# Patient Record
Sex: MALE | Race: WHITE | NOT HISPANIC OR LATINO | URBAN - METROPOLITAN AREA
[De-identification: names, ages, dates, MRNs, and addresses within clinical notes are randomized per-mention and may not be internally consistent; named-entity substitution may affect disease eponyms.]

---

## 2017-09-13 ENCOUNTER — APPOINTMENT (RX ONLY)
Dept: URBAN - METROPOLITAN AREA CLINIC 24 | Facility: CLINIC | Age: 77
Setting detail: DERMATOLOGY
End: 2017-09-13

## 2017-09-13 DIAGNOSIS — L81.4 OTHER MELANIN HYPERPIGMENTATION: ICD-10-CM

## 2017-09-13 DIAGNOSIS — D485 NEOPLASM OF UNCERTAIN BEHAVIOR OF SKIN: ICD-10-CM

## 2017-09-13 DIAGNOSIS — L20.89 OTHER ATOPIC DERMATITIS: ICD-10-CM

## 2017-09-13 DIAGNOSIS — D22 MELANOCYTIC NEVI: ICD-10-CM

## 2017-09-13 DIAGNOSIS — L57.0 ACTINIC KERATOSIS: ICD-10-CM

## 2017-09-13 DIAGNOSIS — D18.0 HEMANGIOMA: ICD-10-CM

## 2017-09-13 PROBLEM — F32.9 MAJOR DEPRESSIVE DISORDER, SINGLE EPISODE, UNSPECIFIED: Status: ACTIVE | Noted: 2017-09-13

## 2017-09-13 PROBLEM — D48.5 NEOPLASM OF UNCERTAIN BEHAVIOR OF SKIN: Status: ACTIVE | Noted: 2017-09-13

## 2017-09-13 PROBLEM — L20.84 INTRINSIC (ALLERGIC) ECZEMA: Status: ACTIVE | Noted: 2017-09-13

## 2017-09-13 PROBLEM — D18.01 HEMANGIOMA OF SKIN AND SUBCUTANEOUS TISSUE: Status: ACTIVE | Noted: 2017-09-13

## 2017-09-13 PROBLEM — D22.5 MELANOCYTIC NEVI OF TRUNK: Status: ACTIVE | Noted: 2017-09-13

## 2017-09-13 PROCEDURE — 17000 DESTRUCT PREMALG LESION: CPT

## 2017-09-13 PROCEDURE — ? OTHER

## 2017-09-13 PROCEDURE — 17003 DESTRUCT PREMALG LES 2-14: CPT

## 2017-09-13 PROCEDURE — 99214 OFFICE O/P EST MOD 30 MIN: CPT | Mod: 25

## 2017-09-13 PROCEDURE — ? LIQUID NITROGEN

## 2017-09-13 PROCEDURE — ? COUNSELING

## 2017-09-13 ASSESSMENT — LOCATION SIMPLE DESCRIPTION DERM
LOCATION SIMPLE: SCALP
LOCATION SIMPLE: LEFT CHEEK
LOCATION SIMPLE: CHEST
LOCATION SIMPLE: ABDOMEN
LOCATION SIMPLE: RIGHT FOREARM
LOCATION SIMPLE: LEFT ANTERIOR NECK
LOCATION SIMPLE: RIGHT ANKLE
LOCATION SIMPLE: LEFT FOREARM
LOCATION SIMPLE: RIGHT UPPER BACK
LOCATION SIMPLE: UPPER BACK

## 2017-09-13 ASSESSMENT — LOCATION DETAILED DESCRIPTION DERM
LOCATION DETAILED: LEFT SUPERIOR MEDIAL MALAR CHEEK
LOCATION DETAILED: LEFT SUPERIOR PARIETAL SCALP
LOCATION DETAILED: RIGHT SUPERIOR MEDIAL UPPER BACK
LOCATION DETAILED: LEFT DISTAL DORSAL FOREARM
LOCATION DETAILED: LEFT CENTRAL MALAR CHEEK
LOCATION DETAILED: RIGHT DISTAL DORSAL FOREARM
LOCATION DETAILED: RIGHT ANTERIOR LATERAL MALLEOLUS
LOCATION DETAILED: LEFT CLAVICULAR NECK
LOCATION DETAILED: LEFT MEDIAL INFERIOR CHEST
LOCATION DETAILED: INFERIOR THORACIC SPINE
LOCATION DETAILED: PERIUMBILICAL SKIN

## 2017-09-13 ASSESSMENT — LOCATION ZONE DERM
LOCATION ZONE: SCALP
LOCATION ZONE: ARM
LOCATION ZONE: TRUNK
LOCATION ZONE: LEG
LOCATION ZONE: NECK
LOCATION ZONE: FACE

## 2017-09-13 NOTE — PROCEDURE: OTHER
Other (Free Text): If it doesn't heal within a month will remove
Note Text (......Xxx Chief Complaint.): This diagnosis correlates with the
Detail Level: Simple
Other (Free Text): Does not bother pt comes and goes

## 2017-09-13 NOTE — HPI: SKIN LESIONS
How Severe Is Your Skin Lesion?: mild
Have Your Skin Lesions Been Treated?: not been treated
Is This A New Presentation, Or A Follow-Up?: Skin Lesions
Additional History: Age spots

## 2017-11-20 ENCOUNTER — HOSPITAL ENCOUNTER (OUTPATIENT)
Dept: SURGERY | Age: 77
Discharge: HOME | End: 2017-11-20
Attending: ORTHOPAEDIC SURGERY

## 2017-11-20 VITALS
TEMPERATURE: 96 F | OXYGEN SATURATION: 97 % | WEIGHT: 175.4 LBS | SYSTOLIC BLOOD PRESSURE: 154 MMHG | HEART RATE: 61 BPM | HEIGHT: 68 IN | BODY MASS INDEX: 26.58 KG/M2 | DIASTOLIC BLOOD PRESSURE: 73 MMHG

## 2017-11-20 RX ORDER — HYDROCODONE BITARTRATE AND ACETAMINOPHEN 5; 325 MG/1; MG/1
.5-1 TABLET ORAL
COMMUNITY
End: 2018-01-12

## 2017-11-20 RX ORDER — DICLOFENAC SODIUM 10 MG/G
GEL TOPICAL
COMMUNITY
End: 2018-01-12

## 2017-11-20 NOTE — PERIOP NOTES
Patient verified name, , and surgery as listed in [facility]. Type 1B surgery, walk in assessment complete. Labs per surgeon: no orders received. Office called. Labs per anesthesia protocol: none needed. EKG: not needed per [facility] protocols. Called and left a voicemail with Asya Morales at Dr. Jen Wei office for pre-op orders and for instructions on how long patient needs to hold Plavix. Stress test dated 17 and [facility] cardiology office note dated 11/3/17 printed and on chart for reference if needed. Requesting other records from [facility] cardiology via fax. Patient has a cardiology appointment on 17. Will need obtain note. Hibiclens and instructions given per hospital policy. Patient provided with and instructed on educational handouts including Guide to Surgery, Pain Management, Hand Hygiene, Blood Transfusion Education, and [facility] Brochure. Patient answered medical/surgical history questions at their best of ability. All prior to admission medications documented in [facility]. Original medication prescription bottles visualized during patient appointment. Patient instructed to hold all vitamins 7 days prior to surgery and NSAIDS 5 days prior to surgery, patient verbalized understanding. Medications to be held: vitamins, voltaren, plavix. Patient instructed to continue previous medications as prescribed prior to surgery and to take the following medications the day of surgery according to anesthesia guidelines with a small sip of water: tylenol if needed, 81 mg aspirin, verapamil, zantac if needed, metoprolol, isosorbide, norco if needed. Instructed to bring c-pap dos. Patient teach back successful and patient demonstrates knowledge of instructions.

## 2017-11-20 NOTE — PERIOP NOTES
Telephone encounter noted in EMR from [facility] cardiology that states patient may hold Plavix for 5 days. Note on chart for reference.

## 2017-11-20 NOTE — PERIOP NOTES
Spoke with Colin Bertrand at Dr. Pugh [facility] office. States patient needs to hold Plavix for 5 days prior. Called and informed patient to hold Plavix for 5 days with last dose being on 11/25/17. Patient verbalized understanding. [place] [facility]ology called and message left for permission to hold Plavix for 5 days.

## 2017-11-30 ENCOUNTER — ANESTHESIA EVENT (OUTPATIENT)
Dept: SURGERY | Age: 77
End: 2017-11-30
Payer: MEDICARE

## 2017-11-30 NOTE — PERIOP NOTES
Pt with cardiology appointment 11/29/17 with [facility]- Dr. Lillian Alvarado. Per office note: \"already had a nuke which was low risk and he is on asa,bblocker,statin without angina. .he should do well\". Office note placed on chart for reference.

## 2017-12-01 ENCOUNTER — HOSPITAL ENCOUNTER (OUTPATIENT)
Age: 77
Setting detail: OUTPATIENT SURGERY
Discharge: HOME | End: 2017-12-01
Attending: ORTHOPAEDIC SURGERY | Admitting: ORTHOPAEDIC SURGERY
Payer: MEDICARE

## 2017-12-01 ENCOUNTER — ANESTHESIA (OUTPATIENT)
Dept: SURGERY | Age: 77
End: 2017-12-01
Payer: MEDICARE

## 2017-12-01 VITALS
HEART RATE: 64 BPM | BODY MASS INDEX: 26.52 KG/M2 | TEMPERATURE: 97.7 F | OXYGEN SATURATION: 92 % | DIASTOLIC BLOOD PRESSURE: 57 MMHG | RESPIRATION RATE: 15 BRPM | SYSTOLIC BLOOD PRESSURE: 113 MMHG | WEIGHT: 175 LBS | HEIGHT: 68 IN

## 2017-12-01 PROCEDURE — 74011250636 HC RX REV CODE- 250/636

## 2017-12-01 PROCEDURE — 74011000250 HC RX REV CODE- 250

## 2017-12-01 PROCEDURE — 77030018836 HC SOL IRR NACL ICUM -A: Performed by: ORTHOPAEDIC SURGERY

## 2017-12-01 PROCEDURE — 77030008703 HC TU ET UNCUF COVD -A: Performed by: ANESTHESIOLOGY

## 2017-12-01 PROCEDURE — 77030016258 HC SLNG ARM PATT -B: Performed by: ORTHOPAEDIC SURGERY

## 2017-12-01 PROCEDURE — 76210000021 HC REC RM PH II 0.5 TO 1 HR: Performed by: ORTHOPAEDIC SURGERY

## 2017-12-01 PROCEDURE — 77030011640 HC PAD GRND REM COVD -A: Performed by: ORTHOPAEDIC SURGERY

## 2017-12-01 PROCEDURE — 77030003602 HC NDL NRV BLK BBMI -B: Performed by: ANESTHESIOLOGY

## 2017-12-01 PROCEDURE — 77030002933 HC SUT MCRYL J&J -A: Performed by: ORTHOPAEDIC SURGERY

## 2017-12-01 PROCEDURE — 77030008477 HC STYL SATN SLP COVD -A: Performed by: ANESTHESIOLOGY

## 2017-12-01 PROCEDURE — 77030020782 HC GWN BAIR PAWS FLX 3M -B: Performed by: ANESTHESIOLOGY

## 2017-12-01 PROCEDURE — 74011250636 HC RX REV CODE- 250/636: Performed by: ANESTHESIOLOGY

## 2017-12-01 PROCEDURE — 77030011263 HC ELECTRD ACRPLSTY CNMD -B: Performed by: ORTHOPAEDIC SURGERY

## 2017-12-01 PROCEDURE — 76942 ECHO GUIDE FOR BIOPSY: CPT | Performed by: ORTHOPAEDIC SURGERY

## 2017-12-01 PROCEDURE — 76010010054 HC POST OP PAIN BLOCK: Performed by: ORTHOPAEDIC SURGERY

## 2017-12-01 PROCEDURE — 76210000006 HC OR PH I REC 0.5 TO 1 HR: Performed by: ORTHOPAEDIC SURGERY

## 2017-12-01 PROCEDURE — 77030003666 HC NDL SPINAL BD -A: Performed by: ORTHOPAEDIC SURGERY

## 2017-12-01 PROCEDURE — 76010000149 HC OR TIME 1 TO 1.5 HR: Performed by: ORTHOPAEDIC SURGERY

## 2017-12-01 PROCEDURE — 77030006891 HC BLD SHV RESECT STRY -B: Performed by: ORTHOPAEDIC SURGERY

## 2017-12-01 PROCEDURE — 76060000033 HC ANESTHESIA 1 TO 1.5 HR: Performed by: ORTHOPAEDIC SURGERY

## 2017-12-01 RX ORDER — ROCURONIUM BROMIDE 10 MG/ML
INJECTION, SOLUTION INTRAVENOUS AS NEEDED
Status: DISCONTINUED | OUTPATIENT
Start: 2017-12-01 | End: 2017-12-01 | Stop reason: HOSPADM

## 2017-12-01 RX ORDER — ROPIVACAINE HYDROCHLORIDE 10 MG/ML
INJECTION EPIDURAL; INFILTRATION; PERINEURAL AS NEEDED
Status: DISCONTINUED | OUTPATIENT
Start: 2017-12-01 | End: 2017-12-01 | Stop reason: HOSPADM

## 2017-12-01 RX ORDER — SODIUM CHLORIDE, SODIUM LACTATE, POTASSIUM CHLORIDE, CALCIUM CHLORIDE 600; 310; 30; 20 MG/100ML; MG/100ML; MG/100ML; MG/100ML
75 INJECTION, SOLUTION INTRAVENOUS CONTINUOUS
Status: DISCONTINUED | OUTPATIENT
Start: 2017-12-01 | End: 2017-12-01 | Stop reason: HOSPADM

## 2017-12-01 RX ORDER — HYDROCODONE BITARTRATE AND ACETAMINOPHEN 5; 325 MG/1; MG/1
2 TABLET ORAL AS NEEDED
Status: DISCONTINUED | OUTPATIENT
Start: 2017-12-01 | End: 2017-12-01 | Stop reason: HOSPADM

## 2017-12-01 RX ORDER — NEOSTIGMINE METHYLSULFATE 1 MG/ML
INJECTION INTRAVENOUS AS NEEDED
Status: DISCONTINUED | OUTPATIENT
Start: 2017-12-01 | End: 2017-12-01 | Stop reason: HOSPADM

## 2017-12-01 RX ORDER — LIDOCAINE HYDROCHLORIDE 10 MG/ML
0.1 INJECTION INFILTRATION; PERINEURAL AS NEEDED
Status: DISCONTINUED | OUTPATIENT
Start: 2017-12-01 | End: 2017-12-01 | Stop reason: HOSPADM

## 2017-12-01 RX ORDER — HYDROMORPHONE HYDROCHLORIDE 2 MG/ML
0.5 INJECTION, SOLUTION INTRAMUSCULAR; INTRAVENOUS; SUBCUTANEOUS
Status: DISCONTINUED | OUTPATIENT
Start: 2017-12-01 | End: 2017-12-01 | Stop reason: HOSPADM

## 2017-12-01 RX ORDER — HYDROCODONE BITARTRATE AND ACETAMINOPHEN 5; 325 MG/1; MG/1
.5-1 TABLET ORAL
Qty: 30 TAB | Refills: 0 | Status: SHIPPED | OUTPATIENT
Start: 2017-12-01 | End: 2018-08-29

## 2017-12-01 RX ORDER — MIDAZOLAM HYDROCHLORIDE 1 MG/ML
5 INJECTION, SOLUTION INTRAMUSCULAR; INTRAVENOUS ONCE
Status: DISCONTINUED | OUTPATIENT
Start: 2017-12-01 | End: 2017-12-01 | Stop reason: HOSPADM

## 2017-12-01 RX ORDER — FENTANYL CITRATE 50 UG/ML
100 INJECTION, SOLUTION INTRAMUSCULAR; INTRAVENOUS ONCE
Status: COMPLETED | OUTPATIENT
Start: 2017-12-01 | End: 2017-12-01

## 2017-12-01 RX ORDER — PROPOFOL 10 MG/ML
INJECTION, EMULSION INTRAVENOUS AS NEEDED
Status: DISCONTINUED | OUTPATIENT
Start: 2017-12-01 | End: 2017-12-01 | Stop reason: HOSPADM

## 2017-12-01 RX ORDER — MIDAZOLAM HYDROCHLORIDE 1 MG/ML
2 INJECTION, SOLUTION INTRAMUSCULAR; INTRAVENOUS
Status: DISCONTINUED | OUTPATIENT
Start: 2017-12-01 | End: 2017-12-01 | Stop reason: HOSPADM

## 2017-12-01 RX ORDER — OXYCODONE HYDROCHLORIDE 5 MG/1
5 TABLET ORAL
Status: DISCONTINUED | OUTPATIENT
Start: 2017-12-01 | End: 2017-12-01 | Stop reason: HOSPADM

## 2017-12-01 RX ORDER — LIDOCAINE HYDROCHLORIDE 20 MG/ML
INJECTION, SOLUTION EPIDURAL; INFILTRATION; INTRACAUDAL; PERINEURAL AS NEEDED
Status: DISCONTINUED | OUTPATIENT
Start: 2017-12-01 | End: 2017-12-01 | Stop reason: HOSPADM

## 2017-12-01 RX ORDER — ONDANSETRON 2 MG/ML
INJECTION INTRAMUSCULAR; INTRAVENOUS AS NEEDED
Status: DISCONTINUED | OUTPATIENT
Start: 2017-12-01 | End: 2017-12-01 | Stop reason: HOSPADM

## 2017-12-01 RX ORDER — GLYCOPYRROLATE 0.2 MG/ML
INJECTION INTRAMUSCULAR; INTRAVENOUS AS NEEDED
Status: DISCONTINUED | OUTPATIENT
Start: 2017-12-01 | End: 2017-12-01 | Stop reason: HOSPADM

## 2017-12-01 RX ORDER — FAMOTIDINE 20 MG/1
20 TABLET, FILM COATED ORAL ONCE
Status: DISCONTINUED | OUTPATIENT
Start: 2017-12-01 | End: 2017-12-01 | Stop reason: HOSPADM

## 2017-12-01 RX ORDER — DEXAMETHASONE SODIUM PHOSPHATE 4 MG/ML
INJECTION, SOLUTION INTRA-ARTICULAR; INTRALESIONAL; INTRAMUSCULAR; INTRAVENOUS; SOFT TISSUE AS NEEDED
Status: DISCONTINUED | OUTPATIENT
Start: 2017-12-01 | End: 2017-12-01 | Stop reason: HOSPADM

## 2017-12-01 RX ADMIN — SODIUM CHLORIDE, SODIUM LACTATE, POTASSIUM CHLORIDE, AND CALCIUM CHLORIDE 75 ML/HR: 600; 310; 30; 20 INJECTION, SOLUTION INTRAVENOUS at 05:50

## 2017-12-01 RX ADMIN — GLYCOPYRROLATE 0.4 MG: 0.2 INJECTION INTRAMUSCULAR; INTRAVENOUS at 08:38

## 2017-12-01 RX ADMIN — ONDANSETRON 4 MG: 2 INJECTION INTRAMUSCULAR; INTRAVENOUS at 08:31

## 2017-12-01 RX ADMIN — NEOSTIGMINE METHYLSULFATE 3 MG: 1 INJECTION INTRAVENOUS at 08:38

## 2017-12-01 RX ADMIN — FENTANYL CITRATE 50 MCG: 50 INJECTION INTRAMUSCULAR; INTRAVENOUS at 07:00

## 2017-12-01 RX ADMIN — ROPIVACAINE HYDROCHLORIDE 15 ML: 10 INJECTION EPIDURAL; INFILTRATION; PERINEURAL at 07:04

## 2017-12-01 RX ADMIN — PROPOFOL 150 MG: 10 INJECTION, EMULSION INTRAVENOUS at 07:42

## 2017-12-01 RX ADMIN — MIDAZOLAM HYDROCHLORIDE 3 MG: 1 INJECTION, SOLUTION INTRAMUSCULAR; INTRAVENOUS at 07:01

## 2017-12-01 RX ADMIN — DEXAMETHASONE SODIUM PHOSPHATE 10 MG: 4 INJECTION, SOLUTION INTRA-ARTICULAR; INTRALESIONAL; INTRAMUSCULAR; INTRAVENOUS; SOFT TISSUE at 08:01

## 2017-12-01 RX ADMIN — ROCURONIUM BROMIDE 40 MG: 10 INJECTION, SOLUTION INTRAVENOUS at 07:42

## 2017-12-01 RX ADMIN — LIDOCAINE HYDROCHLORIDE 100 MG: 20 INJECTION, SOLUTION EPIDURAL; INFILTRATION; INTRACAUDAL; PERINEURAL at 07:42

## 2017-12-01 RX ADMIN — SODIUM CHLORIDE, SODIUM LACTATE, POTASSIUM CHLORIDE, AND CALCIUM CHLORIDE: 600; 310; 30; 20 INJECTION, SOLUTION INTRAVENOUS at 07:53

## 2017-12-01 NOTE — ANESTHESIA PREPROCEDURE EVALUATION
Anesthetic History   No history of anesthetic complications            Review of Systems / Medical History  Patient summary reviewed and pertinent labs reviewed    Pulmonary        Sleep apnea: CPAP           Neuro/Psych   Within defined limits           Cardiovascular    Hypertension: well controlled          CAD and cardiac stents (stent X1 in 2010)    Exercise tolerance: >4 METS     GI/Hepatic/Renal     GERD: well controlled           Endo/Other             Other Findings            Physical Exam    Airway  Mallampati: II  TM Distance: 4 - 6 cm  Neck ROM: normal range of motion   Mouth opening: Normal     Cardiovascular  Regular rate and rhythm,  S1 and S2 normal,  no murmur, click, rub, or gallop             Dental  No notable dental hx       Pulmonary  Breath sounds clear to auscultation               Abdominal  GI exam deferred       Other Findings            Anesthetic Plan    ASA: 3  Anesthesia type: general      Post-op pain plan if not by surgeon: peripheral nerve block single    Induction: Intravenous  Anesthetic plan and risks discussed with: Patient

## 2017-12-01 NOTE — BRIEF OP NOTE
BRIEF OPERATIVE NOTE    Date of Procedure: 12/1/2017   Preoperative Diagnosis: Partial tear of left rotator cuff [M75.112]  Postoperative Diagnosis: Partial tear of left rotator cuff [M75.112]    Procedure(s):  LEFT SHOULDER ARTHROSCOPY ROTATOR CUFF DEBRIDEMENT,BICEPS TENOTOMY, acromioplasty   Surgeon(s) and Role:     * Lani Sen MD - Primary         Assistant Staff:       Surgical Staff:  Circ-1: Ana Stephens RN  Circ-Relief: Natty Trores RN; Guanaco Chou RN  Scrub Tech-1: Mila Cervantes  Scrub Tech-2: Maite Bliss Time In   Incision Start 0755   Incision Close 8272     Anesthesia: General   Estimated Blood Loss: <50cc  Specimens: * No specimens in log *   Findings: biceps tendon and rotator cuff tendon tear   Complications: none  Implants: * No implants in log *

## 2017-12-01 NOTE — ANESTHESIA POSTPROCEDURE EVALUATION
Post-Anesthesia Evaluation and Assessment    Patient: Sung Salazar MRN: [id number]  SSN: [id number]    YOB: 1940  Age: 78 y.o. Sex: male       Cardiovascular Function/Vital Signs  Visit Vitals    /57    Pulse 64    Temp 36.5 °C (97.7 °F)    Resp 15    Ht 5' 8\" (1.727 m)    Wt 79.4 kg (175 lb)    SpO2 92%    BMI 26.61 kg/m2       Patient is status post general anesthesia for Procedure(s):  LEFT SHOULDER ARTHROSCOPY ROTATOR CUFF DEBRIDEMENT,BICEPS TENOTOMY, acromioplasty . Nausea/Vomiting: None    Postoperative hydration reviewed and adequate. Pain:  Pain Scale 1: Numeric (0 - 10) (12/01/17 0940)  Pain Intensity 1: 0 (12/01/17 0940)   Managed    Neurological Status:   Neuro (WDL): Within Defined Limits (12/01/17 0940)  Neuro  Neurologic State: Alert (12/01/17 0940)  Orientation Level: Oriented X4 (12/01/17 0940)  Speech: Clear (12/01/17 0940)  LUE Motor Response: Numbness (12/01/17 0940)  LLE Motor Response: Purposeful (12/01/17 0905)  RUE Motor Response: Purposeful (12/01/17 0905)  RLE Motor Response: Purposeful (12/01/17 0905)   At baseline    Mental Status and Level of Consciousness: Arousable    Pulmonary Status:   O2 Device: Room air (12/01/17 0949)   Adequate oxygenation and airway patent    Complications related to anesthesia: None    Post-anesthesia assessment completed.  No concerns    Signed By: Sheeba Interiano MD     December 1, 2017

## 2017-12-01 NOTE — IP AVS SNAPSHOT
[facility] [id number] 
[phone] Patient: Arminda Wong MRN: [id number] KZA:[id number][id number] About your hospitalization You were admitted on:  December 1, 2017 You last received care in the:  [facility] PACU You were discharged on:  December 1, 2017 Why you were hospitalized Your primary diagnosis was:  Not on File Things You Need To Do (next 8 weeks) Follow up with Pk Garzon MD  
  
Phone:  [phone] Where:  [address], [address] [place] [address], [address] [place] [address] Follow up with Burke Stearns MD  
Dec 14th 8:40 am @ [address] office Phone:  [phone] Where:  [address] [address] Friday Jan 12, 2018 COMPLETE PHYSICAL with Pk Garzon MD at  8:30 AM  
[facility]  [facility] Discharge Orders None A check jose david indicates which time of day the medication should be taken. My Medications TAKE these medications as instructed Instructions Each Dose to Equal  
 Morning Noon Evening Bedtime ALLEGRA-D 12 HOUR  mg Tb12 Generic drug:  fexofenadine-pseudoephedrine Your last dose was: Your next dose is: Take 1 Tab by mouth daily as needed. Indications: Allergic Rhinitis 1 Tab  
    
   
   
   
  
 amitriptyline 10 mg tablet Commonly known as:  ELAVIL Your last dose was: Your next dose is: TAKE 1 TABLET ONCE DAILY   FOR GASTROINTESTINAL UPSET PREVENTION/IRRITABLE BOWEL SYNDROME aspirin delayed-release 81 mg tablet Your last dose was: Your next dose is: Take 81 mg by mouth daily. Take / use AM day of surgery  per anesthesia protocols. 81 mg  
    
   
   
   
  
 clopidogrel 75 mg Tab Commonly known as:  PLAVIX Your last dose was: Your next dose is: Take 1 Tab by mouth daily. 75 mg FISH OIL 1,000 mg Cap Generic drug:  omega-3 fatty acids-vitamin e Your last dose was: Your next dose is: Take 2 Caps by mouth every morning. 2 Cap  
    
   
   
   
  
 isosorbide mononitrate ER 30 mg tablet Commonly known as:  IMDUR Your last dose was: Your next dose is: Take 1 tablet by mouth once daily  Indications: CHRONIC STABLE ANGINA PECTORIS  
     
   
   
   
  
 lisinopril 5 mg tablet Commonly known as:  Marilee Hopewell Your last dose was: Your next dose is: Take 1 Tab by mouth daily. Indications: hypertension 5 mg  
    
   
   
   
  
 metoprolol succinate 50 mg XL tablet Commonly known as:  TOPROL-XL Your last dose was: Your next dose is: Take 1 tablet by mouth once daily  Indications: hypertension  
     
   
   
   
  
 nitroglycerin 0.4 mg SL tablet Commonly known as:  NITROSTAT Your last dose was: Your next dose is:    
   
   
 1 Tab by SubLINGual route every five (5) minutes as needed for Chest Pain (Last reported use was 4/12/15 approx). 0.4 mg  
    
   
   
   
  
 NORCO 5-325 mg per tablet Generic drug:  HYDROcodone-acetaminophen Your last dose was: Your next dose is: Take 0.5-1 Tabs by mouth every twelve (12) hours as needed for Pain. Take / use AM day of surgery  per anesthesia protocols if needed. Indications: Pain  
 0.5-1 Tab  
    
   
   
   
  
 pramipexole 0.5 mg tablet Commonly known as:  MIRAPEX Your last dose was: Your next dose is: Take 1 Tab by mouth nightly. Indications: Restless Legs Syndrome 0.5 mg  
    
   
   
   
  
 PRESERVISION AREDS 2 PO Your last dose was: Your next dose is: Take 2 Caps by mouth daily. 2 Cap simvastatin 20 mg tablet Commonly known as:  ZOCOR Your last dose was: Your next dose is: Take 1 Tab by mouth nightly. Indications: mixed hyperlipidemia 20 mg  
    
   
   
   
  
 TYLENOL ARTHRITIS PAIN 650 mg Tber Generic drug:  acetaminophen Your last dose was: Your next dose is: Take 650 mg by mouth every six (6) hours as needed for Pain. Take / use AM day of surgery  per anesthesia protocols if needed 650 mg  
    
   
   
   
  
 verapamil  mg CR tablet Commonly known as:  CALAN-SR Your last dose was: Your next dose is: Take 1 Tab by mouth daily. Indications: hypertension 240 mg  
    
   
   
   
  
 VITAMIN D3 1,000 unit Cap Generic drug:  cholecalciferol Your last dose was: Your next dose is: Take 1,000 Units by mouth daily. 1000 Units VOLTAREN 1 % Gel Generic drug:  diclofenac Your last dose was: Your next dose is:    
   
   
 Apply  to affected area two (2) times daily as needed. Indications: OSTEOARTHRITIS  
     
   
   
   
  
 ZANTAC 150 mg tablet Generic drug:  raNITIdine Your last dose was: Your next dose is: Take 150 mg by mouth daily as needed for Indigestion. Indications: GASTROESOPHAGEAL REFLUX  
 150 mg Discharge Instructions Arthroscopic Acromioplasty Discharge Instructions ACTIVITY:  
- You may use your operated arm as much as you pain allows you to use it - Use arm sling until you are comfortable without it 
- It's OK to bathe or shower as you normally would. Your dressing is waterproof. DIET: 
- Clear liquids until no nausea or vomiting; then light diet for the first day - Advance to regular diet as you feel like it 
- If nausea and vomiting occurs,use the phenergan prescription you were given by Dr Loretta Granados. If the phenergan doesn't work please call our doctor on call. (Call [phone] if it  is after regular office hours) PAIN: 
- Take pain medication(s) as directed by the prescription you were given - Remember that it often helps to take acetaminophen with your pain medicine IF IT DOES NOT CONTAIN ACETAMINOPHEN. You may take up to 8 extra-strength tylenol or up to 12 regular tylenol per 24 hours. - You may also use ibuprofen 800 mg every six hours or aleve 440 mg every 8 hours IN ADDITION TO your prescribed pain medicine - Call Dr Loretta Granados if pain is NOT adequately relieved by medication DRESSING CARE: 
- There is no need to change your dressing before your follow up visit however you may remove it as early as Tuesday if you'd like it off CALL YOUR DOCTOR IF: 
- You notice excessive bleeding that does not stop after holding mild pressure over the area - Temperature of 100.5°F or greater - Redness, excessive swelling or bruising, and/or green or yellow, smelly discharge from incision AFTER ANESTHESIA: 
- For the next 12 hours: DO NOT drive, drink alcoholic beverages, or make important decisions - Be aware of dizziness following anesthesia and while taking pain medication(s) MEDICATIONS: 
Continue your usual home medications as previously prescribed unless you were told otherwise Signed: Geno Bernard MD 
12/1/2017 
8:51 AM 
 
 
 
 
 
ACO Transitions of Care Introducing [employer] [address] offers a voluntary care coordination program to provide high quality service and care to Psychiatric fee-for-service beneficiaries. Silver Chanel was designed to help you enhance your health and well-being through the following services: ? Transitions of Care  support for individuals who are transitioning from one care setting to another (example: Hospital to home). ? Chronic and Complex Care Coordination  support for individuals and caregivers of those with serious or chronic illnesses or with more than one chronic (ongoing) condition and those who take a number of different medications. If you meet specific medical criteria, a On license of [facility] [address] may call you directly to coordinate your care with your primary care physician and your other care providers. For questions about the [facility] programs, please, contact your physicians office. For general questions or additional information about Accountable Care Organizations: 
Please visit www.medicare.gov/acos. html or call 1-800-MEDICARE (1-724.983.6679) TTY users should call 0-523.457.5321. Introducing [facility]! Shantel Galvin introduces [employer]et patient portal. Now you can access parts of your medical record, email your doctor's office, and request medication refills online. 1. In your internet browser, go to [email or web address] 2. Click on the First Time User? Click Here link in the Sign In box. You will see the New Member Sign Up page. 3. Enter your [employer] Access Code exactly as it appears below. You will not need to use this code after youve completed the sign-up process. If you do not sign up before the expiration date, you must request a new code. · AddFleet Access Code: [facility] Expires: 2/18/2018  6:41 AM 
 
4. Enter the last four digits of your Social Security Number (xxxx) and Date of Birth (mm/dd/yyyy) as indicated and click Submit. You will be taken to the next sign-up page. 5. Create a [employer] ID. This will be your AddF[employer]et login ID and cannot be changed, so think of one that is secure and easy to remember. 6. Create a AddF[employer]et password. You can change your password at any time. 7. Enter your Password Reset Question and Answer. This can be used at a later time if you forget your password. 8. Enter your e-mail address. You will receive e-mail notification when new information is available in [address] 9. Click Sign Up. You can now view and download portions of your medical record. 10. Click the Download Summary menu link to download a portable copy of your medical information. If you have questions, please visit the Frequently Asked Questions section of the [employer] website. Remember, [employer] is NOT to be used for urgent needs. For medical emergencies, dial 911. Now available from your iPhone and Android! Providers Seen During Your Hospitalization Provider Specialty Primary office phone Geno Bernard MD Orthopedic Surgery [phone] Your Primary Care Physician (PCP) Primary Care Physician Office Phone Office Fax [address] [phone] You are allergic to the following Allergen Reactions Augmentin (Amoxicillin-Pot Clavulanate) Nausea and Vomiting Severe abd cramping Severe abd cramping Oxycodone Unknown (comments) Sever headaches, abdominal issues Recent Documentation Height Weight BMI Smoking Status 1.727 m 79.4 kg 26.61 kg/m2 Former Smoker Emergency Contacts Name Discharge Info Relation Home Work Mobile Cee Tellez  Spouse [3] [phone] Patient Belongings The following personal items are in your possession at time of discharge: 
  Dental Appliances: None             Jewelry: None Please provide this summary of care documentation to your next provider. Signatures-by signing, you are acknowledging that this After Visit Summary has been reviewed with you and you have received a copy. Patient Signature:  ____________________________________________________________ Date:  ____________________________________________________________  
  
JeniNaches Gene  Provider Signature: ____________________________________________________________ Date:  ____________________________________________________________

## 2017-12-01 NOTE — DISCHARGE INSTRUCTIONS
Arthroscopic Acromioplasty Discharge Instructions    ACTIVITY:   - You may use your operated arm as much as you pain allows you to use it  - Use arm sling until you are comfortable without it  - It's OK to bathe or shower as you normally would. Your dressing is waterproof. DIET:  - Clear liquids until no nausea or vomiting; then light diet for the first day  - Advance to regular diet as you feel like it  - If nausea and vomiting occurs,use the phenergan prescription you were given by Dr Madyson Carney. If the phenergan doesn't work please call our doctor on call. (Call [phone] if it  is after regular office hours)    PAIN:  - Take pain medication(s) as directed by the prescription you were given  - Remember that it often helps to take acetaminophen with your pain medicine IF IT DOES NOT CONTAIN ACETAMINOPHEN. You may take up to 8 extra-strength tylenol or up to 12 regular tylenol per 24 hours.    - You may also use ibuprofen 800 mg every six hours or aleve 440 mg every 8 hours IN ADDITION TO your prescribed pain medicine  - Call Dr Madyson Carney if pain is NOT adequately relieved by medication    DRESSING CARE:  - There is no need to change your dressing before your follow up visit however you may remove it as early as Tuesday if you'd like it off    [facility] 18 IF:  - You notice excessive bleeding that does not stop after holding mild pressure over the area  - Temperature of 100.5°F or greater  - Redness, excessive swelling or bruising, and/or green or yellow, smelly discharge from incision    AFTER ANESTHESIA:  - For the next 12 hours: DO NOT drive, drink alcoholic beverages, or make important decisions  - Be aware of dizziness following anesthesia and while taking pain medication(s)    MEDICATIONS:  Continue your usual home medications as previously prescribed unless you were told otherwise    Signed:  Osmin Winslow MD  12/1/2017  8:51 AM

## 2017-12-01 NOTE — ANESTHESIA PROCEDURE NOTES
Peripheral Block    Start time: 12/1/2017 7:01 AM  End time: 12/1/2017 7:05 AM  Performed by: Ruben White  Authorized by: Eddy MARTINO       Pre-procedure: Indications: at surgeon's request, post-op pain management and procedure for pain    Preanesthetic Checklist: patient identified, risks and benefits discussed, site marked, timeout performed, anesthesia consent given and patient being monitored    Timeout Time: 07:00          Block Type:   Block Type:   Interscalene  Laterality:  Left  Monitoring:  Standard ASA monitoring, responsive to questions, oxygen, continuous pulse ox, heart rate and frequent vital sign checks  Injection Technique:  Single shot  Procedures: ultrasound guided and nerve stimulator    Patient Position: supine  Prep: chlorhexidine    Location:  Interscalene  Needle Type:  Stimuplex  Needle Gauge:  22 G  Needle Localization:  Nerve stimulator and ultrasound guidance  Motor Response: minimal motor response >0.4 mA    Medication Injected:  1.0%  ropivacaine  Adds:  Epi 1:200K  Volume (mL):  15  Add'l Medication Injected:  2.0%  mepivacaine  Adds:  Epi 1:200K  Volume (mL):  15    Assessment:  Number of attempts:  1  Injection Assessment:  Incremental injection every 5 mL, no paresthesia, ultrasound image on chart, local visualized surrounding nerve on ultrasound, negative aspiration for blood and no intravascular symptoms  Patient tolerance:  Patient tolerated the procedure well with no immediate complications

## 2017-12-02 NOTE — OP NOTES
[facility]   OPERATIVE REPORT       Name:  Angelica Richard   MR#:  [id number]   :  1940   Account #:  [de-identified]   Date of Adm:  2017       DATE OF SURGERY: 2017    PREOPERATIVE DIAGNOSIS: Rotator cuff tear of the left shoulder   with biceps tendon tear. POSTOPERATIVE DIAGNOSIS: Rotator cuff tear of the left shoulder   with biceps tendon tear. NAME OF PROCEDURES PERFORMED   1. Arthroscopic biceps tenotomy. 2. Arthroscopic rotator cuff debridement. 3. Arthroscopic acromioplasty. SURGEON: Marlo Anaya MD    ANESTHESIA:      FINDINGS: The articular cartilage of his humeral head and   glenoid looked normal for age. His subscapularis was healthy. His biceps tendon was profoundly degenerative and flattened. There was a full thickness rotator cuff tear that was a   delaminating tear. About 10% of fibers remained intact, except   for a full-thickness component that was about 8 mm. At the   completion of the acromioplasty, a flat undersurface of acromion   had been created. PROCEDURES IN DETAIL: In the operating room, he was   anesthetized. In the beachchair position, his left shoulder was   prepped and draped in a sterile fashion. I distended the   shoulder with irrigant from a posterior puncture and placed an   18-gauge needle through the rotator cuff interval. The   arthroscope was introduced posteriorly and initial survey taken   from that portal with the highlights photographed. Electrocautery was inserted through the rotator cuff interval   and used to transect the biceps tendon at the labral insertion. The tendon retracted out of the joint. The rotator cuff tear was   observed from this side. The subacromial space was entered through a lateral acromial   incision with a shaver.  The arthroscope was inserted from the   posterior portal, and under direct vision, the debridement of   rotator cuff tendon was carried out, removing all of the fibrillated portions of the tear edge. Next, an acromioplasty was carried out with an aggressive   cutter. The highlights of this too were photographed. All   apparatus was removed, and the wounds were closed with   interrupted Monocryl in the subcu, Mastisol and Steri-Strips on   the skin, and a sterile waterproof dressing followed by a sling. There were no specimens sent, and his blood loss was estimated   at less than 50 mL.         MD Rachael Deshpande / [place]   D:  12/01/2017   08:47   T:  12/01/2017   22:47   Job #:  [id number]

## 2018-01-23 ENCOUNTER — HOSPITAL ENCOUNTER (OUTPATIENT)
Dept: SLEEP MEDICINE | Age: 78
Discharge: HOME | End: 2018-01-23
Payer: MEDICARE

## 2018-01-23 PROCEDURE — 95811 POLYSOM 6/>YRS CPAP 4/> PARM: CPT

## 2018-05-18 ENCOUNTER — HOSPITAL ENCOUNTER (EMERGENCY)
Age: 78
Discharge: HOME | DRG: 378 | End: 2018-05-18
Attending: EMERGENCY MEDICINE
Payer: MEDICARE

## 2018-05-18 VITALS
TEMPERATURE: 98.1 F | HEIGHT: 68 IN | HEART RATE: 70 BPM | SYSTOLIC BLOOD PRESSURE: 110 MMHG | OXYGEN SATURATION: 97 % | RESPIRATION RATE: 18 BRPM | DIASTOLIC BLOOD PRESSURE: 54 MMHG | BODY MASS INDEX: 25.76 KG/M2 | WEIGHT: 170 LBS

## 2018-05-18 DIAGNOSIS — K92.2 GASTROINTESTINAL HEMORRHAGE, UNSPECIFIED GASTROINTESTINAL HEMORRHAGE TYPE: Primary | ICD-10-CM

## 2018-05-18 LAB
ALBUMIN SERPL-MCNC: 3.3 G/DL (ref 3.2–4.6)
ALBUMIN/GLOB SERPL: 1 {RATIO} (ref 1.2–3.5)
ALP SERPL-CCNC: 72 U/L (ref 50–136)
ALT SERPL-CCNC: 27 U/L (ref 12–65)
ANION GAP SERPL CALC-SCNC: 7 MMOL/L (ref 7–16)
AST SERPL-CCNC: 33 U/L (ref 15–37)
BASOPHILS # BLD: 0 K/UL (ref 0–0.2)
BASOPHILS NFR BLD: 1 % (ref 0–2)
BILIRUB SERPL-MCNC: 0.6 MG/DL (ref 0.2–1.1)
BUN SERPL-MCNC: 36 MG/DL (ref 8–23)
CALCIUM SERPL-MCNC: 8.6 MG/DL (ref 8.3–10.4)
CHLORIDE SERPL-SCNC: 106 MMOL/L (ref 98–107)
CO2 SERPL-SCNC: 27 MMOL/L (ref 21–32)
CREAT SERPL-MCNC: 0.72 MG/DL (ref 0.8–1.5)
DIFFERENTIAL METHOD BLD: ABNORMAL
EOSINOPHIL # BLD: 0.1 K/UL (ref 0–0.8)
EOSINOPHIL NFR BLD: 1 % (ref 0.5–7.8)
ERYTHROCYTE [DISTWIDTH] IN BLOOD BY AUTOMATED COUNT: 13.3 % (ref 11.9–14.6)
GLOBULIN SER CALC-MCNC: 3.3 G/DL (ref 2.3–3.5)
GLUCOSE SERPL-MCNC: 95 MG/DL (ref 65–100)
HCT VFR BLD AUTO: 32.7 % (ref 41.1–50.3)
HGB BLD-MCNC: 11 G/DL (ref 13.6–17.2)
IMM GRANULOCYTES # BLD: 0 K/UL (ref 0–0.5)
IMM GRANULOCYTES NFR BLD AUTO: 0 % (ref 0–5)
INR PPP: 1.1
LYMPHOCYTES # BLD: 2.6 K/UL (ref 0.5–4.6)
LYMPHOCYTES NFR BLD: 33 % (ref 13–44)
MCH RBC QN AUTO: 29.6 PG (ref 26.1–32.9)
MCHC RBC AUTO-ENTMCNC: 33.6 G/DL (ref 31.4–35)
MCV RBC AUTO: 88.1 FL (ref 79.6–97.8)
MONOCYTES # BLD: 0.5 K/UL (ref 0.1–1.3)
MONOCYTES NFR BLD: 6 % (ref 4–12)
NEUTS SEG # BLD: 4.7 K/UL (ref 1.7–8.2)
NEUTS SEG NFR BLD: 59 % (ref 43–78)
PLATELET # BLD AUTO: 273 K/UL (ref 150–450)
PMV BLD AUTO: 9.9 FL (ref 10.8–14.1)
POTASSIUM SERPL-SCNC: 3.9 MMOL/L (ref 3.5–5.1)
PROT SERPL-MCNC: 6.6 G/DL (ref 6.3–8.2)
PROTHROMBIN TIME: 13.5 SEC (ref 11.5–14.5)
RBC # BLD AUTO: 3.71 M/UL (ref 4.23–5.67)
SODIUM SERPL-SCNC: 140 MMOL/L (ref 136–145)
WBC # BLD AUTO: 7.9 K/UL (ref 4.3–11.1)

## 2018-05-18 PROCEDURE — 99284 EMERGENCY DEPT VISIT MOD MDM: CPT | Performed by: EMERGENCY MEDICINE

## 2018-05-18 PROCEDURE — 85610 PROTHROMBIN TIME: CPT | Performed by: EMERGENCY MEDICINE

## 2018-05-18 PROCEDURE — 80053 COMPREHEN METABOLIC PANEL: CPT | Performed by: EMERGENCY MEDICINE

## 2018-05-18 PROCEDURE — 85025 COMPLETE CBC W/AUTO DIFF WBC: CPT | Performed by: EMERGENCY MEDICINE

## 2018-05-18 NOTE — ED TRIAGE NOTES
\"I think I have a GI bleed. I have had one before. I am having dark stool and abdominal pain. It comes in waves in my abdomin and back.   I have IBS also\"

## 2018-05-19 ENCOUNTER — APPOINTMENT (OUTPATIENT)
Dept: GENERAL RADIOLOGY | Age: 78
DRG: 378 | End: 2018-05-19
Attending: EMERGENCY MEDICINE
Payer: MEDICARE

## 2018-05-19 ENCOUNTER — HOSPITAL ENCOUNTER (INPATIENT)
Age: 78
LOS: 3 days | Discharge: HOME | DRG: 378 | End: 2018-05-22
Attending: EMERGENCY MEDICINE | Admitting: INTERNAL MEDICINE
Payer: MEDICARE

## 2018-05-19 DIAGNOSIS — K92.2 UPPER GI BLEED: Primary | ICD-10-CM

## 2018-05-19 DIAGNOSIS — D64.9 ANEMIA, UNSPECIFIED TYPE: ICD-10-CM

## 2018-05-19 PROBLEM — D62 ACUTE BLOOD LOSS ANEMIA: Status: ACTIVE | Noted: 2018-05-19

## 2018-05-19 LAB
ALBUMIN SERPL-MCNC: 3.1 G/DL (ref 3.2–4.6)
ALBUMIN/GLOB SERPL: 1 {RATIO} (ref 1.2–3.5)
ALP SERPL-CCNC: 68 U/L (ref 50–136)
ALT SERPL-CCNC: 22 U/L (ref 12–65)
ANION GAP SERPL CALC-SCNC: 6 MMOL/L (ref 7–16)
AST SERPL-CCNC: 21 U/L (ref 15–37)
BASOPHILS # BLD: 0 K/UL (ref 0–0.2)
BASOPHILS NFR BLD: 0 % (ref 0–2)
BILIRUB SERPL-MCNC: 0.5 MG/DL (ref 0.2–1.1)
BUN SERPL-MCNC: 33 MG/DL (ref 8–23)
CALCIUM SERPL-MCNC: 8.4 MG/DL (ref 8.3–10.4)
CHLORIDE SERPL-SCNC: 106 MMOL/L (ref 98–107)
CO2 SERPL-SCNC: 29 MMOL/L (ref 21–32)
CREAT SERPL-MCNC: 0.76 MG/DL (ref 0.8–1.5)
DIFFERENTIAL METHOD BLD: ABNORMAL
EOSINOPHIL # BLD: 0.1 K/UL (ref 0–0.8)
EOSINOPHIL NFR BLD: 1 % (ref 0.5–7.8)
ERYTHROCYTE [DISTWIDTH] IN BLOOD BY AUTOMATED COUNT: 13.6 % (ref 11.9–14.6)
GLOBULIN SER CALC-MCNC: 3 G/DL (ref 2.3–3.5)
GLUCOSE SERPL-MCNC: 106 MG/DL (ref 65–100)
HCT VFR BLD AUTO: 27.5 % (ref 41.1–50.3)
HGB BLD-MCNC: 9.2 G/DL (ref 13.6–17.2)
IMM GRANULOCYTES # BLD: 0 K/UL (ref 0–0.5)
IMM GRANULOCYTES NFR BLD AUTO: 0 % (ref 0–5)
INR PPP: 1.2
LYMPHOCYTES # BLD: 2.5 K/UL (ref 0.5–4.6)
LYMPHOCYTES NFR BLD: 25 % (ref 13–44)
MCH RBC QN AUTO: 29.7 PG (ref 26.1–32.9)
MCHC RBC AUTO-ENTMCNC: 33.5 G/DL (ref 31.4–35)
MCV RBC AUTO: 88.7 FL (ref 79.6–97.8)
MONOCYTES # BLD: 0.6 K/UL (ref 0.1–1.3)
MONOCYTES NFR BLD: 6 % (ref 4–12)
NEUTS SEG # BLD: 6.9 K/UL (ref 1.7–8.2)
NEUTS SEG NFR BLD: 68 % (ref 43–78)
PLATELET # BLD AUTO: 267 K/UL (ref 150–450)
PMV BLD AUTO: 9.9 FL (ref 10.8–14.1)
POTASSIUM SERPL-SCNC: 4.2 MMOL/L (ref 3.5–5.1)
PROT SERPL-MCNC: 6.1 G/DL (ref 6.3–8.2)
PROTHROMBIN TIME: 14.4 SEC (ref 11.5–14.5)
RBC # BLD AUTO: 3.1 M/UL (ref 4.23–5.67)
SODIUM SERPL-SCNC: 141 MMOL/L (ref 136–145)
TROPONIN I SERPL-MCNC: 0.03 NG/ML (ref 0.02–0.05)
WBC # BLD AUTO: 10.1 K/UL (ref 4.3–11.1)

## 2018-05-19 PROCEDURE — 74011250637 HC RX REV CODE- 250/637: Performed by: INTERNAL MEDICINE

## 2018-05-19 PROCEDURE — 80053 COMPREHEN METABOLIC PANEL: CPT | Performed by: EMERGENCY MEDICINE

## 2018-05-19 PROCEDURE — 65270000029 HC RM PRIVATE

## 2018-05-19 PROCEDURE — 74011000250 HC RX REV CODE- 250: Performed by: INTERNAL MEDICINE

## 2018-05-19 PROCEDURE — 74011000250 HC RX REV CODE- 250: Performed by: EMERGENCY MEDICINE

## 2018-05-19 PROCEDURE — 74011250636 HC RX REV CODE- 250/636: Performed by: INTERNAL MEDICINE

## 2018-05-19 PROCEDURE — 99284 EMERGENCY DEPT VISIT MOD MDM: CPT | Performed by: EMERGENCY MEDICINE

## 2018-05-19 PROCEDURE — 74011250636 HC RX REV CODE- 250/636: Performed by: EMERGENCY MEDICINE

## 2018-05-19 PROCEDURE — 81003 URINALYSIS AUTO W/O SCOPE: CPT | Performed by: EMERGENCY MEDICINE

## 2018-05-19 PROCEDURE — C9113 INJ PANTOPRAZOLE SODIUM, VIA: HCPCS | Performed by: EMERGENCY MEDICINE

## 2018-05-19 PROCEDURE — 84484 ASSAY OF TROPONIN QUANT: CPT | Performed by: EMERGENCY MEDICINE

## 2018-05-19 PROCEDURE — 93005 ELECTROCARDIOGRAM TRACING: CPT | Performed by: EMERGENCY MEDICINE

## 2018-05-19 PROCEDURE — 85025 COMPLETE CBC W/AUTO DIFF WBC: CPT | Performed by: EMERGENCY MEDICINE

## 2018-05-19 PROCEDURE — 71046 X-RAY EXAM CHEST 2 VIEWS: CPT

## 2018-05-19 PROCEDURE — 85610 PROTHROMBIN TIME: CPT | Performed by: EMERGENCY MEDICINE

## 2018-05-19 PROCEDURE — 86923 COMPATIBILITY TEST ELECTRIC: CPT | Performed by: EMERGENCY MEDICINE

## 2018-05-19 PROCEDURE — C9113 INJ PANTOPRAZOLE SODIUM, VIA: HCPCS | Performed by: INTERNAL MEDICINE

## 2018-05-19 PROCEDURE — 86850 RBC ANTIBODY SCREEN: CPT | Performed by: EMERGENCY MEDICINE

## 2018-05-19 PROCEDURE — 96374 THER/PROPH/DIAG INJ IV PUSH: CPT | Performed by: EMERGENCY MEDICINE

## 2018-05-19 RX ORDER — VERAPAMIL HYDROCHLORIDE 240 MG/1
240 TABLET, FILM COATED, EXTENDED RELEASE ORAL DAILY
Status: DISCONTINUED | OUTPATIENT
Start: 2018-05-20 | End: 2018-05-22 | Stop reason: HOSPADM

## 2018-05-19 RX ORDER — SIMVASTATIN 10 MG/1
10 TABLET, FILM COATED ORAL
Status: DISCONTINUED | OUTPATIENT
Start: 2018-05-19 | End: 2018-05-22 | Stop reason: HOSPADM

## 2018-05-19 RX ORDER — METOPROLOL SUCCINATE 50 MG/1
50 TABLET, EXTENDED RELEASE ORAL DAILY
Status: DISCONTINUED | OUTPATIENT
Start: 2018-05-20 | End: 2018-05-22 | Stop reason: HOSPADM

## 2018-05-19 RX ORDER — ISOSORBIDE MONONITRATE 30 MG/1
30 TABLET, EXTENDED RELEASE ORAL DAILY
Status: DISCONTINUED | OUTPATIENT
Start: 2018-05-20 | End: 2018-05-22 | Stop reason: HOSPADM

## 2018-05-19 RX ORDER — SODIUM CHLORIDE 9 MG/ML
100 INJECTION, SOLUTION INTRAVENOUS CONTINUOUS
Status: DISCONTINUED | OUTPATIENT
Start: 2018-05-19 | End: 2018-05-22

## 2018-05-19 RX ORDER — HYDROCODONE BITARTRATE AND ACETAMINOPHEN 5; 325 MG/1; MG/1
0.5 TABLET ORAL
Status: DISCONTINUED | OUTPATIENT
Start: 2018-05-19 | End: 2018-05-22 | Stop reason: HOSPADM

## 2018-05-19 RX ORDER — PRAMIPEXOLE DIHYDROCHLORIDE 0.25 MG/1
0.5 TABLET ORAL
Status: DISCONTINUED | OUTPATIENT
Start: 2018-05-19 | End: 2018-05-22 | Stop reason: HOSPADM

## 2018-05-19 RX ORDER — SODIUM CHLORIDE 0.9 % (FLUSH) 0.9 %
5-10 SYRINGE (ML) INJECTION EVERY 8 HOURS
Status: DISCONTINUED | OUTPATIENT
Start: 2018-05-19 | End: 2018-05-22 | Stop reason: HOSPADM

## 2018-05-19 RX ORDER — HYDROCODONE BITARTRATE AND ACETAMINOPHEN 5; 325 MG/1; MG/1
1 TABLET ORAL
Status: DISCONTINUED | OUTPATIENT
Start: 2018-05-19 | End: 2018-05-22 | Stop reason: HOSPADM

## 2018-05-19 RX ORDER — LISINOPRIL 5 MG/1
5 TABLET ORAL DAILY
Status: DISCONTINUED | OUTPATIENT
Start: 2018-05-20 | End: 2018-05-22 | Stop reason: HOSPADM

## 2018-05-19 RX ORDER — SODIUM CHLORIDE 9 MG/ML
1000 INJECTION, SOLUTION INTRAVENOUS ONCE
Status: COMPLETED | OUTPATIENT
Start: 2018-05-19 | End: 2018-05-19

## 2018-05-19 RX ORDER — AMITRIPTYLINE HYDROCHLORIDE 10 MG/1
10 TABLET, FILM COATED ORAL
Status: DISCONTINUED | OUTPATIENT
Start: 2018-05-19 | End: 2018-05-22 | Stop reason: HOSPADM

## 2018-05-19 RX ORDER — HYDROCODONE BITARTRATE AND ACETAMINOPHEN 5; 325 MG/1; MG/1
.5-1 TABLET ORAL
Status: DISCONTINUED | OUTPATIENT
Start: 2018-05-19 | End: 2018-05-19 | Stop reason: SDUPTHER

## 2018-05-19 RX ORDER — ACETAMINOPHEN 325 MG/1
650 TABLET ORAL
Status: DISCONTINUED | OUTPATIENT
Start: 2018-05-19 | End: 2018-05-22 | Stop reason: HOSPADM

## 2018-05-19 RX ORDER — ONDANSETRON 2 MG/ML
4 INJECTION INTRAMUSCULAR; INTRAVENOUS
Status: DISCONTINUED | OUTPATIENT
Start: 2018-05-19 | End: 2018-05-22 | Stop reason: HOSPADM

## 2018-05-19 RX ORDER — SODIUM CHLORIDE 0.9 % (FLUSH) 0.9 %
5-10 SYRINGE (ML) INJECTION AS NEEDED
Status: DISCONTINUED | OUTPATIENT
Start: 2018-05-19 | End: 2018-05-22 | Stop reason: HOSPADM

## 2018-05-19 RX ADMIN — SIMVASTATIN 10 MG: 10 TABLET, FILM COATED ORAL at 23:15

## 2018-05-19 RX ADMIN — PRAMIPEXOLE DIHYDROCHLORIDE 0.5 MG: 0.25 TABLET ORAL at 23:15

## 2018-05-19 RX ADMIN — SODIUM CHLORIDE 10 ML: 9 INJECTION, SOLUTION INTRAMUSCULAR; INTRAVENOUS; SUBCUTANEOUS at 23:15

## 2018-05-19 RX ADMIN — SODIUM CHLORIDE 80 MG: 9 INJECTION INTRAMUSCULAR; INTRAVENOUS; SUBCUTANEOUS at 20:20

## 2018-05-19 RX ADMIN — SODIUM CHLORIDE 100 ML/HR: 900 INJECTION, SOLUTION INTRAVENOUS at 23:16

## 2018-05-19 RX ADMIN — HYDROCODONE BITARTRATE AND ACETAMINOPHEN 1 TABLET: 5; 325 TABLET ORAL at 23:52

## 2018-05-19 RX ADMIN — AMITRIPTYLINE HYDROCHLORIDE 10 MG: 10 TABLET, FILM COATED ORAL at 23:15

## 2018-05-19 RX ADMIN — SODIUM CHLORIDE 40 MG: 9 INJECTION INTRAMUSCULAR; INTRAVENOUS; SUBCUTANEOUS at 23:15

## 2018-05-19 RX ADMIN — SODIUM CHLORIDE 1000 ML: 900 INJECTION, SOLUTION INTRAVENOUS at 20:23

## 2018-05-19 NOTE — ED PROVIDER NOTES
HPI Comments: Patient states he has a history of IBS. For the past week he has been having slightly more bowel pain than usual.  Yesterday he started noticing his stools were dark. He has been having more bowel movements than is typical for him. He denies any diarrhea, denies any nausea or vomiting. He states a similar thing has happened in the past with a GI bleed. He was on Plavix at that time and came off of the Plavix for a wild. He currently is taking Plavix. Elements of this note were created using speech recognition software. As such, errors of speech recognition may be present. Patient is a 78 y.o. male presenting with abdominal pain. The history is provided by the patient. Abdominal Pain    Pertinent negatives include no fever, no nausea and no vomiting. Past Medical History:   Diagnosis Date    Allergic rhinitis     Aortic insufficiency     Aortic sclerosis     no stenosis- ECHO 9/8/14  LVEF 50-55%    BPH with urinary obstruction     resolved with surgery    CAD (coronary artery disease)     stent X1 in 2010. .managed with medications    Chronic pain     right shoulder    Depression     Dyslipidemia     managed with medications    Former pipe smoker     stopped at age 30    GERD (gastroesophageal reflux disease)     managed with PRN medications    GI bleed     from Plavix    History of depression     Hypercholesterolemia     Hypertension     controlled w/med    IBS (irritable bowel syndrome)     manged with medications    Macular degeneration     managed with medications    Old MI (myocardial infarction)     x2; last 9/2014    Osteoarthritis     in shoulders and back    PUD (peptic ulcer disease) 3/2011    no recent episodes    Restless leg syndrome     manged with medications    Seasonal allergic rhinitis     PRN medications    Unspecified sleep apnea     wears cpap       Past Surgical History:   Procedure Laterality Date    HX CATARACT REMOVAL Bilateral     HX COLONOSCOPY      HX HEART CATHETERIZATION  , 3/2011,2011    (1)stent      HX HEART CATHETERIZATION  2014    no stent required    HX SHOULDER ARTHROSCOPY Right 2010 approx    HX SHOULDER REPLACEMENT Right 2015    HX TONSIL AND ADENOIDECTOMY      as a child    HX TURP  05/15; 07/15         Family History:   Problem Relation Age of Onset    Heart Disease Mother     High Cholesterol Mother     Hypertension Mother     Coronary Artery Disease Mother     Cancer Father      lung cancer--    Heart Disease Brother     Hypertension Brother        Social History     Social History    Marital status:      Spouse name: N/A    Number of children: N/A    Years of education: N/A     Occupational History    Not on file. Social History Main Topics    Smoking status: Former Smoker     Packs/day: 0.00     Years: 0.00    Smokeless tobacco: Never Used      Comment: quit smoking pipe --- no cigs    Alcohol use No    Drug use: No    Sexual activity: Not on file     Other Topics Concern    Not on file     Social History Narrative         ALLERGIES: Augmentin [amoxicillin-pot clavulanate] and Oxycodone    Review of Systems   Constitutional: Negative for chills and fever. Gastrointestinal: Positive for abdominal pain. Negative for nausea and vomiting. All other systems reviewed and are negative. Vitals:    18 1935 18 2030 18   BP: 134/73 121/59    Pulse: 84     Resp: 16     Temp: 98.1 °F (36.7 °C)     SpO2: 97% 95% 95%   Weight: 77.1 kg (170 lb)     Height: 5' 8\" (1.727 m)              Physical Exam   Constitutional: He is oriented to person, place, and time. He appears well-developed and well-nourished. HENT:   Head: Normocephalic and atraumatic. Eyes: Conjunctivae are normal. Pupils are equal, round, and reactive to light. Neck: Normal range of motion. Neck supple. Cardiovascular: Normal rate and regular rhythm.     Pulmonary/Chest: Effort normal and breath sounds normal.   Abdominal: Soft. Bowel sounds are normal.   Genitourinary: Rectal exam shows guaiac positive stool. Genitourinary Comments: Black stool present in rectal vault which is strongly heme positive   Musculoskeletal: He exhibits no edema or tenderness. Neurological: He is alert and oriented to person, place, and time. Skin: Skin is warm and dry. Psychiatric: He has a normal mood and affect. His behavior is normal.   Nursing note and vitals reviewed. MDM  Number of Diagnoses or Management Options  Diagnosis management comments: 11:09 PM spoke with Dr. Gerhardt Phenes, discussed details of case. He recommends stopping the Plavix and continuing the aspirin. Patient states his last intervention was many years ago. He has seen GI in the past, can follow[facility] with [facility].        Amount and/or Complexity of Data Reviewed  Clinical lab tests: ordered and reviewed  Discuss the patient with other providers: yes    Risk of Complications, Morbidity, and/or Mortality  Presenting problems: moderate  Diagnostic procedures: moderate  Management options: moderate    Patient Progress  Patient progress: stable        ED Course       Procedures

## 2018-05-19 NOTE — IP AVS SNAPSHOT
[address] 
[phone] Patient: Sarkis Brasher MRN: [id number] PNF:[id number] About your hospitalization You were admitted on:  May 19, 2018 You last received care in the:  [facility] 6 MED SURG You were discharged on:  May 22, 2018 Why you were hospitalized Your primary diagnosis was:  Upper Gi Bleed Your diagnoses also included:  Coronary Artery Disease Involving Native Coronary Artery Of Native Heart Without Angina Pectoris, Restless Leg Syndrome, Hypertension, Clint (Obstructive Sleep Apnea), Acute Blood Loss Anemia Follow-up Information Follow up With Details Comments Contact Info Gavin Vang MD   [address] 
[address] [facility] [address] 
[phone] [facility]  as scheduled [place] [address] 
[phone] Discharge Orders None A check jose david indicates which time of day the medication should be taken. My Medications START taking these medications Instructions Each Dose to Equal  
 Morning Noon Evening Bedtime  
 pantoprazole 40 mg tablet Commonly known as:  PROTONIX Your next dose is: This evening Take 1 Tab by mouth two (2) times a day. 40 mg CHANGE how you take these medications Instructions Each Dose to Equal  
 Morning Noon Evening Bedtime  
 nitroglycerin 0.4 mg SL tablet Commonly known as:  NITROSTAT What changed:  reasons to take this Your next dose is: Take on as needed schedule 1 Tab by SubLINGual route every five (5) minutes as needed for Chest Pain (Last reported use was 4/12/15 approx). 0.4 mg  
    
   
   
   
  
  
CONTINUE taking these medications Instructions Each Dose to Equal  
 Morning Noon Evening Bedtime ALLEGRA-D 12 HOUR  mg Tb12 Generic drug:  fexofenadine-pseudoephedrine Your next dose is: Take on as needed schedule Take 1 Tab by mouth daily as needed. Indications: Allergic Rhinitis 1 Tab  
    
   
   
   
  
 amitriptyline 10 mg tablet Commonly known as:  ELAVIL Your next dose is:  Tomorrow Morning TAKE 1 TABLET ONCE DAILY   FOR GASTROINTESTINAL UPSET PREVENTION/IRRITABLE BOWEL SYNDROME aspirin delayed-release 81 mg tablet Your next dose is:  Tomorrow Morning Take 81 mg by mouth daily. Take / use AM day of surgery  per anesthesia protocols. 81 mg  
    
  
   
   
   
  
 clotrimazole-betamethasone topical cream  
Commonly known as:  Dorrufus Iqbalr Your next dose is: This evening Apply  to affected area two (2) times a day. cpap machine kit  
   
 by Does Not Apply route. FISH OIL 1,000 mg Cap Generic drug:  omega-3 fatty acids-vitamin e Your next dose is:  Tomorrow Morning Take 2 Caps by mouth every morning. 2 Cap HYDROcodone-acetaminophen 5-325 mg per tablet Commonly known as:  Zaheer Lofton Your last dose was:  5/21 12:30 am  
  
Your next dose is: Take on as needed schedule Take 0.5-1 Tabs by mouth every four (4) hours as needed for Pain. Max Daily Amount: 6 Tabs. 0.5-1 Tab  
    
   
   
   
  
 isosorbide mononitrate ER 30 mg tablet Commonly known as:  IMDUR Your next dose is:  Tomorrow Morning Take 1 tablet by mouth once daily  Indications: CHRONIC STABLE ANGINA PECTORIS  
     
  
   
   
   
  
 lisinopril 5 mg tablet Commonly known as:  Isabel José Your next dose is:  Tomorrow Morning Take 1 Tab by mouth daily. Indications: hypertension 5 mg  
    
  
   
   
   
  
 metoprolol succinate 50 mg XL tablet Commonly known as:  TOPROL-XL Your next dose is:  Tomorrow Morning Take 1 tablet by mouth once daily  Indications: hypertension pramipexole 0.5 mg tablet Commonly known as:  MIRAPEX Your next dose is: This evening Take 1 Tab by mouth nightly. Indications: Restless Legs Syndrome 0.5 mg  
    
   
   
  
   
  
 PRESERVISION AREDS 2 PO Your next dose is:  Tomorrow Morning Take 2 Caps by mouth daily. 2 Cap  
    
  
   
   
   
  
 simvastatin 20 mg tablet Commonly known as:  ZOCOR Your next dose is: This evening Take 1 Tab by mouth nightly. Indications: mixed hyperlipidemia 20 mg  
    
   
   
  
   
  
 TYLENOL ARTHRITIS PAIN 650 mg Tber Generic drug:  acetaminophen Your next dose is: Take on as needed schedule Take 650 mg by mouth every six (6) hours as needed for Pain. Take / use AM day of surgery  per anesthesia protocols if needed 650 mg  
    
   
   
   
  
 verapamil  mg CR tablet Commonly known as:  CALAN-SR Your next dose is:  Tomorrow Morning Take 1 Tab by mouth daily. Indications: hypertension 240 mg  
    
  
   
   
   
  
 VITAMIN D3 1,000 unit Cap Generic drug:  cholecalciferol Your next dose is:  Tomorrow Morning Take 1,000 Units by mouth daily. 1000 Units STOP taking these medications   
 valACYclovir 1 gram tablet Commonly known as:  VALTREX  
   
  
 ZANTAC 150 mg tablet Generic drug:  raNITIdine Where to Get Your Medications Information on where to get these meds will be given to you by the nurse or doctor. ! Ask your nurse or doctor about these medications  
  pantoprazole 40 mg tablet Opioid Education Prescription Opioids: What You Need to Know: 
 
 
 
F-face looks uneven A-arms unable to move or move unevenly S-speech slurred or non-existent T-time-call 911 as soon as signs and symptoms begin-DO NOT go Back to bed or wait to see if you get better-TIME IS BRAIN. Warning Signs of HEART ATTACK Call 911 if you have these symptoms: 
? Chest discomfort. Most heart attacks involve discomfort in the center of the chest that lasts more than a few minutes, or that goes away and comes back. It can feel like uncomfortable pressure, squeezing, fullness, or pain. ? Discomfort in other areas of the upper body. Symptoms can include pain or discomfort in one or both arms, the back, neck, jaw, or stomach. ? Shortness of breath with or without chest discomfort. ? Other signs may include breaking out in a cold sweat, nausea, or lightheadedness. Don't wait more than five minutes to call [address]! Fast action can save your life. Calling 911 is almost always the fastest way to get lifesaving treatment. Emergency Medical Services staff can begin treatment when they arrive  up to an hour sooner than if someone gets to the hospital by car. The discharge information has been reviewed with the patient. The patient verbalized understanding. Discharge medications reviewed with the patient and appropriate educational materials and side effects teaching were provided. ___________________________________________________________________________________________________________________________________ Gastrointestinal Bleeding: Care Instructions Your Care Instructions The digestive or gastrointestinal tract goes from the mouth to the anus. It is often called the GI tract. Bleeding can happen anywhere in the GI tract. It may be caused by an ulcer, an infection, or cancer. It may also be caused by medicines such as aspirin or ibuprofen. Light bleeding may not cause any symptoms at first. But if you continue to bleed for a while, you may feel very weak or tired. Sudden, heavy bleeding means you need to see a doctor right away. This kind of bleeding can be very dangerous. But it can usually be cured or controlled. The doctor may do some tests to find the cause of your bleeding. Follow-up care is a key part of your treatment and safety. Be sure to make and go to all appointments, and call your doctor if you are having problems. It's also a good idea to know your test results and keep a list of the medicines you take. How can you care for yourself at home? · Be safe with medicines. Take your medicines exactly as prescribed. Call your doctor if you think you are having a problem with your medicine. You will get more details on the specific medicines your doctor prescribes. · Do not take aspirin or other anti-inflammatory medicines, such as naproxen (Aleve) or ibuprofen (Advil, Motrin), without talking to your doctor first. Ask your doctor if it is okay to use acetaminophen (Tylenol). · Do not drink alcohol. · The bleeding may make you lose iron. So it's important to eat foods that have a lot of iron. These include red meat, shellfish, poultry, and eggs. They also include beans, raisins, whole-grain breads, and leafy green vegetables. If you want help planning meals, you can make an appointment with a dietitian. When should you call for help? Call 911 anytime you think you may need emergency care. For example, call if: 
? · You have sudden, severe belly pain. ? · You vomit blood or what looks like coffee grounds. ? · You passed out (lost consciousness). ? · Your stools are maroon or very bloody. ?Call your doctor now or seek immediate medical care if: 
? · You are dizzy or lightheaded, or you feel like you may faint. ? · Your stools are black and look like tar, or they have streaks of blood. ? · You have belly pain. ? · You vomit or have nausea. ? · You have trouble swallowing, or it hurts when you swallow. ? Watch closely for changes in your health, and be sure to contact your doctor if: 
? · You do not get better as expected. Where can you learn more? Go to http://lisa-aranza.info/. Enter C826 in the search box to learn more about \"Gastrointestinal Bleeding: Care Instructions. \" Current as of: March 20, 2017 Content Version: 11.4 © 6728-1873 [employer]. Care instructions adapted under license by [employer] (which disclaims liability or warranty for this information). If you have questions about a medical condition or this instruction, always ask your healthcare professional. [place] [address] any warranty or liability for your use of this information. ACO Transitions of Care Introducing [employer] [address] offers a voluntary care coordination program to provide high quality service and care to [facility] fee-for-service beneficiaries. Lin Camacho was designed to help you enhance your health and well-being through the following services: ? Transitions of Care  support for individuals who are transitioning from one care setting to another (example: Hospital to home). ? Chronic and Complex Care Coordination  support for individuals and caregivers of those with serious or chronic illnesses or with more than one chronic (ongoing) condition and those who take a number of different medications.   
 
 
If you meet specific medical criteria, a [facility] Coordinator may call you directly to coordinate your care with your primary care physician and your other care providers. For questions about the [facility] programs, please, contact your physicians office. For general questions or additional information about Accountable Care Organizations: 
Please visit www.medicare.gov/acos. html or call 1-800-MEDICARE (7-844.509.3992) TTY users should call 8-546.141.4525. [employer] Announcement We are excited to announce that we are making your provider's discharge notes available to you in [employer]. You will see these notes when they are completed and signed by the physician that discharged you from your recent hospital stay. If you have any questions or concerns about any information you see in [employer], please call the Health Information Department where you were seen or reach out to your Primary Care Provider for more information about your plan of care. Introducing [facility]! [facility] introduces [employer] patient portal. Now you can access parts of your medical record, email your doctor's office, and request medication refills online. 1. In your internet browser, go to [email or web address] 2. Click on the First Time User? Click Here link in the Sign In box. You will see the New Member Sign Up page. 3. Enter your [employer] Access Code exactly as it appears below. You will not need to use this code after youve completed the sign-up process. If you do not sign up before the expiration date, you must request a new code. · [employer] Access Code: [email or web address] Expires: 8/20/2018  2:44 PM 
 
4. Enter the last four digits of your Social Security Number (xxxx) and Date of Birth (mm/dd/yyyy) as indicated and click Submit. You will be taken to the next sign-up page. 5. Create a [employer] ID. This will be your [employer] login ID and cannot be changed, so think of one that is secure and easy to remember. 6. Create a [employer] password. You can change your password at any time. 7. Enter your Password Reset Question and Answer. This can be used at a later time if you forget your password. 8. Enter your e-mail address. You will receive e-mail notification when new information is available in [address] 9. Click Sign Up. You can now view and download portions of your medical record. 10. Click the Download Summary menu link to download a portable copy of your medical information. If you have questions, please visit the Frequently Asked Questions section of the [employer] website. Remember, [employer] is NOT to be used for urgent needs. For medical emergencies, dial 911. Now available from your iPhone and Android! Introducing José Antonio Rico As a [facility] patient, I wanted to make you aware of our electronic visit tool called José Antonio Edwardsfin. [facility] 24/7 allows you to connect within minutes with a medical provider 24 hours a day, seven days a week via a mobile device or tablet or logging into a secure website from your computer. You can access José Antonio Rico from anywhere in the [place]. A virtual visit might be right for you when you have a simple condition and feel like you just dont want to get out of bed, or cant get away from work for an appointment, when your regular [facility] provider is not available (evenings, weekends or holidays), or when youre out of town and need minor care. Electronic visits cost only $49 and if the [employer] 24/7 provider determines a prescription is needed to treat your condition, one can be electronically transmitted to a nearby pharmacy*. Please take a moment to enroll today if you have not already done so. The enrollment process is free and takes just a few minutes. To enroll, please download the [employer] 24/7 alexia to your tablet or phone, or visit [email or web address] to enroll on your computer. And, as an [address] [place] [address] patient with a [employer] account, the results of your visits will be scanned into your electronic medical record and your primary care provider will be able to view the scanned results. We urge you to continue to see your regular [facility] provider for your ongoing medical care. And while your primary care provider may not be the one available when you seek a José Antonio Edwardsfin virtual visit, the peace of mind you get from getting a real diagnosis real time can be priceless. For more information on [employer], view our Frequently Asked Questions (FAQs) at [email or web address]. Sincerely, 
 
Marah Duncan MD 
Chief Medical Officer [address] *:  certain medications cannot be prescribed via [employer] Providers Seen During Your Hospitalization Provider Specialty Primary office phone Miguel Angel Don MD Emergency Medicine [phone] Dann Tovar MD Internal Medicine [phone] Immunizations Administered for This Admission Name Date  
 TB Skin Test (PPD) Intradermal  Deferred () Your Primary Care Physician (PCP) Primary Care Physician Office Phone Office Fax Johnna Newby [phone] You are allergic to the following Allergen Reactions Augmentin (Amoxicillin-Pot Clavulanate) Nausea and Vomiting Severe abd cramping Severe abd cramping Oxycodone Unknown (comments) Sever headaches, abdominal issues Recent Documentation Height Weight BMI Smoking Status 1.727 m 77.1 kg 25.85 kg/m2 Former Smoker Emergency Contacts Name Discharge Info Relation Home Work Mobile Jada Beltran  Spouse [3] [phone] Patient Belongings The following personal items are in your possession at time of discharge: 
  Dental Appliances: None  Visual Aid: Glasses          Jewelry: None       Other Valuables: None Please provide this summary of care documentation to your next provider. Signatures-by signing, you are acknowledging that this After Visit Summary has been reviewed with you and you have received a copy. Patient Signature:  ____________________________________________________________ Date:  ____________________________________________________________  
  
[facility] Provider Signature:  ____________________________________________________________ Date:  ____________________________________________________________

## 2018-05-19 NOTE — ED TRIAGE NOTES
Pt c/o abd pain and dark tarry stool. Eliz Doyle sts he was seen last night and has gotten worse. .. sts he feels sob and weak

## 2018-05-19 NOTE — ED NOTES
I have reviewed discharge instructions with the patient. The patient verbalized understanding. Patient left ED via Discharge Method: ambulatory to Home with wife. Opportunity for questions and clarification provided. Patient given 0 scripts. To continue your aftercare when you leave the hospital, you may receive an automated call from our care team to check in on how you are doing. This is a free service and part of our promise to provide the best care and service to meet your aftercare needs.  If you have questions, or wish to unsubscribe from this service please call [phone]. Thank you for Choosing our [facility] Emergency Department.

## 2018-05-19 NOTE — DISCHARGE INSTRUCTIONS
Lower Gastrointestinal Bleeding: Care Instructions  Your Care Instructions    The digestive or gastrointestinal tract goes from the mouth to the anus. It is often called the GI tract. Bleeding in the lower GI tract can happen anywhere in your small or large intestine. It can also happen in your rectum or anus. In some cases, it is caused by an infection, cancer, or inflammatory bowel disease. Or it may be caused by hemorrhoids, diverticulitis, or clotting problems. Light bleeding may not cause any symptoms at first. But if you continue to bleed for a while, you may feel very weak or tired. Sudden, heavy bleeding means you need to see a doctor right away. This kind of bleeding can be very dangerous. But it can usually be cured or controlled. The doctor may do some tests to find the cause of your bleeding. Follow-up care is a key part of your treatment and safety. Be sure to make and go to all appointments, and call your doctor if you are having problems. It's also a good idea to know your test results and keep a list of the medicines you take. How can you care for yourself at home? · Be safe with medicines. Take your medicines exactly as prescribed. Call your doctor if you think you are having a problem with your medicine. You will get more details on the specific medicines your doctor prescribes. · Do not take aspirin or other anti-inflammatory medicines, such as naproxen (Aleve) or ibuprofen (Advil, Motrin), without talking to your doctor first. Ask your doctor if it is okay to use acetaminophen (Tylenol). · Do not drink alcohol. · The bleeding may make you lose iron. So it's important to eat foods that have a lot of iron. These include red meat, shellfish, poultry, and eggs. They also include beans, raisins, whole-grain breads, and leafy green vegetables. If you want help planning meals, you can meet with a dietitian. When should you call for help?   Call 911 anytime you think you may need emergency care. For example, call if:  ? · You have sudden, severe belly pain. ? · You vomit blood or what looks like coffee grounds. ? · You passed out (lost consciousness). ? · Your stools are maroon or very bloody. ?Call your doctor now or seek immediate medical care if:  ? · You are dizzy or lightheaded, or you feel like you may faint. ? · Your stools are black and look like tar, or they have streaks of blood. ? · You have belly pain. ? · You vomit or have nausea. ? Watch closely for changes in your health, and be sure to contact your doctor if you do not get better as expected. Where can you learn more? Go to http://lisa-aranza.info/. Enter F326 in the search box to learn more about \"Lower Gastrointestinal Bleeding: Care Instructions. \"  Current as of: March 20, 2017  Content Version: 11.4  © 4180-9717 [employer]. Care instructions adapted under license by [employer] (which disclaims liability or warranty for this information). If you have questions about a medical condition or this instruction, always ask your healthcare professional. [place] [address] any warranty or liability for your use of this information.

## 2018-05-20 LAB
ANION GAP SERPL CALC-SCNC: 6 MMOL/L (ref 7–16)
BUN SERPL-MCNC: 23 MG/DL (ref 8–23)
CALCIUM SERPL-MCNC: 8 MG/DL (ref 8.3–10.4)
CHLORIDE SERPL-SCNC: 109 MMOL/L (ref 98–107)
CO2 SERPL-SCNC: 28 MMOL/L (ref 21–32)
CREAT SERPL-MCNC: 0.59 MG/DL (ref 0.8–1.5)
GLUCOSE SERPL-MCNC: 94 MG/DL (ref 65–100)
HCT VFR BLD AUTO: 22.8 % (ref 41.1–50.3)
HCT VFR BLD AUTO: 22.9 % (ref 41.1–50.3)
HCT VFR BLD AUTO: 28.5 % (ref 41.1–50.3)
HGB BLD-MCNC: 7.6 G/DL (ref 13.6–17.2)
HGB BLD-MCNC: 7.6 G/DL (ref 13.6–17.2)
HGB BLD-MCNC: 9.5 G/DL (ref 13.6–17.2)
POTASSIUM SERPL-SCNC: 4.1 MMOL/L (ref 3.5–5.1)
SODIUM SERPL-SCNC: 143 MMOL/L (ref 136–145)

## 2018-05-20 PROCEDURE — P9016 RBC LEUKOCYTES REDUCED: HCPCS | Performed by: EMERGENCY MEDICINE

## 2018-05-20 PROCEDURE — 74011250637 HC RX REV CODE- 250/637: Performed by: INTERNAL MEDICINE

## 2018-05-20 PROCEDURE — 80048 BASIC METABOLIC PNL TOTAL CA: CPT | Performed by: INTERNAL MEDICINE

## 2018-05-20 PROCEDURE — 74011000250 HC RX REV CODE- 250: Performed by: INTERNAL MEDICINE

## 2018-05-20 PROCEDURE — 85018 HEMOGLOBIN: CPT | Performed by: INTERNAL MEDICINE

## 2018-05-20 PROCEDURE — 30233N1 TRANSFUSION OF NONAUTOLOGOUS RED BLOOD CELLS INTO PERIPHERAL VEIN, PERCUTANEOUS APPROACH: ICD-10-PCS | Performed by: INTERNAL MEDICINE

## 2018-05-20 PROCEDURE — 65270000029 HC RM PRIVATE

## 2018-05-20 PROCEDURE — 36415 COLL VENOUS BLD VENIPUNCTURE: CPT | Performed by: INTERNAL MEDICINE

## 2018-05-20 PROCEDURE — C9113 INJ PANTOPRAZOLE SODIUM, VIA: HCPCS | Performed by: INTERNAL MEDICINE

## 2018-05-20 PROCEDURE — 74011250636 HC RX REV CODE- 250/636: Performed by: INTERNAL MEDICINE

## 2018-05-20 PROCEDURE — 77030039270 HC TU BLD FLTR CARD -A

## 2018-05-20 PROCEDURE — 36430 TRANSFUSION BLD/BLD COMPNT: CPT

## 2018-05-20 RX ORDER — SODIUM CHLORIDE 9 MG/ML
250 INJECTION, SOLUTION INTRAVENOUS AS NEEDED
Status: DISCONTINUED | OUTPATIENT
Start: 2018-05-20 | End: 2018-05-22 | Stop reason: HOSPADM

## 2018-05-20 RX ADMIN — AMITRIPTYLINE HYDROCHLORIDE 10 MG: 10 TABLET, FILM COATED ORAL at 22:02

## 2018-05-20 RX ADMIN — METOPROLOL SUCCINATE 50 MG: 50 TABLET, EXTENDED RELEASE ORAL at 10:28

## 2018-05-20 RX ADMIN — SODIUM CHLORIDE 10 ML: 9 INJECTION, SOLUTION INTRAMUSCULAR; INTRAVENOUS; SUBCUTANEOUS at 05:59

## 2018-05-20 RX ADMIN — PRAMIPEXOLE DIHYDROCHLORIDE 0.5 MG: 0.25 TABLET ORAL at 22:02

## 2018-05-20 RX ADMIN — LISINOPRIL 5 MG: 5 TABLET ORAL at 10:28

## 2018-05-20 RX ADMIN — SODIUM CHLORIDE 40 MG: 9 INJECTION INTRAMUSCULAR; INTRAVENOUS; SUBCUTANEOUS at 10:27

## 2018-05-20 RX ADMIN — ISOSORBIDE MONONITRATE 30 MG: 30 TABLET, EXTENDED RELEASE ORAL at 10:28

## 2018-05-20 RX ADMIN — ACETAMINOPHEN 650 MG: 325 TABLET ORAL at 19:06

## 2018-05-20 RX ADMIN — SODIUM CHLORIDE 100 ML/HR: 900 INJECTION, SOLUTION INTRAVENOUS at 10:27

## 2018-05-20 RX ADMIN — SODIUM CHLORIDE 40 MG: 9 INJECTION INTRAMUSCULAR; INTRAVENOUS; SUBCUTANEOUS at 22:02

## 2018-05-20 RX ADMIN — VERAPAMIL HYDROCHLORIDE 240 MG: 240 TABLET, FILM COATED, EXTENDED RELEASE ORAL at 10:28

## 2018-05-20 RX ADMIN — SIMVASTATIN 10 MG: 10 TABLET, FILM COATED ORAL at 22:02

## 2018-05-20 RX ADMIN — SODIUM CHLORIDE 10 ML: 9 INJECTION, SOLUTION INTRAMUSCULAR; INTRAVENOUS; SUBCUTANEOUS at 15:57

## 2018-05-20 RX ADMIN — SODIUM CHLORIDE 10 ML: 9 INJECTION, SOLUTION INTRAMUSCULAR; INTRAVENOUS; SUBCUTANEOUS at 22:45

## 2018-05-20 RX ADMIN — HYDROCODONE BITARTRATE AND ACETAMINOPHEN 1 TABLET: 5; 325 TABLET ORAL at 14:31

## 2018-05-20 NOTE — H&P
HOSPITALIST H&P      NAME:  Cindi Brown   Age:  78 y.o.  :   1940   MRN:   [id number]    PCP: Ken Felix MD    Attending MD: Natalie Adam MD    Treatment Team: Attending Provider: Christina Irving MD; Primary Nurse: Albert Reese RN    HPI:     Cinid Brown is a 60RWC with CAD on ASA and plavix, HTN, HLD who presented to [facility] ED with 2 days of melenic stool. He has had 4 dark, tarry BMs today. He has had some worsening weakness and TIDWELL today as well. Also with mild nausea, lower quadrant abd pain and mild epigastric discomfort. No hematemesis, hematochezia. Had a GIB from PUD about 10yrs ago. Not on PPI now. Very rarely takes NSAIDs, had 1 aleve last week. No ETOH. In the ED, rectal exam is heme positive. Hgb has dropped from 11 to 9.2 in last 24hrs. Hospitalist asked to admit for symptomatic anemia and UGIB. Complete ROS done and is as stated in HPI or otherwise negative    Past Medical History:   Diagnosis Date    Allergic rhinitis     Aortic insufficiency     Aortic sclerosis     no stenosis- ECHO 14  LVEF 50-55%    BPH with urinary obstruction     resolved with surgery    CAD (coronary artery disease)     stent X1 in . .managed with medications    Chronic pain     right shoulder    Depression     Dyslipidemia     managed with medications    Former pipe smoker     stopped at age 30    GERD (gastroesophageal reflux disease)     managed with PRN medications    GI bleed     from Plavix    History of depression     Hypercholesterolemia     Hypertension     controlled w/med    IBS (irritable bowel syndrome)     manged with medications    Macular degeneration     managed with medications    Old MI (myocardial infarction)     x2; last 2014    Osteoarthritis     in shoulders and back    PUD (peptic ulcer disease) 3/2011    no recent episodes    Restless leg syndrome     manged with medications    Seasonal allergic rhinitis     PRN medications    Unspecified sleep apnea     wears cpap        Past Surgical History:   Procedure Laterality Date    HX CATARACT REMOVAL Bilateral     HX COLONOSCOPY      HX HEART CATHETERIZATION  2010, 3/2011,4/2011    (1)stent 2010     HX HEART CATHETERIZATION  9/2014    no stent required    HX SHOULDER ARTHROSCOPY Right 2010 approx    HX SHOULDER REPLACEMENT Right 2015    HX TONSIL AND ADENOIDECTOMY      as a child    HX TURP  05/15; 07/15        Prior to Admission Medications   Prescriptions Last Dose Informant Patient Reported? Taking? HYDROcodone-acetaminophen (NORCO) 5-325 mg per tablet   No No   Sig: Take 0.5-1 Tabs by mouth every four (4) hours as needed for Pain. Max Daily Amount: 6 Tabs. VIT C/E/ZN/COPPR/LUTEIN/ZEAXAN (PRESERVISION AREDS 2 PO)   Yes No   Sig: Take 2 Caps by mouth daily. acetaminophen (TYLENOL ARTHRITIS PAIN) 650 mg CR tablet   Yes No   Sig: Take 650 mg by mouth every six (6) hours as needed for Pain. Take / use AM day of surgery  per anesthesia protocols if needed   amitriptyline (ELAVIL) 10 mg tablet   No No   Sig: TAKE 1 TABLET ONCE DAILY   FOR GASTROINTESTINAL UPSET PREVENTION/IRRITABLE BOWEL SYNDROME   aspirin delayed-release 81 mg tablet   Yes No   Sig: Take 81 mg by mouth daily. Take / use AM day of surgery  per anesthesia protocols. cholecalciferol (VITAMIN D3) 1,000 unit cap   Yes No   Sig: Take 1,000 Units by mouth daily. clotrimazole-betamethasone (LOTRISONE) topical cream   No No   Sig: Apply  to affected area two (2) times a day. cpap machine kit   Yes No   Sig: by Does Not Apply route. fexofenadine-pseudoephedrine (ALLEGRA-D 12 HOUR)  mg per tablet   Yes No   Sig: Take 1 Tab by mouth daily as needed. Indications: Allergic Rhinitis   isosorbide mononitrate ER (IMDUR) 30 mg tablet   No No   Sig: Take 1 tablet by mouth once daily  Indications: CHRONIC STABLE ANGINA PECTORIS   lisinopril (PRINIVIL, ZESTRIL) 5 mg tablet   No No   Sig: Take 1 Tab by mouth daily.  Indications: hypertension   metoprolol succinate (TOPROL-XL) 50 mg XL tablet   No No   Sig: Take 1 tablet by mouth once daily  Indications: hypertension   nitroglycerin (NITROSTAT) 0.4 mg SL tablet   No No   Si Tab by SubLINGual route every five (5) minutes as needed for Chest Pain (Last reported use was 4/12/15 approx). Patient taking differently: 0.4 mg by SubLINGual route every five (5) minutes as needed for Chest Pain. omega-3 fatty acids-vitamin e (FISH OIL) 1,000 mg cap   Yes No   Sig: Take 2 Caps by mouth every morning. pramipexole (MIRAPEX) 0.5 mg tablet   No No   Sig: Take 1 Tab by mouth nightly. Indications: Restless Legs Syndrome   ranitidine (ZANTAC) 150 mg tablet   Yes No   Sig: Take 150 mg by mouth daily as needed for Indigestion. Indications: GASTROESOPHAGEAL REFLUX   simvastatin (ZOCOR) 20 mg tablet   No No   Sig: Take 1 Tab by mouth nightly. Indications: mixed hyperlipidemia   valACYclovir (VALTREX) 1 gram tablet   No No   Sig: Take 1 Tab by mouth three (3) times daily. verapamil ER (CALAN-SR) 240 mg CR tablet   No No   Sig: Take 1 Tab by mouth daily.  Indications: hypertension      Facility-Administered Medications: None       Allergies   Allergen Reactions    Augmentin [Amoxicillin-Pot Clavulanate] Nausea and Vomiting     Severe abd cramping  Severe abd cramping    Oxycodone Unknown (comments)     Sever headaches, abdominal issues        Social History   Substance Use Topics    Smoking status: Former Smoker     Packs/day: 0.00     Years: 0.00    Smokeless tobacco: Never Used      Comment: quit smoking pipe --- no cigs    Alcohol use No        Family History   Problem Relation Age of Onset    Heart Disease Mother     High Cholesterol Mother     Hypertension Mother     Coronary Artery Disease Mother     Cancer Father      lung cancer--    Heart Disease Brother     Hypertension Brother         Objective:       Visit Vitals    /56    Pulse 75    Temp 98.5 °F (36.9 °C)  Resp 18    Ht 5' 8\" (1.727 m)    Wt 77.1 kg (170 lb)    SpO2 96%    BMI 25.85 kg/m2        Temp (24hrs), Av.5 °F (36.9 °C), Min:98.5 °F (36.9 °C), Max:98.5 °F (36.9 °C)      Oxygen Therapy  O2 Sat (%): 96 % (18)  Pulse via Oximetry: 75 beats per minute (18)  O2 Device: Room air (18)      Physical Exam:      General:    Alert, cooperative, NAD  Eyes:   PERRL EOMI Anicteric  Head:   Normocephalic, without obvious abnormality, atraumatic. ENT:  Nares normal. MMM  Lungs:   Clear to auscultation bilaterally. No Wheezing  Heart:   Regular rate and rhythm,  no BLE edema  Abdomen:   Soft, mild epigastric tenderness, +NABS  MSK:  Spontaneously moves extremities. No deformities/lesions. Skin:     Texture, turgor normal. Not Jaundiced. Neurologic: No focal deficits   Psychiatry:      No depression/anxiety. Mood congruent for context. Heme/Lymph/Immune: No petechiae, echymoses, overt signs of bleeding or lymphadenopathy. Data Review:   Recent Results (from the past 24 hour(s))   CBC WITH AUTOMATED DIFF    Collection Time: 18  7:48 PM   Result Value Ref Range    WBC 10.1 4.3 - 11.1 K/uL    RBC 3.10 (L) 4.23 - 5.67 M/uL    HGB 9.2 (L) 13.6 - 17.2 g/dL    HCT 27.5 (L) 41.1 - 50.3 %    MCV 88.7 79.6 - 97.8 FL    MCH 29.7 26.1 - 32.9 PG    MCHC 33.5 31.4 - 35.0 g/dL    RDW 13.6 11.9 - 14.6 %    PLATELET 670 359 - 337 K/uL    MPV 9.9 (L) 10.8 - 14.1 FL    DF AUTOMATED      NEUTROPHILS 68 43 - 78 %    LYMPHOCYTES 25 13 - 44 %    MONOCYTES 6 4.0 - 12.0 %    EOSINOPHILS 1 0.5 - 7.8 %    BASOPHILS 0 0.0 - 2.0 %    IMMATURE GRANULOCYTES 0 0.0 - 5.0 %    ABS. NEUTROPHILS 6.9 1.7 - 8.2 K/UL    ABS. LYMPHOCYTES 2.5 0.5 - 4.6 K/UL    ABS. MONOCYTES 0.6 0.1 - 1.3 K/UL    ABS. EOSINOPHILS 0.1 0.0 - 0.8 K/UL    ABS. BASOPHILS 0.0 0.0 - 0.2 K/UL    ABS. IMM.  GRANS. 0.0 0.0 - 0.5 K/UL   METABOLIC PANEL, COMPREHENSIVE    Collection Time: 18  7:48 PM   Result Value Ref Range Sodium 141 136 - 145 mmol/L    Potassium 4.2 3.5 - 5.1 mmol/L    Chloride 106 98 - 107 mmol/L    CO2 29 21 - 32 mmol/L    Anion gap 6 (L) 7 - 16 mmol/L    Glucose 106 (H) 65 - 100 mg/dL    BUN 33 (H) 8 - 23 MG/DL    Creatinine 0.76 (L) 0.8 - 1.5 MG/DL    GFR est AA >60 >60 ml/min/1.73m2    GFR est non-AA >60 >60 ml/min/1.73m2    Calcium 8.4 8.3 - 10.4 MG/DL    Bilirubin, total 0.5 0.2 - 1.1 MG/DL    ALT (SGPT) 22 12 - 65 U/L    AST (SGOT) 21 15 - 37 U/L    Alk. phosphatase 68 50 - 136 U/L    Protein, total 6.1 (L) 6.3 - 8.2 g/dL    Albumin 3.1 (L) 3.2 - 4.6 g/dL    Globulin 3.0 2.3 - 3.5 g/dL    A-G Ratio 1.0 (L) 1.2 - 3.5     TROPONIN I    Collection Time: 05/19/18  7:48 PM   Result Value Ref Range    Troponin-I, Qt. 0.03 0.02 - 0.05 NG/ML   TYPE & SCREEN    Collection Time: 05/19/18  7:48 PM   Result Value Ref Range    Crossmatch Expiration 05/22/2018     ABO/[employer] POSITIVE     Antibody screen NEG    PROTHROMBIN TIME + INR    Collection Time: 05/19/18  7:48 PM   Result Value Ref Range    Prothrombin time 14.4 11.5 - 14.5 sec    INR 1.2         Imaging /Procedures /Studies   XR CHEST PA LAT   Final Result   IMPRESSION: Minimal linear bibasilar atelectasis. Markedly elevated right hemidiaphragm. Assessment and Plan:        Active Hospital Problems    Diagnosis Date Noted    Upper GI bleed 05/19/2018    Acute blood loss anemia 05/19/2018    Coronary artery disease involving native coronary artery of native heart without angina pectoris 11/14/2016    DAVID (obstructive sleep apnea) 11/14/2016    Restless leg syndrome      manged with medications      Hypertension      controlled w/med         PLAN  - UGIB/ABLA:  PPI IV BID and IVF overnight  Hgb q8h, tx < 8 given CAD  GI consult in AM  Clear liquids, NPO at MN in case can do EGD tomorrow    - CAD:  Holding ASA and plavix   Continue BB, imdur    - HTN:  Continue verapamil, metop, imdur and lisinopril    - RLS:  Continue mirapex    - DAVID: Continue home CPAP    Code Status: FULL  DVT Prophylaxis: SCDs    Anticipated discharge: 2-3 days      Bandar Woods MD  8:55 PM

## 2018-05-20 NOTE — ED NOTES
TRANSFER - OUT REPORT:    Verbal report given to Rena on Connor Puckett  being transferred to  for routine progression of care       Report consisted of patients Situation, Background, Assessment and   Recommendations(SBAR). Information from the following report(s) SBAR, ED Summary, MAR, Recent Results and Cardiac Rhythm NSR was reviewed with the receiving nurse. Lines:   Peripheral IV 05/19/18 Right Antecubital (Active)   Site Assessment Clean, dry, & intact 5/19/2018  9:18 PM   Phlebitis Assessment 0 5/19/2018  9:18 PM   Infiltration Assessment 0 5/19/2018  9:18 PM   Dressing Status Clean, dry, & intact 5/19/2018  9:18 PM   Alcohol Cap Used No 5/19/2018  9:18 PM        Opportunity for questions and clarification was provided.       Patient transported with:   [employer]

## 2018-05-20 NOTE — CONSULTS
[facility] Consult Note    Referring Physician:  Dr. Fenton Babinski Date:  5/20/2018    Admit Date:  5/19/2018    Chief Complaint:  Melena and ABLA    Subjective:     History of Present Illness:  Patient is a 78 y.o. WM with hx of CAD on Plavix and asa, (stent 15 years ago and MI 2014) who is seen in consultation at the request of Dr. Janel Torres for dark stools. Hg was checked serially last three days and dropped from 11 to 7.6. No stools over night or so far today and no hematemesis. Had PUD many years ago, h.pylori neg and no etiology assigned. Had one Aleve in the past week but no regular NSAIDS and no etoh/tob. No pain, GERD or other symptoms. Plavix held yest and ASA held today. PMH:  Past Medical History:   Diagnosis Date    Allergic rhinitis     Aortic insufficiency     Aortic sclerosis     no stenosis- ECHO 9/8/14  LVEF 50-55%    BPH with urinary obstruction     resolved with surgery    CAD (coronary artery disease)     stent X1 in 2010. .managed with medications    Chronic pain     right shoulder    Depression     Dyslipidemia     managed with medications    Former pipe smoker     stopped at age 30    GERD (gastroesophageal reflux disease)     managed with PRN medications    GI bleed     from Plavix    History of depression     Hypercholesterolemia     Hypertension     controlled w/med    IBS (irritable bowel syndrome)     manged with medications    Macular degeneration     managed with medications    Old MI (myocardial infarction)     x2; last 9/2014    Osteoarthritis     in shoulders and back    PUD (peptic ulcer disease) 3/2011    no recent episodes    Restless leg syndrome     manged with medications    Seasonal allergic rhinitis     PRN medications    Unspecified sleep apnea     wears cpap       PSH:  Past Surgical History:   Procedure Laterality Date    HX CATARACT REMOVAL Bilateral     HX COLONOSCOPY      HX HEART CATHETERIZATION  2010, 3/2011,4/2011    (1)stent 2010     HX HEART CATHETERIZATION  9/2014    no stent required    HX SHOULDER ARTHROSCOPY Right 2010 approx    HX SHOULDER REPLACEMENT Right 2015    HX TONSIL AND ADENOIDECTOMY      as a child    HX TURP  05/15; 07/15       Allergies: Allergies   Allergen Reactions    Augmentin [Amoxicillin-Pot Clavulanate] Nausea and Vomiting     Severe abd cramping  Severe abd cramping    Oxycodone Unknown (comments)     Sever headaches, abdominal issues       Home Medications:  Prior to Admission medications    Medication Sig Start Date End Date Taking? Authorizing Provider   verapamil ER (CALAN-SR) 240 mg CR tablet Take 1 Tab by mouth daily. Indications: hypertension 7/11/17  Yes Tj Kearney MD   metoprolol succinate (TOPROL-XL) 50 mg XL tablet Take 1 tablet by mouth once daily  Indications: hypertension 7/11/17  Yes Tj Kearney MD   simvastatin (ZOCOR) 20 mg tablet Take 1 Tab by mouth nightly. Indications: mixed hyperlipidemia 7/11/17  Yes Tj Kearney MD   isosorbide mononitrate ER (IMDUR) 30 mg tablet Take 1 tablet by mouth once daily  Indications: CHRONIC STABLE ANGINA PECTORIS 7/11/17  Yes Tj Kearney MD   lisinopril (PRINIVIL, ZESTRIL) 5 mg tablet Take 1 Tab by mouth daily. Indications: hypertension 7/11/17  Yes Tj Kearney MD   pramipexole (MIRAPEX) 0.5 mg tablet Take 1 Tab by mouth nightly. Indications: Restless Legs Syndrome 7/11/17  Yes Tj Kearney MD   amitriptyline (ELAVIL) 10 mg tablet TAKE 1 TABLET ONCE DAILY   FOR GASTROINTESTINAL UPSET PREVENTION/IRRITABLE BOWEL SYNDROME 6/19/17  Yes Tj Kearney MD   cholecalciferol (VITAMIN D3) 1,000 unit cap Take 1,000 Units by mouth daily. Yes Historical Provider   ranitidine (ZANTAC) 150 mg tablet Take 150 mg by mouth daily as needed for Indigestion.  Indications: GASTROESOPHAGEAL REFLUX   Yes Historical Provider   acetaminophen (TYLENOL ARTHRITIS PAIN) 650 mg CR tablet Take 650 mg by mouth every six (6) hours as needed for Pain. Take / use AM day of surgery  per anesthesia protocols if needed   Yes Historical Provider   aspirin delayed-release 81 mg tablet Take 81 mg by mouth daily. Take / use AM day of surgery  per anesthesia protocols. Yes Historical Provider   fexofenadine-pseudoephedrine (ALLEGRA-D 12 HOUR)  mg per tablet Take 1 Tab by mouth daily as needed. Indications: Allergic Rhinitis   Yes Historical Provider   cpap machine kit by Does Not Apply route. Historical Provider   valACYclovir (VALTREX) 1 gram tablet Take 1 Tab by mouth three (3) times daily. 1/16/18   Slama Monet MD   clotrimazole-betamethasone (LOTRISONE) topical cream Apply  to affected area two (2) times a day. 1/12/18   Salma Monet MD   HYDROcodone-acetaminophen [facility]) 5-325 mg per tablet Take 0.5-1 Tabs by mouth every four (4) hours as needed for Pain. Max Daily Amount: 6 Tabs. 12/1/17   Morgan Kahn MD   VIT C/E/ZN/COPPR/LUTEIN/ZEAXAN (PRESERVISION AREDS 2 PO) Take 2 Caps by mouth daily. Historical Provider   nitroglycerin (NITROSTAT) 0.4 mg SL tablet 1 Tab by SubLINGual route every five (5) minutes as needed for Chest Pain (Last reported use was 4/12/15 approx). Patient taking differently: 0.4 mg by SubLINGual route every five (5) minutes as needed for Chest Pain. 8/5/16   Salma Monet MD   omega-3 fatty acids-vitamin e (FISH OIL) 1,000 mg cap Take 2 Caps by mouth every morning.     Historical Provider       Hospital Medications:  Current Facility-Administered Medications   Medication Dose Route Frequency    amitriptyline (ELAVIL) tablet 10 mg  10 mg Oral QHS    isosorbide mononitrate ER (IMDUR) tablet 30 mg  30 mg Oral DAILY    lisinopril (PRINIVIL, ZESTRIL) tablet 5 mg  5 mg Oral DAILY    metoprolol succinate (TOPROL-XL) XL tablet 50 mg  50 mg Oral DAILY    pramipexole (MIRAPEX) tablet 0.5 mg  0.5 mg Oral QHS    simvastatin (ZOCOR) tablet 10 mg  10 mg Oral QHS    verapamil ER (CALAN-SR) tablet 240 mg  240 mg Oral DAILY    sodium chloride (NS) flush 5-10 mL  5-10 mL IntraVENous Q8H    sodium chloride (NS) flush 5-10 mL  5-10 mL IntraVENous PRN    ondansetron (ZOFRAN) injection 4 mg  4 mg IntraVENous Q4H PRN    pantoprazole (PROTONIX) 40 mg in sodium chloride 0.9% 10 mL injection  40 mg IntraVENous Q12H    0.9% sodium chloride infusion  100 mL/hr IntraVENous CONTINUOUS    acetaminophen (TYLENOL) tablet 650 mg  650 mg Oral Q6H PRN    HYDROcodone-acetaminophen (NORCO) 5-325 mg per tablet 0.5 Tab  0.5 Tab Oral Q4H PRN    Or    HYDROcodone-acetaminophen (NORCO) 5-325 mg per tablet 1 Tab  1 Tab Oral Q4H PRN       Social History:  Social History   Substance Use Topics    Smoking status: Former Smoker     Packs/day: 0.00     Years: 0.00    Smokeless tobacco: Never Used      Comment: quit smoking pipe 1960s--- no cigs    Alcohol use No     No history of drug, alcohol or tobacco abuse. Family History:  Family History   Problem Relation Age of Onset    Heart Disease Mother     High Cholesterol Mother     Hypertension Mother     Coronary Artery Disease Mother     Cancer Father      lung cancer--    Heart Disease Brother     Hypertension Brother      No reported history of GI malignancies in first degree relatives. Review of Systems:  A detailed 10 system ROS is obtained, with pertinent positives as listed above. All others are negative. Objective:     Physical Exam:  Vitals:  Visit Vitals    /58    Pulse 73    Temp 98.3 °F (36.8 °C)    Resp 18    Ht 5' 8\" (1.727 m)    Wt 77.1 kg (170 lb)    SpO2 96%    BMI 25.85 kg/m2     Gen:  Pt is alert, cooperative, no acute distress  Skin:  Extremities and face reveal no rashes. No londono erythema. No telangiectasias on the chest wall. HEENT: Sclerae anicteric. Extra-occular muscles are intact. No oral ulcers. No abnormal pigmentation of the lips. The neck is supple. Cardiovascular: Regular rate and rhythm.  No murmurs, gallops, or rubs. Respiratory:  Comfortable breathing with no accessory muscle use. Clear breath sounds anteriorly with no wheezes, rales, or rhonchi. GI:  Abdomen nondistended, soft, and nontender. Normal active bowel sounds. No enlargement of the liver or spleen. No masses palpable. Rectal:  Deferred  Musculoskeletal:  No pitting edema of the lower legs. Extremities have good range of motion. No costovertebral tenderness. Neurological:  Gross memory appears intact. Patient is alert and oriented. Psychiatric:  Mood appears appropriate with judgement intact. Lymphatic:  No cervical or supraclavicular adenopathy. Laboratory:    Recent Labs      05/20/18   0603  05/19/18 [birth year]   WBC   --   10.1  7.9   HGB  7.6*  9.2*  11.0*   HCT  22.9*  27.5*  32.7*   PLT   --   267  273   MCV   --   88.7  88.1   NA  143  141  140   K  4.1  4.2  3.9   CL  109*  106  106   CO2  28  29  27   BUN  23  33*  36*   CREA  0.59*  0.76*  0.72*   CA  8.0*  8.4  8.6   GLU  94  106*  95   AP   --   68  72   SGOT   --   21  33   ALT   --   22  27   TBILI   --   0.5  0.6   PTP   --   14.4  13.5   INR   --   1.2  1.1       Assessment:       Principal Problem:    Upper GI bleed (5/19/2018)    Active Problems:    Hypertension ()      Overview: controlled w/med      Restless leg syndrome ()      Overview: manged with medications      Coronary artery disease involving native coronary artery of native heart without angina pectoris (11/14/2016)      DAVID (obstructive sleep apnea) (11/14/2016)      Acute blood loss anemia (5/19/2018)        Plan:       Transfuse two units today pRBC  Serial Hg  Watch for signs of instability or frequent bloody stools  If stable will plan EGD for tomorrow after transfusions.   PPI BID  2 adequate IV's  MD Jacqueline Osuna MD

## 2018-05-20 NOTE — ED PROVIDER NOTES
HPI Comments: 61-year-old white male with history of coronary artery disease currently on Plavix and aspirin presents with continued black stool and worsening generalized weakness. He was seen in the emergency department yesterday for black stool and noted to be heme positive. Hemoglobin at that time was stable and patient was discharged home with plans to follow up with GI. He has had 3 black bowel movements today. No gross blood. Has had some nausea and generalized weakness and fatigue as well as shortness of breath with exertion. Does report some lower abdominal discomfort which he attributes to his IBS. Reports history of peptic ulcer disease \"long time ago\". He is not currently on proton pump inhibitor. He has continued his aspirin but has not taken Plavix since being seen yesterday. Patient is a 78 y.o. male presenting with abdominal pain. The history is provided by the patient. Abdominal Pain    Associated symptoms include nausea. Pertinent negatives include no fever, no vomiting, no dysuria, no headaches, no chest pain and no back pain. Past Medical History:   Diagnosis Date    Allergic rhinitis     Aortic insufficiency     Aortic sclerosis     no stenosis- ECHO 9/8/14  LVEF 50-55%    BPH with urinary obstruction     resolved with surgery    CAD (coronary artery disease)     stent X1 in 2010. .managed with medications    Chronic pain     right shoulder    Depression     Dyslipidemia     managed with medications    Former pipe smoker     stopped at age 30    GERD (gastroesophageal reflux disease)     managed with PRN medications    GI bleed     from Plavix    History of depression     Hypercholesterolemia     Hypertension     controlled w/med    IBS (irritable bowel syndrome)     manged with medications    Macular degeneration     managed with medications    Old MI (myocardial infarction)     x2; last 9/2014    Osteoarthritis     in shoulders and back    PUD (peptic ulcer disease) 3/2011    no recent episodes    Restless leg syndrome     manged with medications    Seasonal allergic rhinitis     PRN medications    Unspecified sleep apnea     wears cpap       Past Surgical History:   Procedure Laterality Date    HX CATARACT REMOVAL Bilateral     HX COLONOSCOPY      HX HEART CATHETERIZATION  , 3/2011,2011    (1)stent      HX HEART CATHETERIZATION  2014    no stent required    HX SHOULDER ARTHROSCOPY Right  approx    HX SHOULDER REPLACEMENT Right 2015    HX TONSIL AND ADENOIDECTOMY      as a child    HX TURP  05/15; 07/15         Family History:   Problem Relation Age of Onset    Heart Disease Mother     High Cholesterol Mother     Hypertension Mother     Coronary Artery Disease Mother     Cancer Father      lung cancer--    Heart Disease Brother     Hypertension Brother        Social History     Social History    Marital status:      Spouse name: N/A    Number of children: N/A    Years of education: N/A     Occupational History    Not on file. Social History Main Topics    Smoking status: Former Smoker     Packs/day: 0.00     Years: 0.00    Smokeless tobacco: Never Used      Comment: quit smoking pipe 1960s--- no cigs    Alcohol use No    Drug use: No    Sexual activity: Not on file     Other Topics Concern    Not on file     Social History Narrative         ALLERGIES: Augmentin [amoxicillin-pot clavulanate] and Oxycodone    Review of Systems   Constitutional: Negative for fever. HENT: Negative for congestion. Respiratory: Negative for cough and shortness of breath. Cardiovascular: Negative for chest pain. Gastrointestinal: Positive for abdominal pain, blood in stool and nausea. Negative for vomiting. Genitourinary: Negative for dysuria. Musculoskeletal: Negative for back pain and neck pain. Skin: Negative for rash. Neurological: Negative for headaches.        Vitals:    18 1939   BP: 123/68   Pulse: 92   Resp: 18   Temp: 98.5 °F (36.9 °C)   SpO2: 97%   Weight: 77.1 kg (170 lb)   Height: 5' 8\" (1.727 m)            Physical Exam   Constitutional: He is oriented to person, place, and time. He appears well-developed and well-nourished. No distress. HENT:   Head: Normocephalic and atraumatic. Mouth/Throat: Oropharynx is clear and moist.   Eyes: Conjunctivae are normal. Pupils are equal, round, and reactive to light. Neck: Normal range of motion. Neck supple. Cardiovascular: Normal rate and regular rhythm. No murmur heard. Pulmonary/Chest: Effort normal and breath sounds normal.   Abdominal: Soft. There is tenderness in the suprapubic area. There is no rigidity, no rebound, no guarding and no CVA tenderness. Genitourinary:   Genitourinary Comments: Hemoccult not repeated at this time as it was noted to be melenic and heme positive yesterday. Musculoskeletal: Normal range of motion. He exhibits no edema or tenderness. Neurological: He is alert and oriented to person, place, and time. Skin: Skin is warm and dry. Psychiatric: He has a normal mood and affect. Nursing note and vitals reviewed. MDM  Number of Diagnoses or Management Options  Diagnosis management comments: Hemoglobin has dropped from 11 to 9.2. bbasic panel still has mildly elevated BUN. Coags and troponin normal.  EKG obtained secondary to generalized weakness is normal.  Chest x-ray also normal without signs of free air. Hospitalist consulted for admission as the patient appears to have continued GI bleeding and is now becoming symptomatic from the anemia.        Amount and/or Complexity of Data Reviewed  Clinical lab tests: ordered and reviewed  Tests in the medicine section of CPT®: ordered and reviewed  Discuss the patient with other providers: yes  Independent visualization of images, tracings, or specimens: yes    Risk of Complications, Morbidity, and/or Mortality  Presenting problems: moderate  Diagnostic procedures: moderate  Management options: moderate          ED Course       Procedures

## 2018-05-20 NOTE — PROGRESS NOTES
Primary Nurse Yolande Boss RN and Shiv Ramirez RN performed a dual skin assessment on this patient No impairment noted  Dick score is 23

## 2018-05-20 NOTE — PROGRESS NOTES
Per Dr. Victorina Linn, Pt's blood transfusion stopped d/t Temp recheck of 101.1, and to be restarted in 1 hour after giving Tylenol 650 mg  Tylenol 650 mg given per MAR. Will continue to monitor and report to oncoming night shift nurse.

## 2018-05-20 NOTE — PROGRESS NOTES
Hourly rounds completed throughout this shift. Pt reported having a small BM this shift. Pt resting in bed; denies needs at this time. Will continue to monitor and report to oncoming night shift nurse.

## 2018-05-20 NOTE — PROGRESS NOTES
Pt called and reported feeling cold and jerking. Nurse came to the room immediately, pt appeared fine and stated he fell asleep and woke up feeling very cold and jerking and he got up and turned the heat in the room up. Pt is receiving the second unit of blood as ordered. Pt states he is starting to feel better but feels like somebody is using sandpaper on his tongue. Nurse assessed pt's tongue and appears fine. VS checked with B/P 135/54, HR 85, Temp 98.3, oxygen 93% on RA. Pt denies pain or any SOB. Pt states breathing feels no different than it has been in the past 3 days. Pt reports he's feeling much better and calming down. Nurse spent approximately 20 minutes in pt's room monitoring pt without anything abnormal noted. Dr Antonio Eldridge notified. Per Dr. Antonio Eldridge, continue to monitor pt. Will continue to monitor and report to oncoming night shift nurse.

## 2018-05-20 NOTE — PROGRESS NOTES
TRANSFER - IN REPORT:    Verbal report received from Carmencita. RN on Rakel De Oliveira  being received from ED) for routine progression of care      Report consisted of patients Situation, Background, Assessment and   Recommendations(SBAR). Information from the following report(s) Kardex was reviewed with the receiving nurse. Opportunity for questions and clarification was provided. Assessment completed upon patients arrival to unit and care assumed.

## 2018-05-20 NOTE — PROGRESS NOTES
HOSPITALIST Progress Notes      NAME:  Trudi Salinas   Age:  78 y.o.  :   1940   MRN:   [id number]    PCP: Mgada Manning MD    Attending MD: Aaron Márquez MD    Treatment Team: Attending Provider: Aleida Garza MD; Consulting Provider: Erasmo Weinstein MD; Utilization Review: Kaleb Boyle RN    HPI:   As previously documented:\"  Trudi Salinas is a 28YKM with CAD on ASA and plavix, HTN, HLD who presented to [facility] ED with 2 days of melenic stool. He has had 4 dark, tarry BMs today. He has had some worsening weakness and TIDWELL today as well. Also with mild nausea, lower quadrant abd pain and mild epigastric discomfort. No hematemesis, hematochezia. Had a GIB from PUD about 10yrs ago. Not on PPI now. Very rarely takes NSAIDs, had 1 aleve last week. No ETOH. In the ED, rectal exam is heme positive. Hgb has dropped from 11 to 9.2 in last 24hrs. Hospitalist asked to admit for symptomatic anemia and UGIB. \"      2018- pt seen - complains of being hungry has not noted any bleeding- Hb dropped to 7.6 - confirmed on repeat testing- awaiting blood transfusion     Complete ROS done and is as stated in HPI or otherwise negative    Past Medical History:   Diagnosis Date    Allergic rhinitis     Aortic insufficiency     Aortic sclerosis     no stenosis- ECHO 14  LVEF 50-55%    BPH with urinary obstruction     resolved with surgery    CAD (coronary artery disease)     stent X1 in . .managed with medications    Chronic pain     right shoulder    Depression     Dyslipidemia     managed with medications    Former pipe smoker     stopped at age 30    GERD (gastroesophageal reflux disease)     managed with PRN medications    GI bleed     from Plavix    History of depression     Hypercholesterolemia     Hypertension     controlled w/med    IBS (irritable bowel syndrome)     manged with medications    Macular degeneration     managed with medications    Old MI (myocardial infarction)     x2; last 9/2014    Osteoarthritis     in shoulders and back    PUD (peptic ulcer disease) 3/2011    no recent episodes    Restless leg syndrome     manged with medications    Seasonal allergic rhinitis     PRN medications    Unspecified sleep apnea     wears cpap        Past Surgical History:   Procedure Laterality Date    HX CATARACT REMOVAL Bilateral     HX COLONOSCOPY      HX HEART CATHETERIZATION  2010, 3/2011,4/2011    (1)stent 2010     HX HEART CATHETERIZATION  9/2014    no stent required    HX SHOULDER ARTHROSCOPY Right 2010 approx    HX SHOULDER REPLACEMENT Right 2015    HX TONSIL AND ADENOIDECTOMY      as a child    HX TURP  05/15; 07/15        Prior to Admission Medications   Prescriptions Last Dose Informant Patient Reported? Taking? HYDROcodone-acetaminophen (NORCO) 5-325 mg per tablet   No No   Sig: Take 0.5-1 Tabs by mouth every four (4) hours as needed for Pain. Max Daily Amount: 6 Tabs. VIT C/E/ZN/COPPR/LUTEIN/ZEAXAN (PRESERVISION AREDS 2 PO)   Yes No   Sig: Take 2 Caps by mouth daily. acetaminophen (TYLENOL ARTHRITIS PAIN) 650 mg CR tablet   Yes No   Sig: Take 650 mg by mouth every six (6) hours as needed for Pain. Take / use AM day of surgery  per anesthesia protocols if needed   amitriptyline (ELAVIL) 10 mg tablet   No No   Sig: TAKE 1 TABLET ONCE DAILY   FOR GASTROINTESTINAL UPSET PREVENTION/IRRITABLE BOWEL SYNDROME   aspirin delayed-release 81 mg tablet   Yes No   Sig: Take 81 mg by mouth daily. Take / use AM day of surgery  per anesthesia protocols. cholecalciferol (VITAMIN D3) 1,000 unit cap   Yes No   Sig: Take 1,000 Units by mouth daily. clotrimazole-betamethasone (LOTRISONE) topical cream   No No   Sig: Apply  to affected area two (2) times a day. cpap machine kit   Yes No   Sig: by Does Not Apply route. fexofenadine-pseudoephedrine (ALLEGRA-D 12 HOUR)  mg per tablet   Yes No   Sig: Take 1 Tab by mouth daily as needed. Indications:  Allergic Rhinitis   isosorbide mononitrate ER (IMDUR) 30 mg tablet   No No   Sig: Take 1 tablet by mouth once daily  Indications: CHRONIC STABLE ANGINA PECTORIS   lisinopril (PRINIVIL, ZESTRIL) 5 mg tablet   No No   Sig: Take 1 Tab by mouth daily. Indications: hypertension   metoprolol succinate (TOPROL-XL) 50 mg XL tablet   No No   Sig: Take 1 tablet by mouth once daily  Indications: hypertension   nitroglycerin (NITROSTAT) 0.4 mg SL tablet   No No   Si Tab by SubLINGual route every five (5) minutes as needed for Chest Pain (Last reported use was 4/12/15 approx). Patient taking differently: 0.4 mg by SubLINGual route every five (5) minutes as needed for Chest Pain. omega-3 fatty acids-vitamin e (FISH OIL) 1,000 mg cap   Yes No   Sig: Take 2 Caps by mouth every morning. pramipexole (MIRAPEX) 0.5 mg tablet   No No   Sig: Take 1 Tab by mouth nightly. Indications: Restless Legs Syndrome   ranitidine (ZANTAC) 150 mg tablet   Yes No   Sig: Take 150 mg by mouth daily as needed for Indigestion. Indications: GASTROESOPHAGEAL REFLUX   simvastatin (ZOCOR) 20 mg tablet   No No   Sig: Take 1 Tab by mouth nightly. Indications: mixed hyperlipidemia   valACYclovir (VALTREX) 1 gram tablet   No No   Sig: Take 1 Tab by mouth three (3) times daily. verapamil ER (CALAN-SR) 240 mg CR tablet   No No   Sig: Take 1 Tab by mouth daily.  Indications: hypertension      Facility-Administered Medications: None       Allergies   Allergen Reactions    Augmentin [Amoxicillin-Pot Clavulanate] Nausea and Vomiting     Severe abd cramping  Severe abd cramping    Oxycodone Unknown (comments)     Sever headaches, abdominal issues        Social History   Substance Use Topics    Smoking status: Former Smoker     Packs/day: 0.00     Years: 0.00    Smokeless tobacco: Never Used      Comment: quit smoking pipe 1960s--- no cigs    Alcohol use No        Family History   Problem Relation Age of Onset    Heart Disease Mother     High Cholesterol Mother  Hypertension Mother     Coronary Artery Disease Mother     Cancer Father      lung cancer--    Heart Disease Brother     Hypertension Brother         Objective:       Visit Vitals    /65    Pulse 79    Temp 98 °F (36.7 °C)    Resp 18    Ht 5' 8\" (1.727 m)    Wt 77.1 kg (170 lb)    SpO2 98%    BMI 25.85 kg/m2        Temp (24hrs), Av.4 °F (36.9 °C), Min:98 °F (36.7 °C), Max:98.5 °F (36.9 °C)      Oxygen Therapy  O2 Sat (%): 98 % (18 1128)  Pulse via Oximetry: 75 beats per minute (18)  O2 Device: Room air (18)      Physical Exam:      General:    Alert, cooperative, NAD  Eyes:   PERRL EOMI Anicteric  Head:   Normocephalic, without obvious abnormality, atraumatic. ENT:  Nares normal. MMM  Lungs:   Clear to auscultation bilaterally. No Wheezing  Heart:   Regular rate and rhythm,  no BLE edema  Abdomen:   Soft, mild epigastric tenderness, +NABS  MSK:  Spontaneously moves extremities. No deformities/lesions. Skin:     Texture, turgor normal. Not Jaundiced. Neurologic: No focal deficits   Psychiatry:      No depression/anxiety. Mood congruent for context. Heme/Lymph/Immune: No petechiae, echymoses, overt signs of bleeding or lymphadenopathy. Data Review:   Recent Results (from the past 24 hour(s))   CBC WITH AUTOMATED DIFF    Collection Time: 18  7:48 PM   Result Value Ref Range    WBC 10.1 4.3 - 11.1 K/uL    RBC 3.10 (L) 4.23 - 5.67 M/uL    HGB 9.2 (L) 13.6 - 17.2 g/dL    HCT 27.5 (L) 41.1 - 50.3 %    MCV 88.7 79.6 - 97.8 FL    MCH 29.7 26.1 - 32.9 PG    MCHC 33.5 31.4 - 35.0 g/dL    RDW 13.6 11.9 - 14.6 %    PLATELET 659 572 - 978 K/uL    MPV 9.9 (L) 10.8 - 14.1 FL    DF AUTOMATED      NEUTROPHILS 68 43 - 78 %    LYMPHOCYTES 25 13 - 44 %    MONOCYTES 6 4.0 - 12.0 %    EOSINOPHILS 1 0.5 - 7.8 %    BASOPHILS 0 0.0 - 2.0 %    IMMATURE GRANULOCYTES 0 0.0 - 5.0 %    ABS. NEUTROPHILS 6.9 1.7 - 8.2 K/UL    ABS. LYMPHOCYTES 2.5 0.5 - 4.6 K/UL    ABS. MONOCYTES 0.6 0.1 - 1.3 K/UL    ABS. EOSINOPHILS 0.1 0.0 - 0.8 K/UL    ABS. BASOPHILS 0.0 0.0 - 0.2 K/UL    ABS. IMM. GRANS. 0.0 0.0 - 0.5 K/UL   METABOLIC PANEL, COMPREHENSIVE    Collection Time: 05/19/18  7:48 PM   Result Value Ref Range    Sodium 141 136 - 145 mmol/L    Potassium 4.2 3.5 - 5.1 mmol/L    Chloride 106 98 - 107 mmol/L    CO2 29 21 - 32 mmol/L    Anion gap 6 (L) 7 - 16 mmol/L    Glucose 106 (H) 65 - 100 mg/dL    BUN 33 (H) 8 - 23 MG/DL    Creatinine 0.76 (L) 0.8 - 1.5 MG/DL    GFR est AA >60 >60 ml/min/1.73m2    GFR est non-AA >60 >60 ml/min/1.73m2    Calcium 8.4 8.3 - 10.4 MG/DL    Bilirubin, total 0.5 0.2 - 1.1 MG/DL    ALT (SGPT) 22 12 - 65 U/L    AST (SGOT) 21 15 - 37 U/L    Alk.  phosphatase 68 50 - 136 U/L    Protein, total 6.1 (L) 6.3 - 8.2 g/dL    Albumin 3.1 (L) 3.2 - 4.6 g/dL    Globulin 3.0 2.3 - 3.5 g/dL    A-G Ratio 1.0 (L) 1.2 - 3.5     TROPONIN I    Collection Time: 05/19/18  7:48 PM   Result Value Ref Range    Troponin-I, Qt. 0.03 0.02 - 0.05 NG/ML   TYPE & SCREEN    Collection Time: 05/19/18  7:48 PM   Result Value Ref Range    Crossmatch Expiration 05/22/2018     ABO/Rh(D) O POSITIVE     Antibody screen NEG     Comment KEEP AHEAD 2     Unit number [id number]     Blood component type  LR     Unit division 00     Status of unit ALLOCATED     Crossmatch result Compatible     Unit number [id number]     Blood component type  LR     Unit division 00     Status of unit ALLOCATED     Crossmatch result Compatible     Unit number [id number]     Blood component type  LR     Unit division 00     Status of unit ALLOCATED     Crossmatch result Compatible     Unit number [id number]     Blood component type  LR     Unit division 00     Status of unit ALLOCATED     Crossmatch result Compatible    PROTHROMBIN TIME + INR    Collection Time: 05/19/18  7:48 PM   Result Value Ref Range    Prothrombin time 14.4 11.5 - 14.5 sec    INR 1.2     EKG, 12 LEAD, INITIAL    Collection Time: 05/19/18  8:34 PM   Result Value Ref Range    Ventricular Rate 76 BPM    Atrial Rate 75 BPM    P-R Interval 194 ms    QRS Duration 102 ms    Q-T Interval 386 ms    QTC Calculation (Bezet) 434 ms    Calculated P Axis 60 degrees    Calculated R Axis -5 degrees    Calculated T Axis 68 degrees    Diagnosis       !! AGE AND GENDER SPECIFIC ECG ANALYSIS !! Normal sinus rhythm  Septal infarct , age undetermined  Abnormal ECG  When compared with ECG of 12-MAY-2015 16:58,  No significant change was found     METABOLIC PANEL, BASIC    Collection Time: 05/20/18  6:03 AM   Result Value Ref Range    Sodium 143 136 - 145 mmol/L    Potassium 4.1 3.5 - 5.1 mmol/L    Chloride 109 (H) 98 - 107 mmol/L    CO2 28 21 - 32 mmol/L    Anion gap 6 (L) 7 - 16 mmol/L    Glucose 94 65 - 100 mg/dL    BUN 23 8 - 23 MG/DL    Creatinine 0.59 (L) 0.8 - 1.5 MG/DL    GFR est AA >60 >60 ml/min/1.73m2    GFR est non-AA >60 >60 ml/min/1.73m2    Calcium 8.0 (L) 8.3 - 10.4 MG/DL   HGB & HCT    Collection Time: 05/20/18  6:03 AM   Result Value Ref Range    HGB 7.6 (L) 13.6 - 17.2 g/dL    HCT 22.9 (L) 41.1 - 50.3 %   HGB & HCT    Collection Time: 05/20/18 10:51 AM   Result Value Ref Range    HGB 7.6 (L) 13.6 - 17.2 g/dL    HCT 22.8 (L) 41.1 - 50.3 %       Imaging /Procedures /Studies   XR CHEST PA LAT   Final Result   IMPRESSION: Minimal linear bibasilar atelectasis. Markedly elevated right hemidiaphragm. Assessment and Plan:        Active Hospital Problems    Diagnosis Date Noted    Upper GI bleed 05/19/2018    Acute blood loss anemia 05/19/2018    Coronary artery disease involving native coronary artery of native heart without angina pectoris 11/14/2016    DAVID (obstructive sleep apnea) 11/14/2016    Restless leg syndrome      manged with medications      Hypertension      controlled w/med         PLAN  - UGIB/ABLA:  Continue PPI IV BID  Transfuse then HB q12h  GI consult appreciated- for egd ellis  Clear liquids, NPO after midnight    - CAD:  Continue Holding ASA and plavix   Continue BB, imdur    - HTN:  Continue verapamil, metop, imdur and lisinopril    - RLS:  Continue mirapex    - DAVID:   Continue home CPAP    Code Status: FULL  DVT Prophylaxis: SCDs    Anticipated discharge: 2-3 days      Mariah Sierra MD  8:55 PM

## 2018-05-20 NOTE — PROGRESS NOTES
Full code with no directives on file  Nondenominational ervin  Spouse - Myriam Mcdonald, staff Nelly phipps 39, [address]  /   [email or web address]

## 2018-05-20 NOTE — PROGRESS NOTES
Hourly rounds complete this shift, no new complaints at this time,  Patient NPO  bed in low, locked position, call light and bedside table within reach,  all needs met. Will continue to monitor Report to day shift nurse.

## 2018-05-21 ENCOUNTER — ANESTHESIA (OUTPATIENT)
Dept: ENDOSCOPY | Age: 78
DRG: 378 | End: 2018-05-21
Payer: MEDICARE

## 2018-05-21 ENCOUNTER — ANESTHESIA EVENT (OUTPATIENT)
Dept: ENDOSCOPY | Age: 78
DRG: 378 | End: 2018-05-21
Payer: MEDICARE

## 2018-05-21 LAB
ATRIAL RATE: 75 BPM
BASOPHILS # BLD: 0 K/UL (ref 0–0.2)
BASOPHILS NFR BLD: 1 % (ref 0–2)
CALCULATED P AXIS, ECG09: 60 DEGREES
CALCULATED R AXIS, ECG10: -5 DEGREES
CALCULATED T AXIS, ECG11: 68 DEGREES
DIAGNOSIS, 93000: NORMAL
DIFFERENTIAL METHOD BLD: ABNORMAL
EOSINOPHIL # BLD: 0.2 K/UL (ref 0–0.8)
EOSINOPHIL NFR BLD: 3 % (ref 0.5–7.8)
ERYTHROCYTE [DISTWIDTH] IN BLOOD BY AUTOMATED COUNT: 14.5 % (ref 11.9–14.6)
HCT VFR BLD AUTO: 28.2 % (ref 41.1–50.3)
HGB BLD-MCNC: 9.5 G/DL (ref 13.6–17.2)
IMM GRANULOCYTES # BLD: 0 K/UL (ref 0–0.5)
IMM GRANULOCYTES NFR BLD AUTO: 0 % (ref 0–5)
LYMPHOCYTES # BLD: 2.3 K/UL (ref 0.5–4.6)
LYMPHOCYTES NFR BLD: 35 % (ref 13–44)
MCH RBC QN AUTO: 30.2 PG (ref 26.1–32.9)
MCHC RBC AUTO-ENTMCNC: 33.7 G/DL (ref 31.4–35)
MCV RBC AUTO: 89.5 FL (ref 79.6–97.8)
MONOCYTES # BLD: 0.1 K/UL (ref 0.1–1.3)
MONOCYTES NFR BLD: 1 % (ref 4–12)
NEUTS SEG # BLD: 3.9 K/UL (ref 1.7–8.2)
NEUTS SEG NFR BLD: 60 % (ref 43–78)
P-R INTERVAL, ECG05: 194 MS
PLATELET # BLD AUTO: 206 K/UL (ref 150–450)
PMV BLD AUTO: 9.9 FL (ref 10.8–14.1)
Q-T INTERVAL, ECG07: 386 MS
QRS DURATION, ECG06: 102 MS
QTC CALCULATION (BEZET), ECG08: 434 MS
RBC # BLD AUTO: 3.15 M/UL (ref 4.23–5.67)
VENTRICULAR RATE, ECG03: 76 BPM
WBC # BLD AUTO: 6.5 K/UL (ref 4.3–11.1)

## 2018-05-21 PROCEDURE — 88305 TISSUE EXAM BY PATHOLOGIST: CPT | Performed by: INTERNAL MEDICINE

## 2018-05-21 PROCEDURE — 74011250636 HC RX REV CODE- 250/636: Performed by: ANESTHESIOLOGY

## 2018-05-21 PROCEDURE — 85025 COMPLETE CBC W/AUTO DIFF WBC: CPT | Performed by: INTERNAL MEDICINE

## 2018-05-21 PROCEDURE — 74011250637 HC RX REV CODE- 250/637: Performed by: INTERNAL MEDICINE

## 2018-05-21 PROCEDURE — 74011000250 HC RX REV CODE- 250: Performed by: INTERNAL MEDICINE

## 2018-05-21 PROCEDURE — 77030009426 HC FCPS BIOP ENDOSC BSC -B: Performed by: INTERNAL MEDICINE

## 2018-05-21 PROCEDURE — 36415 COLL VENOUS BLD VENIPUNCTURE: CPT | Performed by: INTERNAL MEDICINE

## 2018-05-21 PROCEDURE — 76060000031 HC ANESTHESIA FIRST 0.5 HR: Performed by: INTERNAL MEDICINE

## 2018-05-21 PROCEDURE — 65270000029 HC RM PRIVATE

## 2018-05-21 PROCEDURE — 0DB78ZX EXCISION OF STOMACH, PYLORUS, VIA NATURAL OR ARTIFICIAL OPENING ENDOSCOPIC, DIAGNOSTIC: ICD-10-PCS | Performed by: INTERNAL MEDICINE

## 2018-05-21 PROCEDURE — 88312 SPECIAL STAINS GROUP 1: CPT | Performed by: INTERNAL MEDICINE

## 2018-05-21 PROCEDURE — 76040000025: Performed by: INTERNAL MEDICINE

## 2018-05-21 PROCEDURE — C9113 INJ PANTOPRAZOLE SODIUM, VIA: HCPCS | Performed by: INTERNAL MEDICINE

## 2018-05-21 PROCEDURE — 74011250636 HC RX REV CODE- 250/636

## 2018-05-21 PROCEDURE — 74011250636 HC RX REV CODE- 250/636: Performed by: INTERNAL MEDICINE

## 2018-05-21 RX ORDER — SODIUM CHLORIDE, SODIUM LACTATE, POTASSIUM CHLORIDE, CALCIUM CHLORIDE 600; 310; 30; 20 MG/100ML; MG/100ML; MG/100ML; MG/100ML
100 INJECTION, SOLUTION INTRAVENOUS CONTINUOUS
Status: DISCONTINUED | OUTPATIENT
Start: 2018-05-21 | End: 2018-05-22 | Stop reason: HOSPADM

## 2018-05-21 RX ORDER — SODIUM CHLORIDE 9 MG/ML
250 INJECTION, SOLUTION INTRAVENOUS AS NEEDED
Status: DISCONTINUED | OUTPATIENT
Start: 2018-05-21 | End: 2018-05-22 | Stop reason: HOSPADM

## 2018-05-21 RX ORDER — PROPOFOL 10 MG/ML
INJECTION, EMULSION INTRAVENOUS AS NEEDED
Status: DISCONTINUED | OUTPATIENT
Start: 2018-05-21 | End: 2018-05-21 | Stop reason: HOSPADM

## 2018-05-21 RX ORDER — SODIUM CHLORIDE 0.9 % (FLUSH) 0.9 %
5-10 SYRINGE (ML) INJECTION AS NEEDED
Status: DISCONTINUED | OUTPATIENT
Start: 2018-05-21 | End: 2018-05-22 | Stop reason: HOSPADM

## 2018-05-21 RX ADMIN — SODIUM CHLORIDE 10 ML: 9 INJECTION, SOLUTION INTRAMUSCULAR; INTRAVENOUS; SUBCUTANEOUS at 06:00

## 2018-05-21 RX ADMIN — ACETAMINOPHEN 650 MG: 325 TABLET ORAL at 17:20

## 2018-05-21 RX ADMIN — PROPOFOL 20 MG: 10 INJECTION, EMULSION INTRAVENOUS at 10:51

## 2018-05-21 RX ADMIN — VERAPAMIL HYDROCHLORIDE 240 MG: 240 TABLET, FILM COATED, EXTENDED RELEASE ORAL at 08:21

## 2018-05-21 RX ADMIN — SODIUM CHLORIDE 10 ML: 9 INJECTION, SOLUTION INTRAMUSCULAR; INTRAVENOUS; SUBCUTANEOUS at 21:36

## 2018-05-21 RX ADMIN — METOPROLOL SUCCINATE 50 MG: 50 TABLET, EXTENDED RELEASE ORAL at 08:20

## 2018-05-21 RX ADMIN — AMITRIPTYLINE HYDROCHLORIDE 10 MG: 10 TABLET, FILM COATED ORAL at 21:36

## 2018-05-21 RX ADMIN — PRAMIPEXOLE DIHYDROCHLORIDE 0.5 MG: 0.25 TABLET ORAL at 21:36

## 2018-05-21 RX ADMIN — ISOSORBIDE MONONITRATE 30 MG: 30 TABLET, EXTENDED RELEASE ORAL at 08:20

## 2018-05-21 RX ADMIN — SODIUM CHLORIDE 40 MG: 9 INJECTION INTRAMUSCULAR; INTRAVENOUS; SUBCUTANEOUS at 08:19

## 2018-05-21 RX ADMIN — SODIUM CHLORIDE 40 MG: 9 INJECTION INTRAMUSCULAR; INTRAVENOUS; SUBCUTANEOUS at 20:16

## 2018-05-21 RX ADMIN — SODIUM CHLORIDE 100 ML/HR: 900 INJECTION, SOLUTION INTRAVENOUS at 12:37

## 2018-05-21 RX ADMIN — SODIUM CHLORIDE 100 ML/HR: 900 INJECTION, SOLUTION INTRAVENOUS at 22:20

## 2018-05-21 RX ADMIN — SODIUM CHLORIDE, SODIUM LACTATE, POTASSIUM CHLORIDE, AND CALCIUM CHLORIDE: 600; 310; 30; 20 INJECTION, SOLUTION INTRAVENOUS at 09:16

## 2018-05-21 RX ADMIN — SODIUM CHLORIDE 10 ML: 9 INJECTION, SOLUTION INTRAMUSCULAR; INTRAVENOUS; SUBCUTANEOUS at 14:49

## 2018-05-21 RX ADMIN — PROPOFOL 50 MG: 10 INJECTION, EMULSION INTRAVENOUS at 10:49

## 2018-05-21 RX ADMIN — LISINOPRIL 5 MG: 5 TABLET ORAL at 08:20

## 2018-05-21 RX ADMIN — SODIUM CHLORIDE, SODIUM LACTATE, POTASSIUM CHLORIDE, AND CALCIUM CHLORIDE 100 ML/HR: 600; 310; 30; 20 INJECTION, SOLUTION INTRAVENOUS at 09:47

## 2018-05-21 RX ADMIN — SIMVASTATIN 10 MG: 10 TABLET, FILM COATED ORAL at 21:36

## 2018-05-21 RX ADMIN — ACETAMINOPHEN 650 MG: 325 TABLET ORAL at 08:20

## 2018-05-21 RX ADMIN — HYDROCODONE BITARTRATE AND ACETAMINOPHEN 1 TABLET: 5; 325 TABLET ORAL at 00:26

## 2018-05-21 NOTE — H&P (VIEW-ONLY)
[facility] Consult Note    Referring Physician:  Dr. Reilly Cam Date:  5/20/2018    Admit Date:  5/19/2018    Chief Complaint:  Melena and ABLA    Subjective:     History of Present Illness:  Patient is a 78 y.o. WM with hx of CAD on Plavix and asa, (stent 15 years ago and MI 2014) who is seen in consultation at the request of Dr. Henna López for dark stools. Hg was checked serially last three days and dropped from 11 to 7.6. No stools over night or so far today and no hematemesis. Had PUD many years ago, h.pylori neg and no etiology assigned. Had one Aleve in the past week but no regular NSAIDS and no etoh/tob. No pain, GERD or other symptoms. Plavix held yest and ASA held today. PMH:  Past Medical History:   Diagnosis Date    Allergic rhinitis     Aortic insufficiency     Aortic sclerosis     no stenosis- ECHO 9/8/14  LVEF 50-55%    BPH with urinary obstruction     resolved with surgery    CAD (coronary artery disease)     stent X1 in 2010. .managed with medications    Chronic pain     right shoulder    Depression     Dyslipidemia     managed with medications    Former pipe smoker     stopped at age 30    GERD (gastroesophageal reflux disease)     managed with PRN medications    GI bleed     from Plavix    History of depression     Hypercholesterolemia     Hypertension     controlled w/med    IBS (irritable bowel syndrome)     manged with medications    Macular degeneration     managed with medications    Old MI (myocardial infarction)     x2; last 9/2014    Osteoarthritis     in shoulders and back    PUD (peptic ulcer disease) 3/2011    no recent episodes    Restless leg syndrome     manged with medications    Seasonal allergic rhinitis     PRN medications    Unspecified sleep apnea     wears cpap       PSH:  Past Surgical History:   Procedure Laterality Date    HX CATARACT REMOVAL Bilateral     HX COLONOSCOPY      HX HEART CATHETERIZATION  2010, 3/2011,4/2011    (1)stent 2010     HX HEART CATHETERIZATION  9/2014    no stent required    HX SHOULDER ARTHROSCOPY Right 2010 approx    HX SHOULDER REPLACEMENT Right 2015    HX TONSIL AND ADENOIDECTOMY      as a child    HX TURP  05/15; 07/15       Allergies: Allergies   Allergen Reactions    Augmentin [Amoxicillin-Pot Clavulanate] Nausea and Vomiting     Severe abd cramping  Severe abd cramping    Oxycodone Unknown (comments)     Sever headaches, abdominal issues       Home Medications:  Prior to Admission medications    Medication Sig Start Date End Date Taking? Authorizing Provider   verapamil ER (CALAN-SR) 240 mg CR tablet Take 1 Tab by mouth daily. Indications: hypertension 7/11/17  Yes Juan Chaparro MD   metoprolol succinate (TOPROL-XL) 50 mg XL tablet Take 1 tablet by mouth once daily  Indications: hypertension 7/11/17  Yes Juan Chaparro MD   simvastatin (ZOCOR) 20 mg tablet Take 1 Tab by mouth nightly. Indications: mixed hyperlipidemia 7/11/17  Yes Juan Chaparro MD   isosorbide mononitrate ER (IMDUR) 30 mg tablet Take 1 tablet by mouth once daily  Indications: CHRONIC STABLE ANGINA PECTORIS 7/11/17  Yes Juan Chaparro MD   lisinopril (PRINIVIL, ZESTRIL) 5 mg tablet Take 1 Tab by mouth daily. Indications: hypertension 7/11/17  Yes Juan Chaparro MD   pramipexole (MIRAPEX) 0.5 mg tablet Take 1 Tab by mouth nightly. Indications: Restless Legs Syndrome 7/11/17  Yes Juan Chaparro MD   amitriptyline (ELAVIL) 10 mg tablet TAKE 1 TABLET ONCE DAILY   FOR GASTROINTESTINAL UPSET PREVENTION/IRRITABLE BOWEL SYNDROME 6/19/17  Yes Juan Chaparro MD   cholecalciferol (VITAMIN D3) 1,000 unit cap Take 1,000 Units by mouth daily. Yes Historical Provider   ranitidine (ZANTAC) 150 mg tablet Take 150 mg by mouth daily as needed for Indigestion.  Indications: GASTROESOPHAGEAL REFLUX   Yes Historical Provider   acetaminophen (TYLENOL ARTHRITIS PAIN) 650 mg CR tablet Take 650 mg by mouth every six (6) hours as needed for Pain. Take / use AM day of surgery  per anesthesia protocols if needed   Yes Historical Provider   aspirin delayed-release 81 mg tablet Take 81 mg by mouth daily. Take / use AM day of surgery  per anesthesia protocols. Yes Historical Provider   fexofenadine-pseudoephedrine (ALLEGRA-D 12 HOUR)  mg per tablet Take 1 Tab by mouth daily as needed. Indications: Allergic Rhinitis   Yes Historical Provider   cpap machine kit by Does Not Apply route. Historical Provider   valACYclovir (VALTREX) 1 gram tablet Take 1 Tab by mouth three (3) times daily. 1/16/18   Tj Kearney MD   clotrimazole-betamethasone (LOTRISONE) topical cream Apply  to affected area two (2) times a day. 1/12/18   Tj Kearney MD   HYDROcodone-acetaminophen [facility]) 5-325 mg per tablet Take 0.5-1 Tabs by mouth every four (4) hours as needed for Pain. Max Daily Amount: 6 Tabs. 12/1/17   Mariana Bansal MD   VIT C/E/ZN/COPPR/LUTEIN/ZEAXAN (PRESERVISION AREDS 2 PO) Take 2 Caps by mouth daily. Historical Provider   nitroglycerin (NITROSTAT) 0.4 mg SL tablet 1 Tab by SubLINGual route every five (5) minutes as needed for Chest Pain (Last reported use was 4/12/15 approx). Patient taking differently: 0.4 mg by SubLINGual route every five (5) minutes as needed for Chest Pain. 8/5/16   Tj Kearney MD   omega-3 fatty acids-vitamin e (FISH OIL) 1,000 mg cap Take 2 Caps by mouth every morning.     Historical Provider       Hospital Medications:  Current Facility-Administered Medications   Medication Dose Route Frequency    amitriptyline (ELAVIL) tablet 10 mg  10 mg Oral QHS    isosorbide mononitrate ER (IMDUR) tablet 30 mg  30 mg Oral DAILY    lisinopril (PRINIVIL, ZESTRIL) tablet 5 mg  5 mg Oral DAILY    metoprolol succinate (TOPROL-XL) XL tablet 50 mg  50 mg Oral DAILY    pramipexole (MIRAPEX) tablet 0.5 mg  0.5 mg Oral QHS    simvastatin (ZOCOR) tablet 10 mg  10 mg Oral QHS    verapamil ER (CALAN-SR) tablet 240 mg  240 mg Oral DAILY    sodium chloride (NS) flush 5-10 mL  5-10 mL IntraVENous Q8H    sodium chloride (NS) flush 5-10 mL  5-10 mL IntraVENous PRN    ondansetron (ZOFRAN) injection 4 mg  4 mg IntraVENous Q4H PRN    pantoprazole (PROTONIX) 40 mg in sodium chloride 0.9% 10 mL injection  40 mg IntraVENous Q12H    0.9% sodium chloride infusion  100 mL/hr IntraVENous CONTINUOUS    acetaminophen (TYLENOL) tablet 650 mg  650 mg Oral Q6H PRN    HYDROcodone-acetaminophen (NORCO) 5-325 mg per tablet 0.5 Tab  0.5 Tab Oral Q4H PRN    Or    HYDROcodone-acetaminophen (NORCO) 5-325 mg per tablet 1 Tab  1 Tab Oral Q4H PRN       Social History:  Social History   Substance Use Topics    Smoking status: Former Smoker     Packs/day: 0.00     Years: 0.00    Smokeless tobacco: Never Used      Comment: quit smoking pipe 1960s--- no cigs    Alcohol use No     No history of drug, alcohol or tobacco abuse. Family History:  Family History   Problem Relation Age of Onset    Heart Disease Mother     High Cholesterol Mother     Hypertension Mother     Coronary Artery Disease Mother     Cancer Father      lung cancer--    Heart Disease Brother     Hypertension Brother      No reported history of GI malignancies in first degree relatives. Review of Systems:  A detailed 10 system ROS is obtained, with pertinent positives as listed above. All others are negative. Objective:     Physical Exam:  Vitals:  Visit Vitals    /58    Pulse 73    Temp 98.3 °F (36.8 °C)    Resp 18    Ht 5' 8\" (1.727 m)    Wt 77.1 kg (170 lb)    SpO2 96%    BMI 25.85 kg/m2     Gen:  Pt is alert, cooperative, no acute distress  Skin:  Extremities and face reveal no rashes. No londono erythema. No telangiectasias on the chest wall. HEENT: Sclerae anicteric. Extra-occular muscles are intact. No oral ulcers. No abnormal pigmentation of the lips. The neck is supple. Cardiovascular: Regular rate and rhythm.  No murmurs, gallops, or rubs. Respiratory:  Comfortable breathing with no accessory muscle use. Clear breath sounds anteriorly with no wheezes, rales, or rhonchi. GI:  Abdomen nondistended, soft, and nontender. Normal active bowel sounds. No enlargement of the liver or spleen. No masses palpable. Rectal:  Deferred  Musculoskeletal:  No pitting edema of the lower legs. Extremities have good range of motion. No costovertebral tenderness. Neurological:  Gross memory appears intact. Patient is alert and oriented. Psychiatric:  Mood appears appropriate with judgement intact. Lymphatic:  No cervical or supraclavicular adenopathy. Laboratory:    Recent Labs      05/20/18   0603  05/19/18 [birth year]   WBC   --   10.1  7.9   HGB  7.6*  9.2*  11.0*   HCT  22.9*  27.5*  32.7*   PLT   --   267  273   MCV   --   88.7  88.1   NA  143  141  140   K  4.1  4.2  3.9   CL  109*  106  106   CO2  28  29  27   BUN  23  33*  36*   CREA  0.59*  0.76*  0.72*   CA  8.0*  8.4  8.6   GLU  94  106*  95   AP   --   68  72   SGOT   --   21  33   ALT   --   22  27   TBILI   --   0.5  0.6   PTP   --   14.4  13.5   INR   --   1.2  1.1       Assessment:       Principal Problem:    Upper GI bleed (5/19/2018)    Active Problems:    Hypertension ()      Overview: controlled w/med      Restless leg syndrome ()      Overview: manged with medications      Coronary artery disease involving native coronary artery of native heart without angina pectoris (11/14/2016)      DAVID (obstructive sleep apnea) (11/14/2016)      Acute blood loss anemia (5/19/2018)        Plan:       Transfuse two units today pRBC  Serial Hg  Watch for signs of instability or frequent bloody stools  If stable will plan EGD for tomorrow after transfusions.   PPI BID  2 adequate IV's  MD Lin Torres MD

## 2018-05-21 NOTE — PROGRESS NOTES
Interdisciplinary Rounds completed 05/21/18. Nursing, Case Management, Physician and PT present. Plan of care reviewed and updated. PT OFF Floor for IDT rounds.

## 2018-05-21 NOTE — ANESTHESIA POSTPROCEDURE EVALUATION
Post-Anesthesia Evaluation and Assessment    Patient: Susan Jeong MRN: [id number]  SSN: [id number]    YOB: 1940  Age: 78 y.o. Sex: male       Cardiovascular Function/Vital Signs  Visit Vitals    /58    Pulse 70    Temp 37.1 °C (98.7 °F)    Resp 16    Ht 5' 8\" (1.727 m)    Wt 77.1 kg (170 lb)    SpO2 98%    BMI 25.85 kg/m2       Patient is status post total IV anesthesia anesthesia for Procedure(s):  ESOPHAGOGASTRODUODENOSCOPY (EGD)  ESOPHAGOGASTRODUODENAL (EGD) BIOPSY. Nausea/Vomiting: None    Postoperative hydration reviewed and adequate. Pain:  Pain Scale 1: Numeric (0 - 10) (05/21/18 1116)  Pain Intensity 1: 0 (05/21/18 1116)   Managed    Neurological Status: At baseline    Mental Status and Level of Consciousness: Alert and oriented     Pulmonary Status:   O2 Device: Room air (05/21/18 1116)   Adequate oxygenation and airway patent    Complications related to anesthesia: None    Post-anesthesia assessment completed.  No concerns    Signed By: Guille Lawson MD     May 21, 2018

## 2018-05-21 NOTE — PROGRESS NOTES
HOSPITALIST Progress Notes      NAME:  Phil Schneider   Age:  78 y.o.  :   1940   MRN:   [id number]    PCP: Chaparro Ying MD    Attending MD: Maria Elena Schmitz MD    Treatment Team: Attending Provider: Kamlesh Acosta MD; Consulting Provider: Camille De Paz MD; Utilization Review: Annie Alcantar RN; Utilization Review: Jigar Yee RN    HPI:   As previously documented:\"  Phil Schneider is a 59UNX with CAD on ASA and plavix, HTN, HLD who presented to [facility] ED with 2 days of melenic stool. He has had 4 dark, tarry BMs today. He has had some worsening weakness and TIDWELL today as well. Also with mild nausea, lower quadrant abd pain and mild epigastric discomfort. No hematemesis, hematochezia. Had a GIB from PUD about 10yrs ago. Not on PPI now. Very rarely takes NSAIDs, had 1 aleve last week. No ETOH. In the ED, rectal exam is heme positive. Hgb has dropped from 11 to 9.2 in last 24hrs. Hospitalist asked to admit for symptomatic anemia and UGIB. \"      2018- pt seen - complains of being hungry has not noted any bleeding- Hb dropped to 7.6 - confirmed on repeat testing- awaiting blood transfusion     2018- seen - had a fever last night after second unit of blood was not pre-medicated by Gi prior to transfusion. Got tylenol from on call and rest was uneventful. Hb- last check 9.6 but no repeat from this am although requested. He noted black stools this am.     Complete ROS done and is as stated in HPI or otherwise negative    Past Medical History:   Diagnosis Date    Allergic rhinitis     Aortic insufficiency     Aortic sclerosis     no stenosis- ECHO 14  LVEF 50-55%    BPH with urinary obstruction     resolved with surgery    CAD (coronary artery disease)     stent X1 in . .managed with medications    Chronic pain     right shoulder    Depression     Dyslipidemia     managed with medications    Former pipe smoker     stopped at age 30    GERD (gastroesophageal reflux disease)     managed with PRN medications    GI bleed     from Plavix    History of depression     Hypercholesterolemia     Hypertension     controlled w/med    IBS (irritable bowel syndrome)     manged with medications    Macular degeneration     managed with medications    Old MI (myocardial infarction)     x2; last 9/2014    Osteoarthritis     in shoulders and back    PUD (peptic ulcer disease) 3/2011    no recent episodes    Restless leg syndrome     manged with medications    Seasonal allergic rhinitis     PRN medications    Unspecified sleep apnea     wears cpap        Past Surgical History:   Procedure Laterality Date    HX CATARACT REMOVAL Bilateral     HX COLONOSCOPY      HX HEART CATHETERIZATION  2010, 3/2011,4/2011    (1)stent 2010     HX HEART CATHETERIZATION  9/2014    no stent required    HX SHOULDER ARTHROSCOPY Right 2010 approx    HX SHOULDER REPLACEMENT Right 2015    HX TONSIL AND ADENOIDECTOMY      as a child    HX TURP  05/15; 07/15        Prior to Admission Medications   Prescriptions Last Dose Informant Patient Reported? Taking? HYDROcodone-acetaminophen (NORCO) 5-325 mg per tablet   No No   Sig: Take 0.5-1 Tabs by mouth every four (4) hours as needed for Pain. Max Daily Amount: 6 Tabs. VIT C/E/ZN/COPPR/LUTEIN/ZEAXAN (PRESERVISION AREDS 2 PO)   Yes No   Sig: Take 2 Caps by mouth daily. acetaminophen (TYLENOL ARTHRITIS PAIN) 650 mg CR tablet   Yes No   Sig: Take 650 mg by mouth every six (6) hours as needed for Pain. Take / use AM day of surgery  per anesthesia protocols if needed   amitriptyline (ELAVIL) 10 mg tablet   No No   Sig: TAKE 1 TABLET ONCE DAILY   FOR GASTROINTESTINAL UPSET PREVENTION/IRRITABLE BOWEL SYNDROME   aspirin delayed-release 81 mg tablet   Yes No   Sig: Take 81 mg by mouth daily. Take / use AM day of surgery  per anesthesia protocols. cholecalciferol (VITAMIN D3) 1,000 unit cap   Yes No   Sig: Take 1,000 Units by mouth daily. clotrimazole-betamethasone (LOTRISONE) topical cream   No No   Sig: Apply  to affected area two (2) times a day. cpap machine kit   Yes No   Sig: by Does Not Apply route. fexofenadine-pseudoephedrine (ALLEGRA-D 12 HOUR)  mg per tablet   Yes No   Sig: Take 1 Tab by mouth daily as needed. Indications: Allergic Rhinitis   isosorbide mononitrate ER (IMDUR) 30 mg tablet   No No   Sig: Take 1 tablet by mouth once daily  Indications: CHRONIC STABLE ANGINA PECTORIS   lisinopril (PRINIVIL, ZESTRIL) 5 mg tablet   No No   Sig: Take 1 Tab by mouth daily. Indications: hypertension   metoprolol succinate (TOPROL-XL) 50 mg XL tablet   No No   Sig: Take 1 tablet by mouth once daily  Indications: hypertension   nitroglycerin (NITROSTAT) 0.4 mg SL tablet   No No   Si Tab by SubLINGual route every five (5) minutes as needed for Chest Pain (Last reported use was 4/12/15 approx). Patient taking differently: 0.4 mg by SubLINGual route every five (5) minutes as needed for Chest Pain. omega-3 fatty acids-vitamin e (FISH OIL) 1,000 mg cap   Yes No   Sig: Take 2 Caps by mouth every morning. pramipexole (MIRAPEX) 0.5 mg tablet   No No   Sig: Take 1 Tab by mouth nightly. Indications: Restless Legs Syndrome   ranitidine (ZANTAC) 150 mg tablet   Yes No   Sig: Take 150 mg by mouth daily as needed for Indigestion. Indications: GASTROESOPHAGEAL REFLUX   simvastatin (ZOCOR) 20 mg tablet   No No   Sig: Take 1 Tab by mouth nightly. Indications: mixed hyperlipidemia   valACYclovir (VALTREX) 1 gram tablet   No No   Sig: Take 1 Tab by mouth three (3) times daily. verapamil ER (CALAN-SR) 240 mg CR tablet   No No   Sig: Take 1 Tab by mouth daily.  Indications: hypertension      Facility-Administered Medications: None       Allergies   Allergen Reactions    Augmentin [Amoxicillin-Pot Clavulanate] Nausea and Vomiting     Severe abd cramping  Severe abd cramping    Oxycodone Unknown (comments)     Sever headaches, abdominal issues Social History   Substance Use Topics    Smoking status: Former Smoker     Packs/day: 0.00     Years: 0.00    Smokeless tobacco: Never Used      Comment: quit smoking pipe --- no cigs    Alcohol use No        Family History   Problem Relation Age of Onset    Heart Disease Mother     High Cholesterol Mother     Hypertension Mother     Coronary Artery Disease Mother     Cancer Father      lung cancer--    Heart Disease Brother     Hypertension Brother         Objective:       Visit Vitals    /59 (BP 1 Location: Left arm, BP Patient Position: At rest)    Pulse 75    Temp 98.7 °F (37.1 °C)    Resp 19    Ht 5' 8\" (1.727 m)    Wt 77.1 kg (170 lb)    SpO2 91%    BMI 25.85 kg/m2        Temp (24hrs), Av.8 °F (37.1 °C), Min:98 °F (36.7 °C), Max:101.1 °F (38.4 °C)      Oxygen Therapy  O2 Sat (%): 91 % (18)  Pulse via Oximetry: 75 beats per minute (18)  O2 Device: Room air (18)      Physical Exam:      General:    Alert, cooperative, NAD  Eyes:   PERRL EOMI Anicteric  Head:   Normocephalic, without obvious abnormality, atraumatic. ENT:  Nares normal. MMM  Lungs:   Clear to auscultation bilaterally. No Wheezing  Heart:   Regular rate and rhythm,  no BLE edema  Abdomen:   Soft, mild epigastric tenderness, +NABS  MSK:  Spontaneously moves extremities. No deformities/lesions. Skin:     Texture, turgor normal. Not Jaundiced. Neurologic: No focal deficits   Psychiatry:      No depression/anxiety. Mood congruent for context. Heme/Lymph/Immune: No petechiae, echymoses, overt signs of bleeding or lymphadenopathy.       Data Review:   Recent Results (from the past 24 hour(s))   HGB & HCT    Collection Time: 18 10:51 AM   Result Value Ref Range    HGB 7.6 (L) 13.6 - 17.2 g/dL    HCT 22.8 (L) 41.1 - 50.3 %   HGB & HCT    Collection Time: 18 10:20 PM   Result Value Ref Range    HGB 9.5 (L) 13.6 - 17.2 g/dL    HCT 28.5 (L) 41.1 - 50.3 % Imaging /Procedures /Studies   XR CHEST PA LAT   Final Result   IMPRESSION: Minimal linear bibasilar atelectasis. Markedly elevated right hemidiaphragm. Assessment and Plan:        Active Hospital Problems    Diagnosis Date Noted    Upper GI bleed 05/19/2018    Acute blood loss anemia 05/19/2018    Coronary artery disease involving native coronary artery of native heart without angina pectoris 11/14/2016    DAVID (obstructive sleep apnea) 11/14/2016    Restless leg syndrome      manged with medications      Hypertension      controlled w/med         PLAN  - UGIB/ABLA:  - Continue PPI IV BID  - Transfuse then HB q12h  - GI consult appreciated- for egd today  - NPO for egd    - CAD:  Continue Holding ASA and plavix   Continue BB, imdur    - HTN:  Continue verapamil, metop, imdur and lisinopril    - RLS:  Continue mirapex    - DAVID:   Continue home CPAP    Code Status: FULL  DVT Prophylaxis: SCDs    Anticipated discharge: 2-3 days      Jessica Trevizo MD  8:55 PM

## 2018-05-21 NOTE — PROGRESS NOTES
TRANSFER - OUT REPORT:    Verbal report given to Gillian East on [facility]  being transferred to GI lab for ordered procedure       Report consisted of patients Situation, Background, Assessment and   Recommendations(SBAR). Information from the following report(s) SBAR was reviewed with the receiving nurse. Opportunity for questions and clarification was provided. Patient transported with:  Transport staff.

## 2018-05-21 NOTE — PROGRESS NOTES
TRANSFER - IN REPORT:    Verbal report received from Mary Ann(name) on Starlette Corporal  being received from 615(unit) for routine progression of care      Report consisted of patients Situation, Background, Assessment and   Recommendations(SBAR). Information from the following report(s) Kardex was reviewed with the receiving nurse. Opportunity for questions and clarification was provided. Assessment completed upon patients arrival to unit and care assumed.

## 2018-05-21 NOTE — ANESTHESIA PREPROCEDURE EVALUATION
Anesthetic History   No history of anesthetic complications            Review of Systems / Medical History  Patient summary reviewed and pertinent labs reviewed    Pulmonary        Sleep apnea: CPAP           Neuro/Psych   Within defined limits           Cardiovascular    Hypertension: well controlled          CAD and cardiac stents (stent X1 in 2010)    Exercise tolerance: >4 METS     GI/Hepatic/Renal     GERD: well controlled          Comments: GIB requiring blood transfusion Endo/Other             Other Findings              Physical Exam    Airway  Mallampati: II  TM Distance: 4 - 6 cm  Neck ROM: normal range of motion   Mouth opening: Normal     Cardiovascular  Regular rate and rhythm,  S1 and S2 normal,  no murmur, click, rub, or gallop             Dental  No notable dental hx       Pulmonary  Breath sounds clear to auscultation               Abdominal  GI exam deferred       Other Findings            Anesthetic Plan    ASA: 3, emergent  Anesthesia type: total IV anesthesia          Induction: Intravenous  Anesthetic plan and risks discussed with: Patient

## 2018-05-21 NOTE — PROGRESS NOTES
RN called GI for MD clarification regarding ordered unit of blood. Per GI, repeat hgb at once. Do not need to transfuse unless active bleeding or unless something significant r/t hgb and pt conditions/symptoms. Pt made aware. Hgb=9.5 on 5/21 at 1308. Hgb= 9.5 on 5/20 at 2220.

## 2018-05-21 NOTE — PROGRESS NOTES
hgb recheck showed >9. PM RN updated on unit of blood ordered if needed, eg: leal bleeding, drop in hgb. Serial checks ordered. RN to call GI if any questions. Pt ate from dinner tray denied n/v. Spouse at bedside most of day. Treated with tylenol twice r/t nagging HA. Thank you PM RN.

## 2018-05-21 NOTE — INTERVAL H&P NOTE
H&P Update:  Rakel De Oliveira was seen and examined. History and physical has been reviewed. The patient has been examined.  There have been no significant clinical changes since the completion of the originally dated History and Physical.    Signed By: Mariangel Alfaro MD     May 21, 2018 10:30 AM

## 2018-05-21 NOTE — PROGRESS NOTES
Hourly rounds complete this shift, no new complaints at this time, re-started  blood transfusion, tolerated well, elevated temp resolved. bed in low, locked position, call light and bedside table within reach,  all needs met. Will continue to monitor Report to day shift nurse.

## 2018-05-21 NOTE — PROGRESS NOTES
TRANSFER - IN REPORT:    Verbal report received from [address] on Susan Jeong  being received from recovery for routine progression of care      Report consisted of patients Situation, Background, Assessment and   Recommendations(SBAR). Information from the following report(s) SBAR was reviewed with the receiving nurse. Opportunity for questions and clarification was provided. Assessment completed upon patients arrival to unit and care assumed.

## 2018-05-22 VITALS
BODY MASS INDEX: 25.76 KG/M2 | DIASTOLIC BLOOD PRESSURE: 68 MMHG | TEMPERATURE: 98.3 F | HEART RATE: 85 BPM | WEIGHT: 170 LBS | HEIGHT: 68 IN | OXYGEN SATURATION: 90 % | SYSTOLIC BLOOD PRESSURE: 130 MMHG | RESPIRATION RATE: 18 BRPM

## 2018-05-22 LAB
HCT VFR BLD AUTO: 27.5 % (ref 41.1–50.3)
HCT VFR BLD AUTO: 29.6 % (ref 41.1–50.3)
HGB BLD-MCNC: 9.1 G/DL (ref 13.6–17.2)
HGB BLD-MCNC: 9.9 G/DL (ref 13.6–17.2)

## 2018-05-22 PROCEDURE — 74011250637 HC RX REV CODE- 250/637: Performed by: INTERNAL MEDICINE

## 2018-05-22 PROCEDURE — 97165 OT EVAL LOW COMPLEX 30 MIN: CPT

## 2018-05-22 PROCEDURE — C9113 INJ PANTOPRAZOLE SODIUM, VIA: HCPCS | Performed by: INTERNAL MEDICINE

## 2018-05-22 PROCEDURE — 97161 PT EVAL LOW COMPLEX 20 MIN: CPT

## 2018-05-22 PROCEDURE — 74011250636 HC RX REV CODE- 250/636: Performed by: INTERNAL MEDICINE

## 2018-05-22 PROCEDURE — 36415 COLL VENOUS BLD VENIPUNCTURE: CPT | Performed by: INTERNAL MEDICINE

## 2018-05-22 PROCEDURE — 74011000250 HC RX REV CODE- 250: Performed by: INTERNAL MEDICINE

## 2018-05-22 PROCEDURE — 85014 HEMATOCRIT: CPT | Performed by: INTERNAL MEDICINE

## 2018-05-22 RX ORDER — PANTOPRAZOLE SODIUM 40 MG/1
40 TABLET, DELAYED RELEASE ORAL 2 TIMES DAILY
Qty: 60 TAB | Refills: 2 | Status: SHIPPED | OUTPATIENT
Start: 2018-05-22 | End: 2018-10-03 | Stop reason: SDUPTHER

## 2018-05-22 RX ADMIN — SODIUM CHLORIDE 40 MG: 9 INJECTION INTRAMUSCULAR; INTRAVENOUS; SUBCUTANEOUS at 09:03

## 2018-05-22 RX ADMIN — SODIUM CHLORIDE 10 ML: 9 INJECTION, SOLUTION INTRAMUSCULAR; INTRAVENOUS; SUBCUTANEOUS at 05:06

## 2018-05-22 RX ADMIN — METOPROLOL SUCCINATE 50 MG: 50 TABLET, EXTENDED RELEASE ORAL at 09:03

## 2018-05-22 RX ADMIN — LISINOPRIL 5 MG: 5 TABLET ORAL at 09:03

## 2018-05-22 RX ADMIN — VERAPAMIL HYDROCHLORIDE 240 MG: 240 TABLET, FILM COATED, EXTENDED RELEASE ORAL at 09:03

## 2018-05-22 RX ADMIN — SODIUM CHLORIDE 100 ML/HR: 900 INJECTION, SOLUTION INTRAVENOUS at 09:04

## 2018-05-22 RX ADMIN — ISOSORBIDE MONONITRATE 30 MG: 30 TABLET, EXTENDED RELEASE ORAL at 09:03

## 2018-05-22 NOTE — PROGRESS NOTES
GI DAILY PROGRESS NOTE    Admit Date:  5/19/2018    Today's Date:  5/22/2018    CC:  Anemia, melena    Subjective:     Patient s/p EGD yesterday that showed a clean based linear ulcer in the antrum that likely caused bleeding. No active bleeding, or visible vessel noted. Single biopsy taken from the antrum and the body for h pylori which is pending. Patient had one small dark bm overnight and hb stable. He is tolerating regular diet and aspirin and plavix still held.     Medications:   Current Facility-Administered Medications   Medication Dose Route Frequency    lactated Ringers infusion  100 mL/hr IntraVENous CONTINUOUS    lactated Ringers infusion  100 mL/hr IntraVENous CONTINUOUS    sodium chloride (NS) flush 5-10 mL  5-10 mL IntraVENous PRN    0.9% sodium chloride infusion 250 mL  250 mL IntraVENous PRN    tuberculin injection 5 Units  5 Units IntraDERMal ONCE    0.9% sodium chloride infusion 250 mL  250 mL IntraVENous PRN    amitriptyline (ELAVIL) tablet 10 mg  10 mg Oral QHS    isosorbide mononitrate ER (IMDUR) tablet 30 mg  30 mg Oral DAILY    lisinopril (PRINIVIL, ZESTRIL) tablet 5 mg  5 mg Oral DAILY    metoprolol succinate (TOPROL-XL) XL tablet 50 mg  50 mg Oral DAILY    pramipexole (MIRAPEX) tablet 0.5 mg  0.5 mg Oral QHS    simvastatin (ZOCOR) tablet 10 mg  10 mg Oral QHS    verapamil ER (CALAN-SR) tablet 240 mg  240 mg Oral DAILY    sodium chloride (NS) flush 5-10 mL  5-10 mL IntraVENous Q8H    sodium chloride (NS) flush 5-10 mL  5-10 mL IntraVENous PRN    ondansetron (ZOFRAN) injection 4 mg  4 mg IntraVENous Q4H PRN    pantoprazole (PROTONIX) 40 mg in sodium chloride 0.9% 10 mL injection  40 mg IntraVENous Q12H    0.9% sodium chloride infusion  100 mL/hr IntraVENous CONTINUOUS    acetaminophen (TYLENOL) tablet 650 mg  650 mg Oral Q6H PRN    HYDROcodone-acetaminophen (NORCO) 5-325 mg per tablet 0.5 Tab  0.5 Tab Oral Q4H PRN    Or    HYDROcodone-acetaminophen (NORCO) 5-325 mg per tablet 1 Tab  1 Tab Oral Q4H PRN       Review of Systems:  ROS was obtained, with pertinent positives as listed above. No chest pain or SOB. Diet:  reg    Objective:   Vitals:  Visit Vitals    /66    Pulse 70    Temp 98.3 °F (36.8 °C)    Resp 18    Ht 5' 8\" (1.727 m)    Wt 77.1 kg (170 lb)    SpO2 92%    BMI 25.85 kg/m2     Intake/Output:     05/20 1901 - 05/22 0700  In: 899.2 [I.V.:400]  Out: 2   Exam:  General appearance: alert, cooperative, no distress  Lungs: clear to auscultation bilaterally anteriorly  Heart: regular rate and rhythm  Abdomen: soft, non-tender.  Bowel sounds normal. No masses, no organomegaly  Extremities: extremities normal, atraumatic, no cyanosis or edema  Neuro:  alert and oriented    Data Review (Labs):    Recent Labs      05/22/18   0140  05/21/18   1308  05/20/18   2220  05/20/18   1051  05/20/18   0603  05/19/18   1948   WBC   --   6.5   --    --    --   10.1   HGB  9.1*  9.5*  9.5*  7.6*  7.6*  9.2*   HCT  27.5*  28.2*  28.5*  22.8*  22.9*  27.5*   PLT   --   206   --    --    --   267   MCV   --   89.5   --    --    --   88.7   NA   --    --    --    --   143  141   K   --    --    --    --   4.1  4.2   CL   --    --    --    --   109*  106   CO2   --    --    --    --   28  29   BUN   --    --    --    --   23  33*   CREA   --    --    --    --   0.59*  0.76*   CA   --    --    --    --   8.0*  8.4   GLU   --    --    --    --   94  106*   AP   --    --    --    --    --   68   SGOT   --    --    --    --    --   21   ALB   --    --    --    --    --   3.1*   TP   --    --    --    --    --   6.1*   PTP   --    --    --    --    --   14.4   INR   --    --    --    --    --   1.2       Assessment:     Principal Problem:    Upper GI bleed (5/19/2018)    Active Problems:    Hypertension ()      Overview: controlled w/med      Restless leg syndrome ()      Overview: manged with medications      Coronary artery disease involving native coronary artery of native heart without angina pectoris (11/14/2016)      DAVID (obstructive sleep apnea) (11/14/2016)      Acute blood loss anemia (5/19/2018)     Patient is a 78 y.o. WM with hx of CAD on Plavix and asa, (stent 15 years ago and MI 2014) who is seen in consultation at the request of Dr. Luiz Staples for dark stools and noted to have gastric ulcer     Plan:     PPI 40mg BID x 3 months and then repeat EGD at that time (will set up through our office)  F/up pathology for h pylori  Can resume aspirin and plavix today - if he has recurrent bleeding then d/c of plavix indefinintely will need to be considered.   Will sign off and set up outpatient f/up for EGD    Deborah Sinha MD  [facility], [place]

## 2018-05-22 NOTE — PROGRESS NOTES
Problem: Mobility Impaired (Adult and Pediatric)  Goal: *Acute Goals and Plan of Care (Insert Text)    PHYSICAL THERAPY: Initial Assessment, Discharge, AM 5/22/2018  INPATIENT: Hospital Day: 4  [place] / Product Type: Medicare /      NAME/AGE/GENDER: Lolis Viramontes is a 78 y.o. male   PRIMARY DIAGNOSIS: Gastrointestinal hemorrhage with melena [K92.1] Upper GI bleed Upper GI bleed  Procedure(s) (LRB):  ESOPHAGOGASTRODUODENOSCOPY (EGD) (N/A)  ESOPHAGOGASTRODUODENAL (EGD) BIOPSY (N/A)  1 Day Post-Op  ICD-10: Treatment Diagnosis:    · Generalized Muscle Weakness (M62.81)  · Other abnormalities of gait and mobility (R26.89)   Precaution/Allergies:  Augmentin [amoxicillin-pot clavulanate] and Oxycodone      ASSESSMENT:     Mr. Ramos Kaapdia is a 78year old male admitted from home for GI bleed, symptomatic anemia. He presents sitting up in bed on his laptop without complaints and is agreeable to therapy assessment. Lives with wife and is completely independent, active at baseline. Pt performs all mobility, transfers, and ambulation independently without use of any DME. Ambulates 500' in hallway with intact balance, no gait deficits. Accelerated gait pace noted. Returned to room and up to edge of bed at his request with needs in reach and family present. Lolis Viramontes appears to be functioning at baseline regarding strength and mobility and has no need for continued therapy services during acute care stay or at home. Pt in agreement. Will discharge. This section established at most recent assessment   PROBLEM LIST (Impairments causing functional limitations):   1. none   INTERVENTIONS PLANNED: (Benefits and precautions of physical therapy have been discussed with the patient.)  1. none; eval and dc     TREATMENT PLAN: Frequency/Duration: none; pt evaluated and discharged 5/22/18 due to independence       RECOMMENDED REHABILITATION/EQUIPMENT: (at time of discharge pending progress): Due to the probability of continued deficits (see above) this patient will not likely need continued skilled physical therapy after discharge. Equipment:    None at this time              HISTORY:   History of Present Injury/Illness (Reason for Referral):  Per H&P, \"Cachorro Lazo is a 79yoM with CAD on ASA and plavix, HTN, HLD who presented to [facility] ED with 2 days of melenic stool. He has had 4 dark, tarry BMs today. He has had some worsening weakness and TIDWELL today as well. Also with mild nausea, lower quadrant abd pain and mild epigastric discomfort. No hematemesis, hematochezia. Had a GIB from PUD about 10yrs ago. Not on PPI now. Very rarely takes NSAIDs, had 1 aleve last week. No ETOH. In the ED, rectal exam is heme positive. Hgb has dropped from 11 to 9.2 in last 24hrs. Hospitalist asked to admit for symptomatic anemia and UGIB. \"    Past Medical History/Comorbidities:   Mr. Jose Raul Hickman  has a past medical history of Allergic rhinitis; Aortic insufficiency; Aortic sclerosis; BPH with urinary obstruction; CAD (coronary artery disease); Chronic pain; Depression; Dyslipidemia; Former pipe smoker; GERD (gastroesophageal reflux disease); GI bleed; History of depression; Hypercholesterolemia; Hypertension; IBS (irritable bowel syndrome); Macular degeneration; Old MI (myocardial infarction); Osteoarthritis; PUD (peptic ulcer disease) (3/2011); Restless leg syndrome; Seasonal allergic rhinitis; and Unspecified sleep apnea. He also has no past medical history of Adverse effect of anesthesia; Difficult intubation; Malignant hyperthermia due to anesthesia; Nausea & vomiting; or Pseudocholinesterase deficiency.   Mr. Jose Raul Hickman  has a past surgical history that includes hx cataract removal (Bilateral); hx tonsil and adenoidectomy; hx turp (05/15; 07/15); hx colonoscopy; hx heart catheterization (2010, 3/2011,4/2011); hx heart catheterization (9/2014); hx shoulder arthroscopy (Right, 2010 approx); and hx shoulder replacement (Right, 2015). Social History/Living Environment:   Home Environment: Private residence  # Steps to Enter: 1  One/Two Story Residence: One story  Living Alone: No  Support Systems: Spouse/Significant Other/Partner  Patient Expects to be Discharged to[de-identified] Private residence  Current DME Used/Available at Home: Grab bars, Raised toilet seat  Tub or Shower Type: Shower  Prior Level of Function/Work/Activity:  Connor Puckett lives with wife in single story home with ramp/1 step to enter. He is independent and active; works out often in home gym. No falls or DME use. Drives. Number of Personal Factors/Comorbidities that affect the Plan of Care: 0: LOW COMPLEXITY   EXAMINATION:   Most Recent Physical Functioning:   Gross Assessment:  AROM: Within functional limits  Strength: Within functional limits  Coordination: Within functional limits               Posture:  Posture (WDL): Within defined limits  Balance:  Sitting: Intact  Standing: Intact Bed Mobility:  Rolling: Independent  Supine to Sit: Independent  Sit to Supine: Independent  Scooting: Independent  Wheelchair Mobility:     Transfers:  Sit to Stand: Independent  Stand to Sit: Independent  Bed to Chair: Independent  Gait:     Speed/Alisa: Accelerated  Distance (ft): 500 Feet (ft)  Ambulation - Level of Assistance: Independent      Body Structures Involved:  1. None Body Functions Affected:  1. None Activities and Participation Affected:  1. None   Number of elements that affect the Plan of Care: 1-2: LOW COMPLEXITY   CLINICAL PRESENTATION:   Presentation: Stable and uncomplicated: LOW COMPLEXITY   CLINICAL DECISION MAKIN [facility] [address] [id number] [id number] 6 Clicks   Basic Mobility Inpatient Short Form  How much difficulty does the patient currently have. .. Unable A Lot A Little None   1. Turning over in bed (including adjusting bedclothes, sheets and blankets)? [] 1   [] 2   [] 3   [x] 4   2.   Sitting down on and standing up from a chair with arms ( e.g., wheelchair, bedside commode, etc.)   [] 1   [] 2   [] 3   [x] 4   3. Moving from lying on back to sitting on the side of the bed? [] 1   [] 2   [] 3   [x] 4   How much help from another person does the patient currently need. .. Total A Lot A Little None   4. Moving to and from a bed to a chair (including a wheelchair)? [] 1   [] 2   [] 3   [x] 4   5. Need to walk in hospital room? [] 1   [] 2   [] 3   [x] 4   6. Climbing 3-5 steps with a railing? [] 1   [] 2   [] 3   [x] 4   © 2007, Trustees of [address] [place] [address] [address] [id number], under license to [employer]. All rights reserved      Score:  Initial: 24 Most Recent: X (Date: -- )    Interpretation of Tool:  Represents activities that are increasingly more difficult (i.e. Bed mobility, Transfers, Gait). Score 24 23 22-20 19-15 14-10 9-7 6     Modifier CH CI CJ CK CL CM CN      ? Mobility - Walking and Moving Around:     - CURRENT STATUS: CH - 0% impaired, limited or restricted    - GOAL STATUS: CH - 0% impaired, limited or restricted    - D/C STATUS:  CH - 0% impaired, limited or restricted  [place] / Product Type: Medicare /      Medical Necessity:     · Mr. Lucy Bowens is currently performing all mobility and ambulation independently. Reason for Services/Other Comments:  · Mr. Lucy Bowens is functioning at baseline and is ambulating without assist. No need for continued therapy services.    Use of outcome tool(s) and clinical judgement create a POC that gives a: Clear prediction of patient's progress: LOW COMPLEXITY            TREATMENT:   (In addition to Assessment/Re-Assessment sessions the following treatments were rendered)   Pre-treatment Symptoms/Complaints:  \"thank you\"  Pain: Initial:   Pain Intensity 1: 0  Post Session:  0/10     Assessment/Reassessment only, no treatment provided today    Braces/Orthotics/Lines/Etc:   · O2 Device: Room air  Treatment/Session Assessment:    · Response to Treatment:  Pt performs all mobility independently without assist  · Interdisciplinary Collaboration:   o Physical Therapist  o Registered Nurse  · After treatment position/precautions:   o Bed/Chair-wheels locked  o Bed in low position  o Call light within reach  o Family at bedside  o sitting at edge of bed     · Recommendations/Intent for next treatment session: None; pt evaluated and discharged due to independence.   Total Treatment Duration:  PT Patient Time In/Time Out  Time In: 1038  Time Out: [address], DPT

## 2018-05-22 NOTE — DISCHARGE SUMMARY
Hospitalist Discharge Summary     Admit Date:  2018  7:50 PM   Name:  Rose Samson   Age:  78 y.o.  :  1940   MRN:  [id number]   PCP:  Nate Warren MD  Treatment Team: Attending Provider: Jacey Lan MD; Utilization Review: Stefania Aguilar RN; Utilization Review: Chandni Woo RN    Problem List for this Hospitalization:  Hospital Problems as of 2018  Date Reviewed: 2018          Codes Class Noted - Resolved POA    * (Principal)Upper GI bleed ICD-10-CM: K92.2  ICD-9-CM: 578.9  2018 - Present Unknown        Acute blood loss anemia ICD-10-CM: D62  ICD-9-CM: 285.1  2018 - Present Yes        Coronary artery disease involving native coronary artery of native heart without angina pectoris ICD-10-CM: I25.10  ICD-9-CM: 414.01  2016 - Present Yes        DAVID (obstructive sleep apnea) ICD-10-CM: G47.33  ICD-9-CM: 327.23  2016 - Present Yes        Hypertension ICD-10-CM: I10  ICD-9-CM: 401.9  Unknown - Present Yes    Overview Signed 2015  8:46 AM by Nova Arnold     controlled w/med             Restless leg syndrome ICD-10-CM: G25.81  ICD-9-CM: 333.94  Unknown - Present Yes    Overview Signed 2015  8:52 AM by Nova Arnold     manged with medications                     Admission HPI from 2018:    \"Cachorro Vargas is a 79yoM with CAD on ASA and plavix, HTN, HLD who presented to [facility] ED with 2 days of melenic stool. He has had 4 dark, tarry BMs today. He has had some worsening weakness and TIDWELL today as well. Also with mild nausea, lower quadrant abd pain and mild epigastric discomfort. No hematemesis, hematochezia. Had a GIB from PUD about 10yrs ago. Not on PPI now. Very rarely takes NSAIDs, had 1 aleve last week. No ETOH. In the ED, rectal exam is heme positive. Hgb has dropped from 11 to 9.2 in last 24hrs. Hospitalist asked to admit for symptomatic anemia and UGIB. \"    Hospital Course:  Pt was admitted. He was transfused 2 units prbcs.  Hb [place] to 9.1. He has not had any further bleeding. He had a egd with gi on 05/21/2018 which which showed a clean based linear ulcer in the antrum that likely caused bleeding.  No active bleeding, or visible vessel noted.  Single biopsy taken from the antrum and the body for h pylori which is pending. He is stable for discharge. He can re-start his plavix and aspirin. If he re-bleeds may have to re-consider plavix per gi. Follow up instructions below. Plan was discussed with patient and IDT. All questions answered. Patient was stable at time of discharge and was instructed to call or return if there are any concerns or recurrence of symptoms. Diagnostic Imaging/Tests:   Xr Chest Pa Lat    Result Date: 5/19/2018  EXAM:  XR CHEST PA LAT INDICATION:   sob COMPARISON: None. FINDINGS: PA and lateral radiographs of the chest demonstrate an elevated right hemidiaphragm. Minimal linear bibasilar atelectasis. The cardiac and mediastinal contours and pulmonary vascularity are normal.  Right shoulder replacement. IMPRESSION: Minimal linear bibasilar atelectasis. Markedly elevated right hemidiaphragm. Echocardiogram results:  No results found for this visit on 05/19/18.       All Micro Results     None          Labs: Results:       BMP, Mg, Phos Recent Labs      05/20/18   0603  05/19/18 1948   NA  143  141   K  4.1  4.2   CL  109*  106   CO2  28  29   AGAP  6*  6*   BUN  23  33*   CREA  0.59*  0.76*   CA  8.0*  8.4   GLU  94  106*      CBC Recent Labs      05/22/18   1143  05/22/18   0140  05/21/18   1308   05/19/18   1948   WBC   --    --   6.5   --   10.1   RBC   --    --   3.15*   --   3.10*   HGB  9.9*  9.1*  9.5*   < >  9.2*   HCT  29.6*  27.5*  28.2*   < >  27.5*   PLT   --    --   206   --   267   GRANS   --    --   60   --   68   LYMPH   --    --   35   --   25   EOS   --    --   3   --   1   MONOS   --    --   1*   --   6   BASOS   --    --   1   --   0   IG   --    --   0   --   0   ANEU   -- --   3.9   --   6.9   ABL   --    --   2.3   --   2.5   JUAN PABLO   --    --   0.2   --   0.1   ABM   --    --   0.1   --   0.6   ABB   --    --   0.0   --   0.0   AIG   --    --   0.0   --   0.0    < > = values in this interval not displayed.       LFT Recent Labs      05/19/18   1948   SGOT  21   ALT  22   AP  68   TP  6.1*   ALB  3.1*   GLOB  3.0   AGRAT  1.0*      Cardiac Testing Lab Results   Component Value Date/Time    Troponin-I, Qt. 0.03 05/19/2018 07:48 PM      Coagulation Tests Lab Results   Component Value Date/Time    Prothrombin time 14.4 05/19/2018 07:48 PM    Prothrombin time 13.5 05/18/2018 07:42 PM    INR 1.2 05/19/2018 07:48 PM    INR 1.1 05/18/2018 07:42 PM      A1c No results found for: HBA1C, HGBE8, [id number]   Lipid Panel Lab Results   Component Value Date/Time    Cholesterol, total 139 01/12/2018 09:12 AM    HDL Cholesterol 56 01/12/2018 09:12 AM    LDL, calculated 70 01/12/2018 09:12 AM    VLDL, calculated 13 01/12/2018 09:12 AM    Triglyceride 67 01/12/2018 09:12 AM      Thyroid Panel Lab Results   Component Value Date/Time    TSH 2.410 01/12/2018 09:12 AM    TSH 1.780 09/16/2016 11:24 AM        Most Recent UA Lab Results   Component Value Date/Time    Color YELLOW 07/13/2015 04:00 PM    Appearance CLEAR 07/13/2015 04:00 PM    Specific gravity 1.004 07/13/2015 04:00 PM    pH (UA) 7.0 07/13/2015 04:00 PM    Protein NEGATIVE  07/13/2015 04:00 PM    Glucose NEGATIVE  07/13/2015 04:00 PM    Ketone NEGATIVE  07/13/2015 04:00 PM    Bilirubin NEGATIVE  07/13/2015 04:00 PM    Blood MODERATE (A) 07/13/2015 04:00 PM    Urobilinogen 0.2 07/13/2015 04:00 PM    Nitrites NEGATIVE  07/13/2015 04:00 PM    Leukocyte Esterase MODERATE (A) 07/13/2015 04:00 PM        Allergies   Allergen Reactions    Augmentin [Amoxicillin-Pot Clavulanate] Nausea and Vomiting     Severe abd cramping  Severe abd cramping    Oxycodone Unknown (comments)     Sever headaches, abdominal issues     Immunization History   Administered Date(s) Administered    Influenza Vaccine 11/18/2014, 11/14/2016    Pneumococcal Polysaccharide (PPSV-23) 08/29/2015    Td 05/28/2014    Tdap 03/16/2016       All Labs from Last 24 Hrs:  Recent Results (from the past 24 hour(s))   HGB & HCT    Collection Time: 05/22/18  1:40 AM   Result Value Ref Range    HGB 9.1 (L) 13.6 - 17.2 g/dL    HCT 27.5 (L) 41.1 - 50.3 %   HGB & HCT    Collection Time: 05/22/18 11:43 AM   Result Value Ref Range    HGB 9.9 (L) 13.6 - 17.2 g/dL    HCT 29.6 (L) 41.1 - 50.3 %       Discharge Exam:  Patient Vitals for the past 24 hrs:   Temp Pulse Resp BP SpO2   05/22/18 1119 98.3 °F (36.8 °C) 85 18 130/68 90 %   05/22/18 0722 98.3 °F (36.8 °C) 70 18 150/66 92 %   05/22/18 0407 98.1 °F (36.7 °C) 70 18 128/78 94 %   05/22/18 0343 98.2 °F (36.8 °C) 72 18 123/74 95 %   05/21/18 2236 98 °F (36.7 °C) 69 18 125/62 96 %   05/21/18 1945 98.5 °F (36.9 °C) 76 18 128/60 93 %     Oxygen Therapy  O2 Sat (%): 90 % (05/22/18 1119)  Pulse via Oximetry: 69 beats per minute (05/21/18 1116)  O2 Device: Room air (05/22/18 1053)  O2 Flow Rate (L/min): 2 l/min (05/21/18 1116)    Intake/Output Summary (Last 24 hours) at 05/22/18 1434  Last data filed at 05/22/18 1010   Gross per 24 hour   Intake              360 ml   Output                0 ml   Net              360 ml       General:    Well nourished. Alert. No distress. Eyes:   Normal sclera. Extraocular movements intact. ENT:  Normocephalic, atraumatic. Moist mucous membranes  CV:   Regular rate and rhythm. No murmur, rub, or gallop. Lungs:  Clear to auscultation bilaterally. No wheezing, rhonchi, or rales. Abdomen: Soft, nontender, nondistended. Bowel sounds normal.   Extremities: Warm and dry. No cyanosis or edema. Neurologic: CN II-XII grossly intact. Sensation intact. Skin:     No rashes or jaundice. Psych:  Normal mood and affect.     Discharge Info:   Current Discharge Medication List      START taking these medications    Details pantoprazole (PROTONIX) 40 mg tablet Take 1 Tab by mouth two (2) times a day. Qty: 60 Tab, Refills: 2         CONTINUE these medications which have NOT CHANGED    Details   verapamil ER (CALAN-SR) 240 mg CR tablet Take 1 Tab by mouth daily. Indications: hypertension  Qty: 90 Tab, Refills: 3      metoprolol succinate (TOPROL-XL) 50 mg XL tablet Take 1 tablet by mouth once daily  Indications: hypertension  Qty: 90 Tab, Refills: 3      simvastatin (ZOCOR) 20 mg tablet Take 1 Tab by mouth nightly. Indications: mixed hyperlipidemia  Qty: 90 Tab, Refills: 3      isosorbide mononitrate ER (IMDUR) 30 mg tablet Take 1 tablet by mouth once daily  Indications: CHRONIC STABLE ANGINA PECTORIS  Qty: 90 Tab, Refills: 3      lisinopril (PRINIVIL, ZESTRIL) 5 mg tablet Take 1 Tab by mouth daily. Indications: hypertension  Qty: 90 Tab, Refills: 3      pramipexole (MIRAPEX) 0.5 mg tablet Take 1 Tab by mouth nightly. Indications: Restless Legs Syndrome  Qty: 90 Tab, Refills: 3      amitriptyline (ELAVIL) 10 mg tablet TAKE 1 TABLET ONCE DAILY   FOR GASTROINTESTINAL UPSET PREVENTION/IRRITABLE BOWEL SYNDROME  Qty: 90 Tab, Refills: 3      cholecalciferol (VITAMIN D3) 1,000 unit cap Take 1,000 Units by mouth daily. acetaminophen (TYLENOL ARTHRITIS PAIN) 650 mg CR tablet Take 650 mg by mouth every six (6) hours as needed for Pain. Take / use AM day of surgery  per anesthesia protocols if needed      aspirin delayed-release 81 mg tablet Take 81 mg by mouth daily. Take / use AM day of surgery  per anesthesia protocols. fexofenadine-pseudoephedrine (ALLEGRA-D 12 HOUR)  mg per tablet Take 1 Tab by mouth daily as needed. Indications: Allergic Rhinitis      cpap machine kit by Does Not Apply route. clotrimazole-betamethasone (LOTRISONE) topical cream Apply  to affected area two (2) times a day.   Qty: 30 g, Refills: 0      HYDROcodone-acetaminophen (NORCO) 5-325 mg per tablet Take 0.5-1 Tabs by mouth every four (4) hours as needed for Pain. Max Daily Amount: 6 Tabs. Qty: 30 Tab, Refills: 0      VIT C/E/ZN/COPPR/LUTEIN/ZEAXAN (PRESERVISION AREDS 2 PO) Take 2 Caps by mouth daily. nitroglycerin (NITROSTAT) 0.4 mg SL tablet 1 Tab by SubLINGual route every five (5) minutes as needed for Chest Pain (Last reported use was 4/12/15 approx). Qty: 30 Tab, Refills: 5      omega-3 fatty acids-vitamin e (FISH OIL) 1,000 mg cap Take 2 Caps by mouth every morning. STOP taking these medications       ranitidine (ZANTAC) 150 mg tablet Comments:   Reason for Stopping:         valACYclovir (VALTREX) 1 gram tablet Comments:   Reason for Stopping:                 Disposition:  Home   Activity: as tolerated  Diet: DIET REGULAR    Follow-up Appointments   Procedures    FOLLOW UP VISIT Appointment in: Other (Farzana Benitez) With GI as scheduled     With GI as scheduled     Standing Status:   Standing     Number of Occurrences:   1     Order Specific Question:   Appointment in     Answer: Other (Specify)         Follow-up Information     Follow up With Details Comments [id number], [place]   [address] Deshawn Calero [address] [facility]  [phone]      [facility]  as scheduled [facility]  6625          Time spent in patient discharge planning and coordination 35 minutes.     Signed:  Barbie Chavez MD

## 2018-05-22 NOTE — PROGRESS NOTES
Pt was calm and slept all night. Pt Hgb was rechecked and it was 9.1. Hourly rounds were done and pt needs were met. Report will be given to day shift nurse and will continue yo monitor.

## 2018-05-22 NOTE — DISCHARGE INSTRUCTIONS
DISCHARGE SUMMARY from Nurse    PATIENT INSTRUCTIONS:    After general anesthesia or intravenous sedation, for 24 hours or while taking prescription Narcotics:  · Limit your activities  · Do not drive and operate hazardous machinery  · Do not make important personal or business decisions  · Do  not drink alcoholic beverages  · If you have not urinated within 8 hours after discharge, please contact your surgeon on call. Report the following to your surgeon:  · Excessive pain, swelling, redness or odor of or around the surgical area  · Temperature over 100.5  · Nausea and vomiting lasting longer than 4 hours or if unable to take medications  · Any signs of decreased circulation or nerve impairment to extremity: change in color, persistent  numbness, tingling, coldness or increase pain  · Any questions    What to do at Home:  Recommended activity: Activity as tolerated,     If you experience any of the following symptoms fever, chills, new or unrelieved pain, nausea or vomiting, reoccurrence of bleeding, dizziness, chest pain or shortness of breath, please follow up with PCP/GI. *  Please give a list of your current medications to your Primary Care Provider. *  Please update this list whenever your medications are discontinued, doses are      changed, or new medications (including over-the-counter products) are added. *  Please carry medication information at all times in case of emergency situations. These are general instructions for a healthy lifestyle:    No smoking/ No tobacco products/ Avoid exposure to second hand smoke  Surgeon General's Warning:  Quitting smoking now greatly reduces serious risk to your health.     Obesity, smoking, and sedentary lifestyle greatly increases your risk for illness    A healthy diet, regular physical exercise & weight monitoring are important for maintaining a healthy lifestyle    You may be retaining fluid if you have a history of heart failure or if you experience any of the following symptoms:  Weight gain of 3 pounds or more overnight or 5 pounds in a week, increased swelling in our hands or feet or shortness of breath while lying flat in bed. Please call your doctor as soon as you notice any of these symptoms; do not wait until your next office visit. Recognize signs and symptoms of STROKE:    F-face looks uneven    A-arms unable to move or move unevenly    S-speech slurred or non-existent    T-time-call 911 as soon as signs and symptoms begin-DO NOT go       Back to bed or wait to see if you get better-TIME IS BRAIN. Warning Signs of HEART ATTACK     Call 911 if you have these symptoms:   Chest discomfort. Most heart attacks involve discomfort in the center of the chest that lasts more than a few minutes, or that goes away and comes back. It can feel like uncomfortable pressure, squeezing, fullness, or pain.  Discomfort in other areas of the upper body. Symptoms can include pain or discomfort in one or both arms, the back, neck, jaw, or stomach.  Shortness of breath with or without chest discomfort.  Other signs may include breaking out in a cold sweat, nausea, or lightheadedness. Don't wait more than five minutes to call 911 - MINUTES MATTER! Fast action can save your life. Calling 911 is almost always the fastest way to get lifesaving treatment. Emergency Medical Services staff can begin treatment when they arrive -- up to an hour sooner than if someone gets to the hospital by car. The discharge information has been reviewed with the patient. The patient verbalized understanding. Discharge medications reviewed with the patient and appropriate educational materials and side effects teaching were provided.   ___________________________________________________________________________________________________________________________________     Gastrointestinal Bleeding: Care Instructions  Your Care Instructions    The digestive or gastrointestinal tract goes from the mouth to the anus. It is often called the GI tract. Bleeding can happen anywhere in the GI tract. It may be caused by an ulcer, an infection, or cancer. It may also be caused by medicines such as aspirin or ibuprofen. Light bleeding may not cause any symptoms at first. But if you continue to bleed for a while, you may feel very weak or tired. Sudden, heavy bleeding means you need to see a doctor right away. This kind of bleeding can be very dangerous. But it can usually be cured or controlled. The doctor may do some tests to find the cause of your bleeding. Follow-up care is a key part of your treatment and safety. Be sure to make and go to all appointments, and call your doctor if you are having problems. It's also a good idea to know your test results and keep a list of the medicines you take. How can you care for yourself at home? · Be safe with medicines. Take your medicines exactly as prescribed. Call your doctor if you think you are having a problem with your medicine. You will get more details on the specific medicines your doctor prescribes. · Do not take aspirin or other anti-inflammatory medicines, such as naproxen (Aleve) or ibuprofen (Advil, Motrin), without talking to your doctor first. Ask your doctor if it is okay to use acetaminophen (Tylenol). · Do not drink alcohol. · The bleeding may make you lose iron. So it's important to eat foods that have a lot of iron. These include red meat, shellfish, poultry, and eggs. They also include beans, raisins, whole-grain breads, and leafy green vegetables. If you want help planning meals, you can make an appointment with a dietitian. When should you call for help? Call 911 anytime you think you may need emergency care. For example, call if:  ? · You have sudden, severe belly pain. ? · You vomit blood or what looks like coffee grounds. ? · You passed out (lost consciousness).    ? · Your stools are maroon or very bloody. ?Call your doctor now or seek immediate medical care if:  ? · You are dizzy or lightheaded, or you feel like you may faint. ? · Your stools are black and look like tar, or they have streaks of blood. ? · You have belly pain. ? · You vomit or have nausea. ? · You have trouble swallowing, or it hurts when you swallow. ? Watch closely for changes in your health, and be sure to contact your doctor if:  ? · You do not get better as expected. Where can you learn more? Go to http://lisa-aranza.info/. Enter L186 in the search box to learn more about \"Gastrointestinal Bleeding: Care Instructions. \"  Current as of: March 20, 2017  Content Version: 11.4  © 3124-2294 [employer]. Care instructions adapted under license by [employer] (which disclaims liability or warranty for this information). If you have questions about a medical condition or this instruction, always ask your healthcare professional. [address] any warranty or liability for your use of this information.

## 2018-05-22 NOTE — PROGRESS NOTES
OCCUPATIONAL THERAPY: Initial Assessment and AM 5/22/2018  INPATIENT: Hospital Day: 4  [place] / [place] / Product Type: Medicare /      NAME/AGE/GENDER: Meredith Meadows is a 78 y.o. male   PRIMARY DIAGNOSIS:  Gastrointestinal hemorrhage with melena [K92.1] Upper GI bleed Upper GI bleed  Procedure(s) (LRB):  ESOPHAGOGASTRODUODENOSCOPY (EGD) (N/A)  ESOPHAGOGASTRODUODENAL (EGD) BIOPSY (N/A)  1 Day Post-Op  ICD-10: Treatment Diagnosis:    · Generalized Muscle Weakness (M62.81)   Precautions/Allergies:     Augmentin [amoxicillin-pot clavulanate] and Oxycodone      ASSESSMENT:     Mr. Aron Fried presents for the above. Upon arrival, pt supine in bed with wife at bedside. Pt is very pleasant and agreeable to OT evaluation. Pt is oriented x 4. Pt lives with wife in [facility] with 1 step to enter and has access to a walkin shower with built-in shower bench. Pt reports independence in ADLs and functional mobility at baseline. Pt completed supine to sit transfer and sit to stand transfer with independence. Pt ambulated ~250 ft with SBA and no AD. Pt's BUE AROM, strength, and coordination are all within functional limits. Pt returned to upright in bed with needs met. At this time pt is functioning at baseline for ADL performance and functional mobility. Pt does not require skilled OT services. This section established at most recent assessment   PROBLEM LIST (Impairments causing functional limitations): 1. none   INTERVENTIONS PLANNED: (Benefits and precautions of occupational therapy have been discussed with the patient.)  1. discharge     TREATMENT PLAN: Frequency/Duration:  Rehabilitation Potential For Stated Goals:     RECOMMENDED REHABILITATION/EQUIPMENT: (at time of discharge pending progress): Due to the probability of continued deficits (see above) this patient will not likely need continued skilled occupational therapy after discharge.   Equipment:    None at this time OCCUPATIONAL PROFILE AND HISTORY:   History of Present Injury/Illness (Reason for Referral):  See H&P  Past Medical History/Comorbidities:   Mr. Angel Herrera  has a past medical history of Allergic rhinitis; Aortic insufficiency; Aortic sclerosis; BPH with urinary obstruction; CAD (coronary artery disease); Chronic pain; Depression; Dyslipidemia; Former pipe smoker; GERD (gastroesophageal reflux disease); GI bleed; History of depression; Hypercholesterolemia; Hypertension; IBS (irritable bowel syndrome); Macular degeneration; Old MI (myocardial infarction); Osteoarthritis; PUD (peptic ulcer disease) (3/2011); Restless leg syndrome; Seasonal allergic rhinitis; and Unspecified sleep apnea. He also has no past medical history of Adverse effect of anesthesia; Difficult intubation; Malignant hyperthermia due to anesthesia; Nausea & vomiting; or Pseudocholinesterase deficiency. Mr. Angel Herrera  has a past surgical history that includes hx cataract removal (Bilateral); hx tonsil and adenoidectomy; hx turp (05/15; 07/15); hx colonoscopy; hx heart catheterization (2010, 3/2011,4/2011); hx heart catheterization (9/2014); hx shoulder arthroscopy (Right, 2010 approx); and hx shoulder replacement (Right, 2015). Social History/Living Environment:   Home Environment: Private residence  # Steps to Enter: 1  Wheelchair Ramp: Yes  One/Two Story Residence: One story  Living Alone: No  Support Systems: Spouse/Significant Other/Partner  Patient Expects to be Discharged to[de-identified] Private residence  Current DME Used/Available at Home: Grab bars, Raised toilet seat  Tub or Shower Type: Shower  Prior Level of Function/Work/Activity:  Independent with ADLs and functional mobility. Personal Factors:          Sex:  male        Age:  78 y.o.         Other factors that influence how disability is experienced by the patient:  Multiple co-morbidities    Number of Personal Factors/Comorbidities that affect the Plan of Care: Brief history (0):  LOW COMPLEXITY   ASSESSMENT OF OCCUPATIONAL PERFORMANCE[de-identified]   Activities of Daily Living:           Basic ADLs (From Assessment) Complex ADLs (From Assessment)   Feeding: Independent  Oral Facial Hygiene/Grooming: Independent  Bathing: Independent  Upper Body Dressing: Independent  Lower Body Dressing: Independent  Toileting: Independent Instrumental ADL  Meal Preparation: Independent  Homemaking: Independent   Grooming/Bathing/Dressing Activities of Daily Living     Cognitive Retraining  Safety/Judgement: Awareness of environment                       Bed/Mat Mobility  Rolling: Independent  Supine to Sit: Independent  Sit to Supine: Independent  Sit to Stand: Independent  Bed to Chair: Independent  Scooting: Independent       Most Recent Physical Functioning:   Gross Assessment:  AROM: Within functional limits  Strength: Within functional limits  Coordination: Within functional limits               Posture:  Posture (WDL): Within defined limits  Balance:  Sitting: Intact  Standing: Intact Bed Mobility:  Rolling: Independent  Supine to Sit: Independent  Sit to Supine: Independent  Scooting: Independent  Wheelchair Mobility:     Transfers:  Sit to Stand: Independent  Stand to Sit: Independent  Bed to Chair: Independent                Patient Vitals for the past 6 hrs:   BP SpO2 Pulse   18 0722 150/66 92 % 70       Mental Status  Neurologic State: Alert  Orientation Level: Oriented X4  Cognition: Appropriate decision making, Follows commands  Perception: Appears intact  Perseveration: No perseveration noted  Safety/Judgement: Awareness of environment                          Physical Skills Involved:  1. none Cognitive Skills Affected (resulting in the inability to perform in a timely and safe manner):   1. none Psychosocial Skills Affected:  1. none   Number of elements that affect the Plan of Care: 1-3:  LOW COMPLEXITY   CLINICAL DECISION MAKIN [facility] [address] [id number] AM-PAC 6 [facility]   Daily Activity Inpatient Short Form  How much help from another person does the patient currently need. .. Total A Lot A Little None   1. Putting on and taking off regular lower body clothing? [] 1   [] 2   [] 3   [x] 4   2. Bathing (including washing, rinsing, drying)? [] 1   [] 2   [] 3   [x] 4   3. Toileting, which includes using toilet, bedpan or urinal?   [] 1   [] 2   [] 3   [x] 4   4. Putting on and taking off regular upper body clothing? [] 1   [] 2   [] 3   [x] 4   5. Taking care of personal grooming such as brushing teeth? [] 1   [] 2   [] 3   [x] 4   6. Eating meals? [] 1   [] 2   [] 3   [x] 4   © 2007, Trustees of [address] [place] [address] [address] [address], under license to [employer]. All rights reserved      Score:  Initial: 24 Most Recent: X (Date: -- )    Interpretation of Tool:  Represents activities that are increasingly more difficult (i.e. Bed mobility, Transfers, Gait). Score 24 23 22-20 19-15 14-10 9-7 6     Modifier CH CI CJ CK CL CM CN      ? Self Care:     - CURRENT STATUS: CH - 0% impaired, limited or restricted    - GOAL STATUS: CH - 0% impaired, limited or restricted    - D/C STATUS:  [address] [place] [address] - 0% impaired, limited or restricted  [place] / [place] / Product Type: Medicare /      Medical Necessity:     ·   Reason for Services/Other Comments:  ·    Use of outcome tool(s) and clinical judgement create a POC that gives a: LOW COMPLEXITY         TREATMENT:   (In addition to Assessment/Re-Assessment sessions the following treatments were rendered)     Pre-treatment Symptoms/Complaints:  eval only. Pain: Initial:   Pain Intensity 1: 0 /10 Post Session:  same     Assessment/Reassessment only, no treatment provided today    Braces/Orthotics/Lines/Etc:   · IV  · O2 Device: Room air  Treatment/Session Assessment:    · Response to Treatment:  Good. eval only.    · Interdisciplinary Collaboration:   o Occupational Therapist  o Registered Nurse  · After treatment position/precautions:   o Supine in bed  o Bed/Chair-wheels locked  o Call light within reach  o Family at bedside   · Compliance with Program/Exercises:  · Recommendations/Intent for next treatment session:    Total Treatment Duration:  OT Patient Time In/Time Out  Time In: 1010  Time Out: Rommel Almanzar, OT

## 2018-05-23 ENCOUNTER — PATIENT OUTREACH (OUTPATIENT)
Dept: CASE MANAGEMENT | Age: 78
End: 2018-05-23

## 2018-05-23 LAB
ABO + RH BLD: NORMAL
BLD PROD TYP BPU: NORMAL
BLOOD BANK CMNT PATIENT-IMP: NORMAL
BLOOD GROUP ANTIBODIES SERPL: NORMAL
BPU ID: NORMAL
CROSSMATCH RESULT,%XM: NORMAL
SPECIMEN EXP DATE BLD: NORMAL
STATUS OF UNIT,%ST: NORMAL
UNIT DIVISION, %UDIV: 0

## 2018-05-23 NOTE — PROGRESS NOTES
This note will not be viewable in [address] Date/Time of Call:   05/23/2018 10:21am   What was the patient hospitalized for? UGI Bleed  S/P EGD. Naoma Broccoli antral ulcer   Does the patient understand his/her diagnosis and/or treatment and what happened during the hospitalization? Spoke with patient, who verbalized understanding of Dx and Tx plan. Reports he is doing very well, able to be up and about as needed, just a little slow, denies any further bleeding or new symptoms   Did the patient receive discharge instructions? yes   CM Assessed Risk for Readmission:      Patient stated Risk for Readmission:      Low risk for readmission due to return of GI Bleed    Per patient, more bleeding   Review any discharge instructions ([facility] Ask patient if they understand these. Do they have any questions? Discharge instructions reviewed with patient, who verbalized understanding. Focused on education, follow up appointments and med compliance. Were home services ordered (nursing, PT, OT, ST, etc.)? No   If so, has the first visit occurred? If not, why? (Assist with coordination of services if necessary.)   N/A   Was any DME ordered? No   If so, has it been received? If not, why?  (Assist patient in obtaining DME orders &/or equipment if necessary.) N/A   Complete a review of all medications (new, continued and discontinued meds per the D/C instructions and medication tab in [facility]). Medication review completed with patient, who verbalized understanding. Were all new prescriptions filled? If not, why?  (Assist patient in obtaining medications if necessary  escalate for CCM &/or SW if ongoing issues are verbalized by pt or anticipated)   New prescriptions were filled and in the home. Patient denies any problems in obtaining medications at this time. Does the patient understand the purpose and dosing instructions for all medications?   (If patient has questions, provide explanation and education.)   Patient verbalized understanding of purpose for and dosing of medications. Does the patient have any problems in performing ADLs? (If patient is unable to perform ADLs  what is the limiting factor(s)? Do they have a support system that can assist? If no support system is present, discuss possible assistance that they may be able to obtain. Escalate for CCM/SW if ongoing issues are verbalized by pt or anticipated)   Patient is independent with ADLs, and drives. Lives with spouse who is available to assist if needed. Does the patient have all follow-up appointments scheduled? 7 day f/up with PCP?   (f/up with PCP may be w/in 14 days if patient has a f/up with their specialist w/in 7 days)    7-14 day f/up with specialist?   (or per discharge instructions)    If f/up has not been made  what actions has the care coordinator made to accomplish this? Has transportation been arranged? Patient has follow up with GI, Dr Thai Mitchell on 5/28/18. Patient does not have follow up with PCP and declined to schedule at this time, reports Dr Thai Mitchell is taking care of everything. Reports he sees PCP on regular basis for labs and follow up of chronic problems. States PCP is aware of hospitalization and can review records in computer. Will call PCP if problems arise. Patient denies any problems with transportation to visits at this time. Any other questions or concerns expressed by the patient? No other questions or concerns verbalized. Patient agreeable to follow up by Care Coordinator. Schedule next appointment with NAVEEN Mora or refer to RN Case Manager/ per the workflow guidelines. When is care coordinators next follow-up call scheduled? If referred for CCM  what RN care manager was the referral assigned? Plan for next follow up call in 2 to 3 weeks.     Patient has Care Coordinator contact information and was advised to call for any new concerns or needs.    OFELIA Call Completed By: Nacho Corea LPN, [facility] Coordinator

## 2018-06-06 ENCOUNTER — PATIENT OUTREACH (OUTPATIENT)
Dept: CASE MANAGEMENT | Age: 78
End: 2018-06-06

## 2018-06-06 NOTE — PROGRESS NOTES
This note will not be viewable in [address] OFELIA  Follow up call. No answer at home number left message to call for any new concerns or needs. Will close case and reopen if return call received.

## 2018-08-29 ENCOUNTER — APPOINTMENT (OUTPATIENT)
Dept: GENERAL RADIOLOGY | Age: 78
DRG: 177 | End: 2018-08-29
Attending: EMERGENCY MEDICINE
Payer: MEDICARE

## 2018-08-29 ENCOUNTER — APPOINTMENT (OUTPATIENT)
Dept: GENERAL RADIOLOGY | Age: 78
DRG: 177 | End: 2018-08-29
Attending: INTERNAL MEDICINE
Payer: MEDICARE

## 2018-08-29 ENCOUNTER — APPOINTMENT (OUTPATIENT)
Dept: CT IMAGING | Age: 78
DRG: 177 | End: 2018-08-29
Attending: INTERNAL MEDICINE
Payer: MEDICARE

## 2018-08-29 ENCOUNTER — HOSPITAL ENCOUNTER (INPATIENT)
Age: 78
LOS: 4 days | Discharge: HOME | DRG: 177 | End: 2018-09-02
Attending: EMERGENCY MEDICINE | Admitting: INTERNAL MEDICINE
Payer: MEDICARE

## 2018-08-29 DIAGNOSIS — R09.02 HYPOXEMIA: ICD-10-CM

## 2018-08-29 DIAGNOSIS — J96.01 ACUTE RESPIRATORY FAILURE WITH HYPOXIA (HCC): ICD-10-CM

## 2018-08-29 DIAGNOSIS — I31.9 PERICARDITIS WITH EFFUSION: ICD-10-CM

## 2018-08-29 DIAGNOSIS — B37.0 THRUSH: ICD-10-CM

## 2018-08-29 DIAGNOSIS — J90 PLEURAL EFFUSION, LEFT: ICD-10-CM

## 2018-08-29 DIAGNOSIS — K27.9 PEPTIC ULCER DISEASE: ICD-10-CM

## 2018-08-29 DIAGNOSIS — N40.0 BENIGN PROSTATIC HYPERPLASIA, UNSPECIFIED WHETHER LOWER URINARY TRACT SYMPTOMS PRESENT: ICD-10-CM

## 2018-08-29 DIAGNOSIS — J90 PLEURAL EFFUSION, RIGHT: ICD-10-CM

## 2018-08-29 DIAGNOSIS — G47.33 OSA (OBSTRUCTIVE SLEEP APNEA): ICD-10-CM

## 2018-08-29 DIAGNOSIS — M19.011 PRIMARY OSTEOARTHRITIS OF RIGHT SHOULDER: ICD-10-CM

## 2018-08-29 DIAGNOSIS — J15.9 COMMUNITY ACQUIRED BACTERIAL PNEUMONIA: ICD-10-CM

## 2018-08-29 DIAGNOSIS — J18.9 PNEUMONIA OF LEFT LOWER LOBE DUE TO INFECTIOUS ORGANISM: Primary | ICD-10-CM

## 2018-08-29 PROBLEM — R04.2 HEMOPTYSIS: Status: ACTIVE | Noted: 2018-08-29

## 2018-08-29 LAB
ALBUMIN SERPL-MCNC: 2.5 G/DL (ref 3.2–4.6)
ALBUMIN/GLOB SERPL: 0.5 {RATIO} (ref 1.2–3.5)
ALP SERPL-CCNC: 199 U/L (ref 50–136)
ALT SERPL-CCNC: 69 U/L (ref 12–65)
ANION GAP SERPL CALC-SCNC: 12 MMOL/L (ref 7–16)
APPEARANCE FLD: NORMAL
APPEARANCE UR: CLEAR
AST SERPL-CCNC: 72 U/L (ref 15–37)
ATRIAL RATE: 119 BPM
BACTERIA URNS QL MICRO: 0 /HPF
BASOPHILS # BLD: 0.1 K/UL (ref 0–0.2)
BASOPHILS NFR BLD: 0 % (ref 0–2)
BILIRUB SERPL-MCNC: 0.9 MG/DL (ref 0.2–1.1)
BILIRUB UR QL: ABNORMAL
BUN SERPL-MCNC: 14 MG/DL (ref 8–23)
CALCIUM SERPL-MCNC: 9 MG/DL (ref 8.3–10.4)
CALCULATED P AXIS, ECG09: 60 DEGREES
CALCULATED R AXIS, ECG10: 46 DEGREES
CALCULATED T AXIS, ECG11: 95 DEGREES
CASTS URNS QL MICRO: ABNORMAL /LPF
CHLORIDE SERPL-SCNC: 97 MMOL/L (ref 98–107)
CO2 SERPL-SCNC: 27 MMOL/L (ref 21–32)
COLOR FLD: NORMAL
COLOR UR: ABNORMAL
CREAT SERPL-MCNC: 0.86 MG/DL (ref 0.8–1.5)
CRP SERPL-MCNC: 21.4 MG/DL (ref 0–0.9)
DIAGNOSIS, 93000: NORMAL
DIFFERENTIAL METHOD BLD: ABNORMAL
EOSINOPHIL # BLD: 0 K/UL (ref 0–0.8)
EOSINOPHIL NFR BLD: 0 % (ref 0.5–7.8)
EPI CELLS #/AREA URNS HPF: ABNORMAL /HPF
ERYTHROCYTE [DISTWIDTH] IN BLOOD BY AUTOMATED COUNT: 18.2 %
ERYTHROCYTE [SEDIMENTATION RATE] IN BLOOD: 24 MM/HR (ref 0–20)
GLOBULIN SER CALC-MCNC: 5.1 G/DL (ref 2.3–3.5)
GLUCOSE SERPL-MCNC: 129 MG/DL (ref 65–100)
GLUCOSE UR STRIP.AUTO-MCNC: NEGATIVE MG/DL
HCT VFR BLD AUTO: 35.1 % (ref 41.1–50.3)
HGB BLD-MCNC: 10.7 G/DL (ref 13.6–17.2)
HGB UR QL STRIP: NEGATIVE
IMM GRANULOCYTES # BLD: 0.1 K/UL (ref 0–0.5)
IMM GRANULOCYTES NFR BLD AUTO: 1 % (ref 0–5)
KETONES UR QL STRIP.AUTO: 15 MG/DL
LACTATE BLD-SCNC: 2.2 MMOL/L (ref 0.5–1.9)
LACTATE SERPL-SCNC: 0.9 MMOL/L (ref 0.4–2)
LEUKOCYTE ESTERASE UR QL STRIP.AUTO: NEGATIVE
LYMPHOCYTES # BLD: 0.8 K/UL (ref 0.5–4.6)
LYMPHOCYTES NFR BLD: 6 % (ref 13–44)
LYMPHOCYTES NFR FLD: 11 %
MCH RBC QN AUTO: 22.3 PG (ref 26.1–32.9)
MCHC RBC AUTO-ENTMCNC: 30.5 G/DL (ref 31.4–35)
MCV RBC AUTO: 73.3 FL (ref 79.6–97.8)
MONOCYTES # BLD: 0.7 K/UL (ref 0.1–1.3)
MONOCYTES NFR BLD: 5 % (ref 4–12)
MONOS+MACROS NFR FLD: 4 %
NEUTROPHILS NFR FLD: 85 %
NEUTS SEG # BLD: 12.2 K/UL (ref 1.7–8.2)
NEUTS SEG NFR BLD: 88 % (ref 43–78)
NITRITE UR QL STRIP.AUTO: NEGATIVE
NRBC # BLD: 0 K/UL (ref 0–0.2)
NUC CELL # FLD: 2905 /CU MM
P-R INTERVAL, ECG05: 166 MS
PH UR STRIP: 5.5 [PH] (ref 5–9)
PLATELET # BLD AUTO: 635 K/UL (ref 150–450)
PMV BLD AUTO: 10.9 FL (ref 9.4–12.3)
POTASSIUM SERPL-SCNC: 4 MMOL/L (ref 3.5–5.1)
PROCALCITONIN SERPL-MCNC: 0.1 NG/ML
PROT SERPL-MCNC: 7.6 G/DL (ref 6.3–8.2)
PROT UR STRIP-MCNC: 30 MG/DL
Q-T INTERVAL, ECG07: 286 MS
QRS DURATION, ECG06: 90 MS
QTC CALCULATION (BEZET), ECG08: 402 MS
RBC # BLD AUTO: 4.79 M/UL (ref 4.23–5.6)
RBC # FLD: NORMAL /CU MM
RBC #/AREA URNS HPF: ABNORMAL /HPF
SODIUM SERPL-SCNC: 136 MMOL/L (ref 136–145)
SP GR UR REFRACTOMETRY: 1.03 (ref 1–1.02)
SPECIMEN SOURCE FLD: NORMAL
TROPONIN I SERPL-MCNC: <0.02 NG/ML (ref 0.02–0.05)
UROBILINOGEN UR QL STRIP.AUTO: 0.2 EU/DL (ref 0.2–1)
VENTRICULAR RATE, ECG03: 119 BPM
WBC # BLD AUTO: 13.9 K/UL (ref 4.3–11.1)
WBC MORPH BLD: NORMAL
WBC URNS QL MICRO: ABNORMAL /HPF

## 2018-08-29 PROCEDURE — 88305 TISSUE EXAM BY PATHOLOGIST: CPT

## 2018-08-29 PROCEDURE — 32555 ASPIRATE PLEURA W/ IMAGING: CPT | Performed by: INTERNAL MEDICINE

## 2018-08-29 PROCEDURE — 89050 BODY FLUID CELL COUNT: CPT

## 2018-08-29 PROCEDURE — 83605 ASSAY OF LACTIC ACID: CPT

## 2018-08-29 PROCEDURE — 32555 ASPIRATE PLEURA W/ IMAGING: CPT

## 2018-08-29 PROCEDURE — 87116 MYCOBACTERIA CULTURE: CPT

## 2018-08-29 PROCEDURE — C9113 INJ PANTOPRAZOLE SODIUM, VIA: HCPCS | Performed by: INTERNAL MEDICINE

## 2018-08-29 PROCEDURE — 87077 CULTURE AEROBIC IDENTIFY: CPT

## 2018-08-29 PROCEDURE — 87186 SC STD MICRODIL/AGAR DIL: CPT

## 2018-08-29 PROCEDURE — 74011250636 HC RX REV CODE- 250/636: Performed by: EMERGENCY MEDICINE

## 2018-08-29 PROCEDURE — 87205 SMEAR GRAM STAIN: CPT

## 2018-08-29 PROCEDURE — 74011000258 HC RX REV CODE- 258: Performed by: INTERNAL MEDICINE

## 2018-08-29 PROCEDURE — 74011250637 HC RX REV CODE- 250/637: Performed by: INTERNAL MEDICINE

## 2018-08-29 PROCEDURE — 96365 THER/PROPH/DIAG IV INF INIT: CPT | Performed by: EMERGENCY MEDICINE

## 2018-08-29 PROCEDURE — 74011636320 HC RX REV CODE- 636/320: Performed by: INTERNAL MEDICINE

## 2018-08-29 PROCEDURE — 88112 CYTOPATH CELL ENHANCE TECH: CPT

## 2018-08-29 PROCEDURE — 99285 EMERGENCY DEPT VISIT HI MDM: CPT | Performed by: EMERGENCY MEDICINE

## 2018-08-29 PROCEDURE — 86140 C-REACTIVE PROTEIN: CPT

## 2018-08-29 PROCEDURE — 0W9B3ZZ DRAINAGE OF LEFT PLEURAL CAVITY, PERCUTANEOUS APPROACH: ICD-10-PCS | Performed by: INTERNAL MEDICINE

## 2018-08-29 PROCEDURE — 65660000000 HC RM CCU STEPDOWN

## 2018-08-29 PROCEDURE — 71260 CT THORAX DX C+: CPT

## 2018-08-29 PROCEDURE — 84484 ASSAY OF TROPONIN QUANT: CPT

## 2018-08-29 PROCEDURE — 74011250636 HC RX REV CODE- 250/636: Performed by: INTERNAL MEDICINE

## 2018-08-29 PROCEDURE — 80053 COMPREHEN METABOLIC PANEL: CPT

## 2018-08-29 PROCEDURE — C1729 CATH, DRAINAGE: HCPCS

## 2018-08-29 PROCEDURE — 81001 URINALYSIS AUTO W/SCOPE: CPT

## 2018-08-29 PROCEDURE — 87070 CULTURE OTHR SPECIMN AEROBIC: CPT

## 2018-08-29 PROCEDURE — 74011000250 HC RX REV CODE- 250: Performed by: INTERNAL MEDICINE

## 2018-08-29 PROCEDURE — 71045 X-RAY EXAM CHEST 1 VIEW: CPT

## 2018-08-29 PROCEDURE — 87040 BLOOD CULTURE FOR BACTERIA: CPT

## 2018-08-29 PROCEDURE — 85652 RBC SED RATE AUTOMATED: CPT

## 2018-08-29 PROCEDURE — 82945 GLUCOSE OTHER FLUID: CPT

## 2018-08-29 PROCEDURE — 94760 N-INVAS EAR/PLS OXIMETRY 1: CPT

## 2018-08-29 PROCEDURE — 84145 PROCALCITONIN (PCT): CPT

## 2018-08-29 PROCEDURE — 87102 FUNGUS ISOLATION CULTURE: CPT

## 2018-08-29 PROCEDURE — 85025 COMPLETE CBC W/AUTO DIFF WBC: CPT

## 2018-08-29 PROCEDURE — 93005 ELECTROCARDIOGRAM TRACING: CPT | Performed by: EMERGENCY MEDICINE

## 2018-08-29 PROCEDURE — 74011000258 HC RX REV CODE- 258: Performed by: EMERGENCY MEDICINE

## 2018-08-29 PROCEDURE — 99223 1ST HOSP IP/OBS HIGH 75: CPT | Performed by: INTERNAL MEDICINE

## 2018-08-29 PROCEDURE — 84157 ASSAY OF PROTEIN OTHER: CPT

## 2018-08-29 PROCEDURE — 76604 US EXAM CHEST: CPT | Performed by: INTERNAL MEDICINE

## 2018-08-29 PROCEDURE — 36415 COLL VENOUS BLD VENIPUNCTURE: CPT

## 2018-08-29 PROCEDURE — 71046 X-RAY EXAM CHEST 2 VIEWS: CPT

## 2018-08-29 RX ORDER — CLOPIDOGREL BISULFATE 75 MG/1
75 TABLET ORAL DAILY
Status: DISCONTINUED | OUTPATIENT
Start: 2018-08-30 | End: 2018-09-02 | Stop reason: HOSPADM

## 2018-08-29 RX ORDER — SODIUM CHLORIDE 9 MG/ML
75 INJECTION, SOLUTION INTRAVENOUS CONTINUOUS
Status: DISCONTINUED | OUTPATIENT
Start: 2018-08-29 | End: 2018-08-31

## 2018-08-29 RX ORDER — AMITRIPTYLINE HYDROCHLORIDE 10 MG/1
10 TABLET, FILM COATED ORAL
Status: DISCONTINUED | OUTPATIENT
Start: 2018-08-29 | End: 2018-09-02 | Stop reason: HOSPADM

## 2018-08-29 RX ORDER — SODIUM CHLORIDE 0.9 % (FLUSH) 0.9 %
5-10 SYRINGE (ML) INJECTION AS NEEDED
Status: DISCONTINUED | OUTPATIENT
Start: 2018-08-29 | End: 2018-09-02 | Stop reason: HOSPADM

## 2018-08-29 RX ORDER — LISINOPRIL 5 MG/1
5 TABLET ORAL DAILY
Status: DISCONTINUED | OUTPATIENT
Start: 2018-08-30 | End: 2018-09-02 | Stop reason: HOSPADM

## 2018-08-29 RX ORDER — TRAMADOL HYDROCHLORIDE 50 MG/1
50 TABLET ORAL
Status: DISCONTINUED | OUTPATIENT
Start: 2018-08-29 | End: 2018-09-02 | Stop reason: HOSPADM

## 2018-08-29 RX ORDER — SIMVASTATIN 20 MG/1
20 TABLET, FILM COATED ORAL
Status: DISCONTINUED | OUTPATIENT
Start: 2018-08-29 | End: 2018-08-29

## 2018-08-29 RX ORDER — ISOSORBIDE MONONITRATE 30 MG/1
30 TABLET, EXTENDED RELEASE ORAL DAILY
Status: DISCONTINUED | OUTPATIENT
Start: 2018-08-30 | End: 2018-09-02 | Stop reason: HOSPADM

## 2018-08-29 RX ORDER — LIDOCAINE HYDROCHLORIDE 10 MG/ML
10 INJECTION INFILTRATION; PERINEURAL ONCE
Status: COMPLETED | OUTPATIENT
Start: 2018-08-29 | End: 2018-08-29

## 2018-08-29 RX ORDER — ENOXAPARIN SODIUM 100 MG/ML
40 INJECTION SUBCUTANEOUS EVERY 24 HOURS
Status: DISCONTINUED | OUTPATIENT
Start: 2018-08-29 | End: 2018-09-02 | Stop reason: HOSPADM

## 2018-08-29 RX ORDER — VANCOMYCIN 2 GRAM/500 ML IN 0.9 % SODIUM CHLORIDE INTRAVENOUS
2000 ONCE
Status: COMPLETED | OUTPATIENT
Start: 2018-08-29 | End: 2018-08-29

## 2018-08-29 RX ORDER — SODIUM CHLORIDE 0.9 % (FLUSH) 0.9 %
10 SYRINGE (ML) INJECTION
Status: COMPLETED | OUTPATIENT
Start: 2018-08-29 | End: 2018-08-29

## 2018-08-29 RX ORDER — DIPHENHYDRAMINE HYDROCHLORIDE 50 MG/ML
12.5 INJECTION, SOLUTION INTRAMUSCULAR; INTRAVENOUS
Status: DISCONTINUED | OUTPATIENT
Start: 2018-08-29 | End: 2018-09-02 | Stop reason: HOSPADM

## 2018-08-29 RX ORDER — SODIUM CHLORIDE 0.9 % (FLUSH) 0.9 %
5-10 SYRINGE (ML) INJECTION EVERY 8 HOURS
Status: DISCONTINUED | OUTPATIENT
Start: 2018-08-29 | End: 2018-09-02 | Stop reason: HOSPADM

## 2018-08-29 RX ORDER — PRAMIPEXOLE DIHYDROCHLORIDE 1 MG/1
0.5 TABLET ORAL
Status: DISCONTINUED | OUTPATIENT
Start: 2018-08-29 | End: 2018-09-02 | Stop reason: HOSPADM

## 2018-08-29 RX ORDER — AZITHROMYCIN 250 MG/1
500 TABLET, FILM COATED ORAL EVERY 24 HOURS
Status: DISCONTINUED | OUTPATIENT
Start: 2018-08-29 | End: 2018-09-01

## 2018-08-29 RX ORDER — NITROGLYCERIN 0.4 MG/1
0.4 TABLET SUBLINGUAL
Status: DISCONTINUED | OUTPATIENT
Start: 2018-08-29 | End: 2018-09-02 | Stop reason: HOSPADM

## 2018-08-29 RX ORDER — ONDANSETRON 2 MG/ML
4 INJECTION INTRAMUSCULAR; INTRAVENOUS
Status: DISCONTINUED | OUTPATIENT
Start: 2018-08-29 | End: 2018-09-02 | Stop reason: HOSPADM

## 2018-08-29 RX ORDER — INDOMETHACIN 50 MG/1
50 CAPSULE ORAL
Status: DISCONTINUED | OUTPATIENT
Start: 2018-08-29 | End: 2018-09-02 | Stop reason: HOSPADM

## 2018-08-29 RX ORDER — ACETAMINOPHEN 325 MG/1
650 TABLET ORAL
Status: DISCONTINUED | OUTPATIENT
Start: 2018-08-29 | End: 2018-09-02 | Stop reason: HOSPADM

## 2018-08-29 RX ORDER — BISACODYL 5 MG
5 TABLET, DELAYED RELEASE (ENTERIC COATED) ORAL DAILY PRN
Status: DISCONTINUED | OUTPATIENT
Start: 2018-08-29 | End: 2018-09-02 | Stop reason: HOSPADM

## 2018-08-29 RX ADMIN — INDOMETHACIN 50 MG: 50 CAPSULE ORAL at 18:27

## 2018-08-29 RX ADMIN — CEFTRIAXONE SODIUM 1 G: 1 INJECTION, POWDER, FOR SOLUTION INTRAMUSCULAR; INTRAVENOUS at 17:08

## 2018-08-29 RX ADMIN — AZITHROMYCIN 500 MG: 250 TABLET, FILM COATED ORAL at 17:07

## 2018-08-29 RX ADMIN — PRAMIPEXOLE DIHYDROCHLORIDE 0.5 MG: 1 TABLET ORAL at 22:22

## 2018-08-29 RX ADMIN — Medication 10 ML: at 15:10

## 2018-08-29 RX ADMIN — PIPERACILLIN SODIUM,TAZOBACTAM SODIUM 4.5 G: 4; .5 INJECTION, POWDER, FOR SOLUTION INTRAVENOUS at 12:14

## 2018-08-29 RX ADMIN — IOPAMIDOL 100 ML: 755 INJECTION, SOLUTION INTRAVENOUS at 15:10

## 2018-08-29 RX ADMIN — SODIUM CHLORIDE 1000 ML: 900 INJECTION, SOLUTION INTRAVENOUS at 12:14

## 2018-08-29 RX ADMIN — SODIUM CHLORIDE 125 ML/HR: 900 INJECTION, SOLUTION INTRAVENOUS at 14:17

## 2018-08-29 RX ADMIN — ENOXAPARIN SODIUM 40 MG: 40 INJECTION, SOLUTION INTRAVENOUS; SUBCUTANEOUS at 17:05

## 2018-08-29 RX ADMIN — Medication 10 ML: at 22:28

## 2018-08-29 RX ADMIN — Medication 5 ML: at 22:22

## 2018-08-29 RX ADMIN — LIDOCAINE HYDROCHLORIDE 1 ML: 10 INJECTION, SOLUTION INFILTRATION; PERINEURAL at 21:00

## 2018-08-29 RX ADMIN — SODIUM CHLORIDE 40 MG: 9 INJECTION INTRAMUSCULAR; INTRAVENOUS; SUBCUTANEOUS at 22:22

## 2018-08-29 RX ADMIN — AMITRIPTYLINE HYDROCHLORIDE 10 MG: 10 TABLET, FILM COATED ORAL at 22:23

## 2018-08-29 RX ADMIN — VANCOMYCIN HYDROCHLORIDE 2000 MG: 10 INJECTION, POWDER, LYOPHILIZED, FOR SOLUTION INTRAVENOUS at 13:08

## 2018-08-29 RX ADMIN — SODIUM CHLORIDE 40 MG: 9 INJECTION INTRAMUSCULAR; INTRAVENOUS; SUBCUTANEOUS at 17:14

## 2018-08-29 RX ADMIN — SODIUM CHLORIDE 100 ML: 900 INJECTION, SOLUTION INTRAVENOUS at 15:10

## 2018-08-29 RX ADMIN — Medication 10 ML: at 17:34

## 2018-08-29 NOTE — PROGRESS NOTES
Pt arrived to room 806 via stretcher. Pt ambulated from stretcher to bed with no problem. Pt on 4 L NC with no distress noted at this time. Dual skin assessment completed with Kim Penaloza RN. Pt skin intact, some irritation to rectum from loose stool, skin on sacrum intact. Pt's heels has some redness and is blanchable. Pt on remote telemetry. Pt's wife at the bedside. Pt has no complaints at this time. Pt instructed to call for assistance, call light in reach.

## 2018-08-29 NOTE — H&P
HOSPITALIST H&P/CONSULT 
NAME:  Patricia Henry Age:  78 y.o. 
:   1940 MRN:   [id number] PCP: Aleta Krishna MD 
Consulting MD: Treatment Team: Attending Provider: Elin Carrillo MD 
HPI:  
 
78 M with PMH of CAD s/p stent, GI Bleed, Peptic ulcers, HLD, HTN was sent to the ER by PCP for abnormal Labs and suspected infection and SOB. Pt States SOB started 4 days ago, has been gradually worsening, persistent, worse with activity, better at rest, associated with Pleuritic CP and cough productive of thick blood streaked sputum. Also has had Throat pain and Fever ranged from .7 for the last few days. Pt was recently seen twice at [facility] and was admitted for Chest pain 2/2 Acute Viral Pericarditis (family members had viral infection which was transmitted to pt) and was treated initially with Colchicine, and later NSAID was added on 2nd visit . He has been having diarrhea since started Colchicine. Today was seen at PCP office and strep Throat/Mono resulted -ve. In ER he was hypoxic to 70s and required 4lnc with improved sats. CXR showed LLL Pneumonia. Given Vanc and Zosyn and hospitalist asked to admit. Pt still feels SOB and tired. Wife at bedside also providing history. Prior record reviewed in Care Everywhere, Pt had nl EF bit did develop Moderate Pericardial Effusion. His BP is stable in ER. LA is 2.2, WBC 13.9. Could not take steroids for Pericarditis due to recent GI Bleed. 10 point ROS done and is negative except as noted in HPI. Past Medical History:  
Diagnosis Date  Allergic rhinitis  Aortic insufficiency  Aortic sclerosis   
 no stenosis- ECHO 14  LVEF 50-55%  BPH with urinary obstruction   
 resolved with surgery  CAD (coronary artery disease) stent X1 in . .managed with medications  Chronic pain   
 right shoulder  Depression  Dyslipidemia   
 managed with medications  Former pipe smoker   
 stopped at age 30  GERD (gastroesophageal reflux disease)   
 managed with PRN medications  GI bleed   
 from Plavix  History of depression  Hypercholesterolemia  Hypertension   
 controlled w/med  IBS (irritable bowel syndrome)   
 manged with medications  Macular degeneration   
 managed with medications  Old MI (myocardial infarction) x2; last 9/2014  Osteoarthritis   
 in shoulders and back  PUD (peptic ulcer disease) 3/2011  
 no recent episodes  Restless leg syndrome   
 manged with medications  Seasonal allergic rhinitis PRN medications  Unspecified sleep apnea   
 wears cpap Past Surgical History:  
Procedure Laterality Date  HX CATARACT REMOVAL Bilateral   
 HX COLONOSCOPY    
 HX HEART CATHETERIZATION  2010, 3/2011,4/2011  
 (1)stent 2010  HX HEART CATHETERIZATION  9/2014  
 no stent required  HX SHOULDER ARTHROSCOPY Right 2010 approx  HX SHOULDER REPLACEMENT Right 2015  HX TONSIL AND ADENOIDECTOMY    
 as a child  HX TURP  05/15; 07/15 Prior to Admission Medications Prescriptions Last Dose Informant Patient Reported? Taking?  
acetaminophen (TYLENOL ARTHRITIS PAIN) 650 mg CR tablet   Yes No  
Sig: Take 650 mg by mouth every six (6) hours as needed for Pain. Take / use AM day of surgery  per anesthesia protocols if needed  
amitriptyline (ELAVIL) 10 mg tablet   No No  
Sig: TAKE 1 TABLET ONCE DAILY   FOR GASTROINTESTINAL UPSET PREVENTION/IRRITABLE BOWEL SYNDROME aspirin delayed-release 81 mg tablet   Yes No  
Sig: Take 81 mg by mouth daily. Take / use AM day of surgery  per anesthesia protocols. clopidogrel (PLAVIX) 75 mg tab   No No  
Sig: TAKE 1 TABLET DAILY  
colchicine 0.6 mg tablet   Yes No  
Sig: Take 0.6 mg by mouth. cpap machine kit   Yes No  
Sig: by Does Not Apply route.   
isosorbide mononitrate ER (IMDUR) 30 mg tablet   No No  
Sig: TAKE 1 TABLET DAILY FOR    CHRONIC STABLE ANGINA      PECTORIS  
 isosorbide mononitrate ER (IMDUR) 30 mg tablet   Yes No  
Sig: Take 1 Tab by mouth.  
lisinopril (PRINIVIL, ZESTRIL) 5 mg tablet   No No  
Sig: TAKE 1 TABLET ONCE DAILY   FOR HYPERTENSION  
metoprolol succinate (TOPROL-XL) 100 mg tablet   No No  
Sig: Take 1 tablet by mouth once daily  Indications: hypertension  
nitroglycerin (NITROSTAT) 0.4 mg SL tablet   No No  
Si Tab by SubLINGual route every five (5) minutes as needed for Chest Pain (Last reported use was 4/12/15 approx). Patient taking differently: 0.4 mg by SubLINGual route every five (5) minutes as needed for Chest Pain.  
pantoprazole (PROTONIX) 40 mg tablet   No No  
Sig: Take 1 Tab by mouth two (2) times a day. pramipexole (MIRAPEX) 0.5 mg tablet   No No  
Sig: TAKE 1 TABLET NIGHTLY FOR    RESTLESS LEGS SYNDROME  
simvastatin (ZOCOR) 20 mg tablet   No No  
Sig: Take 1 Tab by mouth nightly. Indications: mixed hyperlipidemia  
traMADol (ULTRAM) 50 mg tablet   Yes No  
Sig: TAKE 1 TABLET BY MOUTH EVERY 4 HOURS AS NEEDED FOR PAIN Facility-Administered Medications: None Home meds reconciled. Allergies Allergen Reactions  Augmentin [Amoxicillin-Pot Clavulanate] Nausea and Vomiting Severe abd cramping Severe abd cramping  Oxycodone Unknown (comments) Sever headaches, abdominal issues Social History Substance Use Topics  Smoking status: Former Smoker Packs/day: 0.00 Years: 0.00  Smokeless tobacco: Never Used Comment: quit smoking pipe 1960s--- no cigs  Alcohol use No  
  
Family History Problem Relation Age of Onset  Heart Disease Mother  High Cholesterol Mother  Hypertension Mother  Coronary Artery Disease Mother  Cancer Father   
  lung cancer--  Heart Disease Brother  Hypertension Brother Immunization History Administered Date(s) Administered  Influenza Vaccine 2014, 2016  Pneumococcal Polysaccharide (PPSV-23) 2015  Td 2014  Tdap 2016 Objective:  
 
Visit Vitals  /85 (BP Patient Position: At rest)  Pulse (!) 101  Temp 99.8 °F (37.7 °C)  Resp 22  
 Ht 5' 8\" (1.727 m)  Wt 74.4 kg (164 lb)  SpO2 94%  BMI 24.94 kg/m2 Temp (24hrs), Av.5 °F (37.5 °C), Min:99.2 °F (37.3 °C), Max:99.8 °F (37.7 °C) Oxygen Therapy O2 Sat (%): 94 % (18 1429) Pulse via Oximetry: 102 beats per minute (18 1429) O2 Device: Nasal cannula (18 1154) O2 Flow Rate (L/min): 4 l/min (18 1154) Physical Exam: 
General:    Alert, cooperative, no distress Head:   NCAT. No obvious deformity Nose:  Nares normal. No drainage Lungs:   B/l crackles Heart:   RRR. No m/r/g. Abdomen:   S/nt/nd. Bowel sounds normal.  
Extremities: No cyanosis. Skin:     No rashes or lesions. Not Jaundiced Neurologic: Moves all extremities. no gross focal deficits Data Review:  
Recent Results (from the past 24 hour(s)) AMB POC COMPLETE CBC,AUTOMATED ENTER Collection Time: 18  9:50 AM  
Result Value Ref Range WBC (POC) 14.6 (A) 4.1 - 10.9 10^3/ul  
 ABS. LYMPHS (POC) 1.9 0.6 - 4.1 10^3/ul Mid # (POC) 0.9 0.0 - 1.8 10^3/ul  
 ABS. GRANS (POC) 11.8 (A) 2.0 - 7.8 10^3/ul LYMPHOCYTES (POC) 13.1 10.0 - 58.5 % MID% POC 5.9 0.1 - 24.0 % GRANULOCYTES (POC) 81.0 37.0 - 92.0 % RBC (POC) 4.76 4.20 - 6.30 10^6/ul HGB (POC) 10.9 (A) 12.0 - 18.0 g/dL HCT (POC) 33.5 (A) 37.0 - 51.0 %  
 MCV (POC) 70.3 (A) 80.0 - 97.0 fL  
 MCH (POC) 22.9 (A) 26.0 - 32.0 pg  
 MCHC (POC) 32.5 31.0 - 36.0 g/dL  
 RDW (POC) 16.6 (A) 11.5 - 14.5 % PLATELET (POC) 996 (A) 140 - 440 10^3/ul MPV (POC) 7.8 0.0 - 49.9 fL  
POC HETEROPHILE ANTIBODY SCREEN Collection Time: 18  9:54 AM  
Result Value Ref Range VALID INTERNAL CONTROL POC Yes   
 Mononucleosis screen (POC) Negative Negative AMB POC RAPID STREP A Collection Time: 18  9:56 AM  
Result Value Ref Range VALID INTERNAL CONTROL POC Yes Group A Strep Ag Negative Negative CBC WITH AUTOMATED DIFF Collection Time: 08/29/18 11:18 AM  
Result Value Ref Range WBC 13.9 (H) 4.3 - 11.1 K/uL  
 RBC 4.79 4.23 - 5.6 M/uL  
 HGB 10.7 (L) 13.6 - 17.2 g/dL HCT 35.1 (L) 41.1 - 50.3 % MCV 73.3 (L) 79.6 - 97.8 FL  
 MCH 22.3 (L) 26.1 - 32.9 PG  
 MCHC 30.5 (L) 31.4 - 35.0 g/dL  
 RDW 18.2 % PLATELET 227 (H) 030 - 450 K/uL MPV 10.9 9.4 - 12.3 FL ABSOLUTE NRBC 0.00 0.0 - 0.2 K/uL  
 DF AUTOMATED NEUTROPHILS 88 (H) 43 - 78 % LYMPHOCYTES 6 (L) 13 - 44 % MONOCYTES 5 4.0 - 12.0 % EOSINOPHILS 0 (L) 0.5 - 7.8 % BASOPHILS 0 0.0 - 2.0 % IMMATURE GRANULOCYTES 1 0.0 - 5.0 %  
 ABS. NEUTROPHILS 12.2 (H) 1.7 - 8.2 K/UL  
 ABS. LYMPHOCYTES 0.8 0.5 - 4.6 K/UL  
 ABS. MONOCYTES 0.7 0.1 - 1.3 K/UL  
 ABS. EOSINOPHILS 0.0 0.0 - 0.8 K/UL  
 ABS. BASOPHILS 0.1 0.0 - 0.2 K/UL  
 ABS. IMM. GRANS. 0.1 0.0 - 0.5 K/UL METABOLIC PANEL, COMPREHENSIVE Collection Time: 08/29/18 11:18 AM  
Result Value Ref Range Sodium 136 136 - 145 mmol/L Potassium 4.0 3.5 - 5.1 mmol/L Chloride 97 (L) 98 - 107 mmol/L  
 CO2 27 21 - 32 mmol/L Anion gap 12 7 - 16 mmol/L Glucose 129 (H) 65 - 100 mg/dL BUN 14 8 - 23 MG/DL Creatinine 0.86 0.8 - 1.5 MG/DL  
 GFR est AA >60 >60 ml/min/1.73m2 GFR est non-AA >60 >60 ml/min/1.73m2 Calcium 9.0 8.3 - 10.4 MG/DL Bilirubin, total 0.9 0.2 - 1.1 MG/DL  
 ALT (SGPT) 69 (H) 12 - 65 U/L  
 AST (SGOT) 72 (H) 15 - 37 U/L Alk. phosphatase 199 (H) 50 - 136 U/L Protein, total 7.6 6.3 - 8.2 g/dL Albumin 2.5 (L) 3.2 - 4.6 g/dL Globulin 5.1 (H) 2.3 - 3.5 g/dL A-G Ratio 0.5 (L) 1.2 - 3.5    
TROPONIN I Collection Time: 08/29/18 11:18 AM  
Result Value Ref Range Troponin-I, Qt. <0.02 (L) 0.02 - 0.05 NG/ML  
PROCALCITONIN Collection Time: 08/29/18 11:18 AM  
Result Value Ref Range Procalcitonin 0.1 ng/mL SED RATE, AUTOMATED Collection Time: 08/29/18 11:18 AM  
Result Value Ref Range Sed rate, automated 24 (H) 0 - 20 mm/hr C REACTIVE PROTEIN, QT Collection Time: 08/29/18 11:18 AM  
Result Value Ref Range C-Reactive protein 21.4 (H) 0.0 - 0.9 mg/dL EKG, 12 LEAD, INITIAL Collection Time: 08/29/18 11:19 AM  
Result Value Ref Range Ventricular Rate 119 BPM  
 Atrial Rate 119 BPM  
 P-R Interval 166 ms  
 QRS Duration 90 ms Q-T Interval 286 ms QTC Calculation (Bezet) 402 ms Calculated P Axis 60 degrees Calculated R Axis 46 degrees Calculated T Axis 95 degrees Diagnosis Sinus tachycardia Septal infarct (cited on or before 19-MAY-2018) Abnormal ECG When compared with ECG of 19-MAY-2018 20:34, 
Vent. rate has increased BY  43 BPM 
Questionable change in initial forces of Septal leads Nonspecific T wave abnormality now evident in Inferior leads Nonspecific T wave abnormality, worse in Lateral leads Confirmed by MARGARETH DURHAM ()Ariadna ([id number]) on 8/29/2018 1:09:38 PM 
  
POC LACTIC ACID Collection Time: 08/29/18 11:23 AM  
Result Value Ref Range Lactic Acid (POC) 2.2 (H) 0.5 - 1.9 mmol/L  
URINALYSIS W/ RFLX MICROSCOPIC Collection Time: 08/29/18  1:11 PM  
Result Value Ref Range Color CARLIE Appearance CLEAR Specific gravity 1.028 (H) 1.001 - 1.023    
 pH (UA) 5.5 5.0 - 9.0 Protein 30 (A) NEG mg/dL Glucose NEGATIVE  mg/dL Ketone 15 (A) NEG mg/dL Bilirubin SMALL (A) NEG Blood NEGATIVE  NEG Urobilinogen 0.2 0.2 - 1.0 EU/dL Nitrites NEGATIVE  NEG Leukocyte Esterase NEGATIVE  NEG    
 WBC 0-3 0 /hpf  
 RBC 5-10 0 /hpf Epithelial cells 0-3 0 /hpf Bacteria 0 0 /hpf Casts 5-10 0 /lpf Imaging /Procedures /Studies: 
I personally reviewed all labs, imaging, and other studies this admission: CXR reviewed by me. LLE PNA with effusion EKG reviewed by me. NSR, no pericarditis related changes. CXR Results  (Last 48 hours) 08/29/18 1142  XR CHEST PA LAT Final result Impression:  IMPRESSION: Left lower lobe consolidation with left pleural effusion Narrative:  Chest 2 view dated 8/29/2018 Comparison 5/19/2018 CLINICAL INFORMATION: Increasing shortness of breath Heart is normal in size and mediastinum unremarkable. There is consolidation  
left lower lobe and a moderate size left pleural effusion. Right lung is clear. CT Results  (Last 48 hours) 08/29/18 1515  CT CHEST W CONT Final result Impression:  Impression: 1. No pulmonary emboli. 2. Lateral pleural effusion. 3. Bilateral infiltrate. 4. Pericardial effusion Narrative:  CT Chest dated  8/29/2018 Clinical Information: Shortness of breath Technique:   
2.5mm axial images of the lungs were  obtained using CT Pulmonary Embolus Protocol. 100 cc Omnipaque 350 used for intravenous contrast to visualize  
pulmonary vessels. Radiation dose reduction techniques were used for this study. Our scanners use  
one or all of the following:  Automated exposure control, adjustment of the mA  
and/or kV according to patient size, iterative reconstruction. Findings: No pulmonary emboli. There are moderate size pleural effusions bilaterally. Areas of infiltrate are present in the upper and lower lobes posteriorly. No  
mediastinal or hilar adenopathy. Atherosclerotic calcification is present in  
normal size thoracic aorta. There is a pericardial effusion. This has a  
thickness of approximately 1.8 cm. Assessment and Plan: Active Hospital Problems Diagnosis Date Noted  Community acquired bacterial pneumonia 08/29/2018  Peptic ulcer disease 08/29/2018  Pericarditis with effusion 08/29/2018  Hemoptysis 08/29/2018  Acute respiratory failure with hypoxia ([address] 08/29/2018  DAVID (obstructive sleep apnea) 11/14/2016  BPH (benign prostatic hyperplasia) 09/09/2015  Restless leg syndrome   
  manged with medications  Hypertension   
  controlled w/med  IBS (irritable bowel syndrome)   
  manged with medications  Hypercholesterolemia PLAN 
 
· Admit to inpt remote tele bed. · Pt has Acute resp failure with hypoxia with cough, hemoptysis and fever due to Community acquired PNA. Denies Chest pain but only has pleuritic pain with cough and SOB. Does not appear to be in Tamponade. · Resp Failure/CAP/Hemoptysis: Will get CT chest to r/o PE and assess Lung architecture as well as Pericardial effususion. Start on CAP Rx, Nebs, O2. Follow Cultures. Will consult Pulm given Pleural effusion and resp failure. May need Tap. · Pericardial Effusion: Will get Limited TTE to assess Effusion and EF. Cards consult if needed tomorrow. · Pericarditis: Will start Indomethacin and Hold Colchicine. Add PPI IV BID. EKG not impressive for pericarditis. Monitor on tele. · Peptic Ulcers: On BID IV PPI 
· DAVID: Home CPAP. 
· HTN/HLD: Resume home meds FEN:  Cardiac diet DVT ppx:  lovenox Code status:  FULL Estimated LOS:  2-3 days Risk assessment:  HIGH risk pt. Plan of care discussed with: patient and wife at bedside. Signed By: Nannette Bennett MD   
 August 29, 2018

## 2018-08-29 NOTE — CONSULTS
CONSULT NOTE    Patricia Henry    8/29/2018    Date of Admission:  8/29/2018    The patient's chart is reviewed and the patient is discussed with the staff. Subjective:     Patient is a 78 y.o.  male seen and evaluated at the request of Dr. Elin Carrillo. Patient with CAD s/p stent, recent Peptic ulcers, HLD, HTN came in tonight since has been having chest pain/pressures with dyspnea which has not improved since 2 hospitalization at [facility]. He reported was told had bout of pericarditis, likely viral, after seeing her daughter in [place] whose 3 small children had viral infection. Did get treatment was colchine and ibuprofen starting 8/4 and then discharged. He reported the chest pain and discomfort did not improve and went back on 8/14 and again given colchine and ibuprofrem. The reports that pain both stabbign and pressure only slightly bettter but was short of breath and came here. Her noted wbc and felt had pneumonia started abx. Had CT done nand given hypoxemia asked if we would addist with care. Patient does have a remote history of pipe smoking 16 years but quit over 30 years ago. Prior record reviewed in Care Everywhere, Pt had nl EF bit did develop Moderate Pericardial Effusion. His BP is stable in ER. LA is 2.2, WBC 13.9.  Could not take steroids for Pericarditis due to recent GI Bleed.     Review of Systems:  -Fever  -Headaches  +Chest pain  +Dyspnea, +wheezing, +cough  +Abdominal pain, -constipation  -Leg swelling  All other organ systems grossly normal.    Patient Active Problem List   Diagnosis Code    Allergic rhinitis J30.9    Hypercholesterolemia E78.00    Hypertension I10    Restless leg syndrome G25.81    IBS (irritable bowel syndrome) K58.9    BPH (benign prostatic hyperplasia) N40.0    Arthritis of shoulder region, right, degenerative M19.011    Coronary artery disease involving native coronary artery of native heart without angina pectoris I25.10  DAVID (obstructive sleep apnea) G47.33    Upper GI bleed K92.2    Acute blood loss anemia D62    Community acquired bacterial pneumonia J15.9    Peptic ulcer disease K27.9    Pericarditis with effusion I31.9    Hemoptysis R04.2    Acute respiratory failure with hypoxia (HCC) J96.01    Pleural effusion, right J90    Pleural effusion, left J90    Hypoxemia R09.02    Thrush B37.0          Prior to Admission Medications   Prescriptions Last Dose Informant Patient Reported? Taking?   acetaminophen (TYLENOL ARTHRITIS PAIN) 650 mg CR tablet   Yes No   Sig: Take 650 mg by mouth every six (6) hours as needed for Pain. Take / use AM day of surgery  per anesthesia protocols if needed   amitriptyline (ELAVIL) 10 mg tablet   No No   Sig: TAKE 1 TABLET ONCE DAILY   FOR GASTROINTESTINAL UPSET PREVENTION/IRRITABLE BOWEL SYNDROME   aspirin delayed-release 81 mg tablet   Yes No   Sig: Take 81 mg by mouth daily. Take / use AM day of surgery  per anesthesia protocols. clopidogrel (PLAVIX) 75 mg tab   No No   Sig: TAKE 1 TABLET DAILY   colchicine 0.6 mg tablet   Yes No   Sig: Take 0.6 mg by mouth three (3) times daily. cpap machine kit   Yes No   Sig: by Does Not Apply route. isosorbide mononitrate ER (IMDUR) 30 mg tablet   No No   Sig: TAKE 1 TABLET DAILY FOR    CHRONIC STABLE ANGINA      PECTORIS   isosorbide mononitrate ER (IMDUR) 30 mg tablet   Yes No   Sig: Take 1 Tab by mouth every morning. lisinopril (PRINIVIL, ZESTRIL) 5 mg tablet   No No   Sig: TAKE 1 TABLET ONCE DAILY   FOR HYPERTENSION   metoprolol succinate (TOPROL-XL) 100 mg tablet   No No   Sig: Take 1 tablet by mouth once daily  Indications: hypertension   nitroglycerin (NITROSTAT) 0.4 mg SL tablet   No No   Si Tab by SubLINGual route every five (5) minutes as needed for Chest Pain (Last reported use was 4/12/15 approx).    Patient taking differently: 0.4 mg by SubLINGual route every five (5) minutes as needed for Chest Pain.   pantoprazole (PROTONIX) 40 mg tablet   No No   Sig: Take 1 Tab by mouth two (2) times a day. pramipexole (MIRAPEX) 0.5 mg tablet   No No   Sig: TAKE 1 TABLET NIGHTLY FOR    RESTLESS LEGS SYNDROME   traMADol (ULTRAM) 50 mg tablet   Yes No   Sig: TAKE 1 TABLET BY MOUTH EVERY 4 HOURS AS NEEDED FOR PAIN      Facility-Administered Medications: None       Past Medical History:   Diagnosis Date    Allergic rhinitis     Aortic insufficiency     Aortic sclerosis     no stenosis- ECHO 9/8/14  LVEF 50-55%    BPH with urinary obstruction     resolved with surgery    CAD (coronary artery disease)     stent X1 in 2010. .managed with medications    Chronic pain     right shoulder    Depression     Dyslipidemia     managed with medications    Former pipe smoker     stopped at age 30    GERD (gastroesophageal reflux disease)     managed with PRN medications    GI bleed     from Plavix    History of depression     Hypercholesterolemia     Hypertension     controlled w/med    IBS (irritable bowel syndrome)     manged with medications    Macular degeneration     managed with medications    Old MI (myocardial infarction)     x2; last 9/2014    Osteoarthritis     in shoulders and back    PUD (peptic ulcer disease) 3/2011    no recent episodes    Restless leg syndrome     manged with medications    Seasonal allergic rhinitis     PRN medications    Unspecified sleep apnea     wears cpap     Past Surgical History:   Procedure Laterality Date    HX CATARACT REMOVAL Bilateral     HX COLONOSCOPY      HX HEART CATHETERIZATION  2010, 3/2011,4/2011    (1)stent 2010     HX HEART CATHETERIZATION  9/2014    no stent required    HX SHOULDER ARTHROSCOPY Right 2010 approx    HX SHOULDER REPLACEMENT Right 2015    HX TONSIL AND ADENOIDECTOMY      as a child    HX TURP  05/15; 07/15     Social History     Social History    Marital status:      Spouse name: N/A    Number of children: N/A    Years of education: N/A Occupational History    Not on file.      Social History Main Topics    Smoking status: Former Smoker     Packs/day: 0.00     Years: 0.00    Smokeless tobacco: Never Used      Comment: quit smoking pipe --- no cigs    Alcohol use No    Drug use: No    Sexual activity: Not on file     Other Topics Concern    Not on file     Social History Narrative     Family History   Problem Relation Age of Onset    Heart Disease Mother     High Cholesterol Mother     Hypertension Mother     Coronary Artery Disease Mother     Cancer Father      lung cancer--    Heart Disease Brother     Hypertension Brother      Allergies   Allergen Reactions    Augmentin [Amoxicillin-Pot Clavulanate] Nausea and Vomiting     Severe abd cramping  Severe abd cramping    Oxycodone Unknown (comments)     Sever headaches, abdominal issues       Current Facility-Administered Medications   Medication Dose Route Frequency    sodium chloride (NS) flush 5-10 mL  5-10 mL IntraVENous Q8H    sodium chloride (NS) flush 5-10 mL  5-10 mL IntraVENous PRN    acetaminophen (TYLENOL) tablet 650 mg  650 mg Oral Q4H PRN    diphenhydrAMINE (BENADRYL) injection 12.5 mg  12.5 mg IntraVENous Q4H PRN    ondansetron (ZOFRAN) injection 4 mg  4 mg IntraVENous Q4H PRN    bisacodyl (DULCOLAX) tablet 5 mg  5 mg Oral DAILY PRN    enoxaparin (LOVENOX) injection 40 mg  40 mg SubCUTAneous Q24H    cefTRIAXone (ROCEPHIN) 1 g in 0.9% sodium chloride (MBP/ADV) 50 mL  1 g IntraVENous Q24H    azithromycin (ZITHROMAX) tablet 500 mg  500 mg Oral Q24H    pantoprazole (PROTONIX) 40 mg in sodium chloride 0.9% 10 mL injection  40 mg IntraVENous Q12H    0.9% sodium chloride infusion  125 mL/hr IntraVENous CONTINUOUS    indomethacin (INDOCIN) capsule 50 mg  50 mg Oral TID WITH MEALS    amitriptyline (ELAVIL) tablet 10 mg  10 mg Oral QHS    [START ON 2018] clopidogrel (PLAVIX) tablet 75 mg  75 mg Oral DAILY    [START ON 2018] isosorbide mononitrate ER (IMDUR) tablet 30 mg  30 mg Oral DAILY    [START ON 8/30/2018] lisinopril (PRINIVIL, ZESTRIL) tablet 5 mg  5 mg Oral DAILY    nitroglycerin (NITROSTAT) tablet 0.4 mg  0.4 mg SubLINGual Q5MIN PRN    pramipexole (MIRAPEX) tablet 0.5 mg  0.5 mg Oral QHS    traMADol (ULTRAM) tablet 50 mg  50 mg Oral Q6H PRN    lidocaine (XYLOCAINE) 10 mg/mL (1 %) injection 1 mL  10 mg IntraDERMal ONCE         Objective:     Vitals:    08/29/18 1359 08/29/18 1429 08/29/18 1616 08/29/18 1925   BP: 143/65 143/68 123/85 135/76   Pulse: (!) 104 (!) 101 (!) 101 (!) 101   Resp: 23  22 22   Temp:   99.8 °F (37.7 °C) 99 °F (37.2 °C)   SpO2: 95% 94%  95%   Weight:       Height:           PHYSICAL EXAM     Constitutional:  the patient is well developed and in no acute distress  EENMT:  Sclera clear, pupils equal, oral mucosa moist  Respiratory: decreased sounds in bases R > left and more dullness to percussion 1/2 up both sides of chest.   Cardiovascular:  RRR without M,G,R  Gastrointestinal: soft and non-tender; with positive bowel sounds. Musculoskeletal: warm without cyanosis. There is no lower leg edema. Skin:  no jaundice or rashes, no wounds   Neurologic: no gross neuro deficits     Psychiatric:  alert and oriented x 3    CT chest:  Noted to have b/l effusion left more than right side. Impression:    1. No pulmonary emboli. 2. Lateral pleural effusion. 3. Bilateral infiltrate. 4. Pericardial effusion              Recent Labs      08/29/18   1118   WBC  13.9*   HGB  10.7*   HCT  35.1*   PLT  635*     Recent Labs      08/29/18   1118   NA  136   K  4.0   CL  97*   GLU  129*   CO2  27   BUN  14   CREA  0.86   CA  9.0   TROIQ  <0.02*   ALB  2.5*   TBILI  0.9   ALT  69*   SGOT  72*     No results for input(s): PH, PCO2, PO2, HCO3 in the last 72 hours.   Recent Labs      08/29/18   1632   LAC  0.9       Assessment:  (Medical Decision Making)     Hospital Problems  Date Reviewed: 8/29/2018          Codes Class Noted POA * (Principal)Acute respiratory failure with hypoxia [facility]) ICD-10-CM: J96.01  ICD-9-CM: 518.81  8/29/2018 Yes    --likely from pericardial effusion +-pericarditis    Community acquired bacterial pneumonia ICD-10-CM: J15.9  ICD-9-CM: 482.9  8/29/2018 Yes    --feel more likely pleural effusion vs infiltrates on CT    Pericarditis with effusion ICD-10-CM: I31.9  ICD-9-CM: 423.9  8/29/2018 Yes    --reported and did have 2 bouts per patient. Pleural effusion, right ICD-10-CM: J90  ICD-9-CM: 511.9  8/29/2018 Unknown        Pleural effusion, left ICD-10-CM: J90  ICD-9-CM: 511.9  8/29/2018 Unknown        Hypoxemia ICD-10-CM: R09.02  ICD-9-CM: 799.02  8/29/2018 Unknown    On oxygen at 4 LPM    Thrush ICD-10-CM: B37.0  ICD-9-CM: 112.0  8/29/2018 Unknown        Peptic ulcer disease ICD-10-CM: K27.9  ICD-9-CM: 533.90  8/29/2018 Yes    Recent history. DAVID (obstructive sleep apnea) ICD-10-CM: G47.33  ICD-9-CM: 327.23  11/14/2016 Yes        BPH (benign prostatic hyperplasia) ICD-10-CM: N40.0  ICD-9-CM: 600.00  9/9/2015 Yes        Hypertension ICD-10-CM: I10  ICD-9-CM: 401.9  Unknown Yes    Overview Signed 8/21/2015  8:46 AM by Zada Mancilla     controlled w/ med             Restless leg syndrome ICD-10-CM: G25.81  ICD-9-CM: 333.94  Unknown Yes    Overview Signed 8/21/2015  8:52 AM by Zada Mancilla     manged with medications             IBS (irritable bowel syndrome) ICD-10-CM: K58.9  ICD-9-CM: 564.1  Unknown Yes    Overview Signed 8/21/2015  8:52 AM by Zayogi Mancilla     manged with medications                   Plan:  (Medical Decision Making)     --cxr noted with b/l effusion. Will plan to tap both chest and send samples for analysis. --get cardiology to see patient, wants to know if needs to continue the treatment for pericarditis or not.   --has thrush in mouth and unclear etiology --> did not get get steroids, abx, immunosuppressives before today. Currently getting abx now.  Will check HIV status and start magic mouth wash. Not if cxr has resolution of ?infiltrartes then consider stopping abx. procal is 0.1.   --get Echo in AM to check pericardial effusion. Should have better image when pleural effusion is cleared. --taper oxygen as tolerated. --continue remaining treatment. More than 50% of the time documented was spent in face-to-face contact with the patient and in the care of the patient on the floor/unit where the patient is located. Thank you very much for this referral.  We appreciate the opportunity to participate in this patient's care. Will follow along with above stated plan.     Bean Pedersen MD

## 2018-08-29 NOTE — ED PROVIDER NOTES
HPI Comments: Patient is a 74-year-old male was referred to the emergency department today from his primary care physician's office for further evaluation. Patient states that about a month ago he went to the hospital at [facility] with some chest pain and was diagnosed with pericarditis. He states he was seen there on 2 visits and treated with colchicine. He has continued to take that medication but having an overall decline in his health for the past month. He now is having persistent fevers and chills with increased shortness of breath. He went to his primary care doctor today and was found to be tachycardic and hypoxic. They checked blood work and sent him here for further evaluation. He also complains of a sore throat for the past month. Patient is a 78 y.o. male presenting with shortness of breath. The history is provided by the patient and the spouse. Shortness of Breath This is a chronic problem. The average episode lasts 4 weeks. The problem has been gradually worsening. Associated symptoms include a fever, sore throat, cough and sputum production. Pertinent negatives include no wheezing, no abdominal pain, no rash, no leg pain and no leg swelling. He has had prior hospitalizations. He has had prior ED visits. He has had no prior ICU admissions. Associated medical issues include CAD and past MI. Past Medical History:  
Diagnosis Date  Allergic rhinitis  Aortic insufficiency  Aortic sclerosis   
 no stenosis- ECHO 9/8/14  LVEF 50-55%  BPH with urinary obstruction   
 resolved with surgery  CAD (coronary artery disease) stent X1 in 2010. .managed with medications  Chronic pain   
 right shoulder  Depression  Dyslipidemia   
 managed with medications  Former pipe smoker   
 stopped at age 30  GERD (gastroesophageal reflux disease)   
 managed with PRN medications  GI bleed   
 from Plavix  History of depression  Hypercholesterolemia  Hypertension   
 controlled w/med  IBS (irritable bowel syndrome)   
 manged with medications  Macular degeneration   
 managed with medications  Old MI (myocardial infarction) x2; last 2014  Osteoarthritis   
 in shoulders and back  PUD (peptic ulcer disease) 3/2011  
 no recent episodes  Restless leg syndrome   
 manged with medications  Seasonal allergic rhinitis PRN medications  Unspecified sleep apnea   
 wears cpap Past Surgical History:  
Procedure Laterality Date  HX CATARACT REMOVAL Bilateral   
 HX COLONOSCOPY    
 HX HEART CATHETERIZATION  , 3/2011,2011  
 (1)stent   HX HEART CATHETERIZATION  2014  
 no stent required  HX SHOULDER ARTHROSCOPY Right  approx  HX SHOULDER REPLACEMENT Right 2015  HX TONSIL AND ADENOIDECTOMY    
 as a child  HX TURP  05/15; 07/15 Family History:  
Problem Relation Age of Onset  Heart Disease Mother  High Cholesterol Mother  Hypertension Mother  Coronary Artery Disease Mother  Cancer Father   
  lung cancer--  Heart Disease Brother  Hypertension Brother Social History Social History  Marital status:  Spouse name: N/A  
 Number of children: N/A  
 Years of education: N/A Occupational History  Not on file. Social History Main Topics  Smoking status: Former Smoker Packs/day: 0.00 Years: 0.00  Smokeless tobacco: Never Used Comment: quit smoking pipe --- no cigs  Alcohol use No  
 Drug use: No  
 Sexual activity: Not on file Other Topics Concern  Not on file Social History Narrative ALLERGIES: Augmentin [amoxicillin-pot clavulanate] and Oxycodone Review of Systems Constitutional: Positive for chills and fever. HENT: Positive for sore throat. Eyes: Negative. Respiratory: Positive for cough, sputum production and shortness of breath. Negative for wheezing. Cardiovascular: Negative for leg swelling. Gastrointestinal: Negative for abdominal pain. Endocrine: Negative. Genitourinary: Negative. Musculoskeletal: Negative. Skin: Negative for rash. Neurological: Negative. Vitals:  
 08/29/18 1147 08/29/18 1148 08/29/18 1153 08/29/18 1154 BP: 129/73 Pulse: (!) 119 (!) 118 (!) 120 Resp: 24 25 28 Temp:      
SpO2:  (!) 88% 93% 92% Weight:      
Height:      
      
 
Physical Exam  
Constitutional: He is oriented to person, place, and time. He appears well-developed. Ill appearing HENT:  
Head: Normocephalic. Mouth/Throat: Mucous membranes are dry. Posterior oropharyngeal erythema present. No oropharyngeal exudate or tonsillar abscesses. Neck: Normal range of motion. Neck supple. Cardiovascular: Tachycardia present. Pulmonary/Chest:  
Rhonchi over the left base Abdominal: Soft. There is no tenderness. There is no rebound and no guarding. Musculoskeletal: Normal range of motion. He exhibits no edema. Lymphadenopathy:  
  He has no cervical adenopathy. Neurological: He is alert and oriented to person, place, and time. Skin: There is pallor. Nursing note and vitals reviewed. MDM Number of Diagnoses or Management Options Diagnosis management comments: 12:13 PM 
EKG shows sinus tachycardia rate 119 with no acute ischemic changes Lactate is elevated at 2.2. Placed on 4 L nasal cannula oxygen and saturation moved up to 93%. White blood cell count is elevated at 13.9 with a left shift. On chest x-ray he does have a left lower lobe infiltrate with an effusion. Cultures have been tainted obtained and broad-spectrum antibiotics for sepsis 7 ordered including vancomycin and Zosyn. I consulted with the hospitalist for admission and further management. Voice dictation software was used during the making of this note.   This software is not perfect and grammatical and other typographical errors may be present. This note has been proofread, but may still contain errors. Mattie Maire MD; 8/29/2018 @12:16 PM  
=================================================================== Amount and/or Complexity of Data Reviewed Clinical lab tests: ordered and reviewed Tests in the radiology section of CPT®: ordered and reviewed Review and summarize past medical records: yes Independent visualization of images, tracings, or specimens: yes Risk of Complications, Morbidity, and/or Mortality Presenting problems: moderate Diagnostic procedures: moderate Management options: moderate Patient Progress Patient progress: stable ED Course Procedures

## 2018-08-29 NOTE — ED TRIAGE NOTES
Pt states he was sent by his pcp because he has been getting treated for pericarditis since August 4th. Pt was diagnosed at [facility]. Pt also states he is having a sore throat. Pt had an elevated white count at the md office. Pt also states having shortness of breath and it hurts to breath.

## 2018-08-29 NOTE — IP AVS SNAPSHOT
Marisa Drakebird 
 
 
 [address] [facility] [address] [facility] 
[phone] Patient: Sunny Randle MRN: [id number]:[id number] About your hospitalization You were admitted on:  August 29, 2018 You last received care in the:  [facility] You were discharged on:  September 2, 2018 Why you were hospitalized Your primary diagnosis was:  Acute Respiratory Failure With Hypoxia (Hcc) Your diagnoses also included:  Community Acquired Bacterial Pneumonia, Clint (Obstructive Sleep Apnea), Hypertension, Bph (Benign Prostatic Hyperplasia), Peptic Ulcer Disease, Pericarditis With Effusion, Hemoptysis, Pleural Effusion, Right, Pleural Effusion, Left, Hypoxemia, Thrush Follow-up Information Follow up With Details Comments Contact Info Julian Reese MD  please call on tuesday and schedule a follow up appointment for 1 week out [address] [facility] [address] 
[phone] Debi Munoz MD  follow up as scheduled  [place] [address] [address] [facility] [address] 
[phone] Jerod Doss MD  please follow up with cardiology in 1 to 2 weeks  [facility] [address] [facility] [address] 
[phone] Your Scheduled Appointments Tuesday September 04, 2018 11:00 AM EDT  
(Arrive by 10:40 AM) Return appointment with Cora Richardson NP  
[address] ([facility]) [facility] [address] [place] [address] [facility]  
[phone] Wednesday September 05, 2018  8:50 AM EDT  
LAB with PST LAB [address] ([address]) [address] [facility] [address]  
[phone] Wednesday October 17, 2018  9:30 AM EDT Office Visit with Julian Reese MD  
[address] ([address]) [address] [facility] [address]  
[phone] Discharge Orders None A check jose david indicates which time of day the medication should be taken. My Medications START taking these medications Instructions Each Dose to Equal  
 Morning Noon Evening Bedtime  
 amoxicillin 875 mg tablet Commonly known as:  AMOXIL Your next dose is: Take as directed Take 1 Tab by mouth two (2) times a day for 7 days. 875 mg  
    
   
   
   
  
 indomethacin 50 mg capsule Commonly known as:  INDOCIN Take 1 Cap by mouth three (3) times daily (with meals) for 90 days. 50 mg  
    
   
  
   
   
  
 ondansetron 8 mg disintegrating tablet Commonly known as:  ZOFRAN ODT Your next dose is: Take on as needed schedule Take 1 Tab by mouth every eight (8) hours as needed for Nausea. 8 mg CHANGE how you take these medications Instructions Each Dose to Equal  
 Morning Noon Evening Bedtime  
 colchicine 0.6 mg tablet Commonly known as:  COLCRYS What changed:  when to take this Take 1 Tab by mouth two (2) times a day for 30 days. 0.6 mg  
    
  
   
   
   
  
 isosorbide mononitrate ER 30 mg tablet Commonly known as:  IMDUR What changed:  Another medication with the same name was removed. Continue taking this medication, and follow the directions you see here. TAKE 1 TABLET DAILY FOR    CHRONIC STABLE ANGINA      PECTORIS  
     
  
   
   
   
  
 nitroglycerin 0.4 mg SL tablet Commonly known as:  NITROSTAT What changed:  reasons to take this Your next dose is: Take on as needed schedule 1 Tab by SubLINGual route every five (5) minutes as needed for Chest Pain (Last reported use was 4/12/15 approx). 0.4 mg  
    
   
   
   
  
 traMADol 50 mg tablet Commonly known as:  ULTRAM  
What changed:  See the new instructions. Your next dose is: Take on as needed schedule Take 1 Tab by mouth every six (6) hours as needed.  Max Daily Amount: 200 mg.  
 50 mg  
    
   
 CONTINUE taking these medications Instructions Each Dose to Equal  
 Morning Noon Evening Bedtime  
 amitriptyline 10 mg tablet Commonly known as:  ELAVIL Your next dose is: Take as directed TAKE 1 TABLET ONCE DAILY   FOR GASTROINTESTINAL UPSET PREVENTION/IRRITABLE BOWEL SYNDROME aspirin delayed-release 81 mg tablet Your next dose is: Take as directed Take 81 mg by mouth daily. Take / use AM day of surgery  per anesthesia protocols. 81 mg  
    
   
   
   
  
 clopidogrel 75 mg Tab Commonly known as:  PLAVIX Your next dose is: Take as directed TAKE 1 TABLET DAILY  
     
   
   
   
  
 cpap machine kit Your next dose is: Take as directed   
   
 by Does Not Apply route. lisinopril 5 mg tablet Commonly known as:  PRINIVIL, ZESTRIL  
   
 TAKE 1 TABLET ONCE DAILY   FOR HYPERTENSION  
     
   
   
   
  
 metoprolol succinate 100 mg tablet Commonly known as:  TOPROL-XL Your next dose is: Take as directed Take 1 tablet by mouth once daily  Indications: hypertension  
     
  
   
   
   
  
 pantoprazole 40 mg tablet Commonly known as:  PROTONIX Take 1 Tab by mouth two (2) times a day. 40 mg  
    
   
   
  
   
  
 pramipexole 0.5 mg tablet Commonly known as:  MIRAPEX TAKE 1 TABLET NIGHTLY FOR    RESTLESS LEGS SYNDROME  
     
  
   
   
   
  
 TYLENOL ARTHRITIS PAIN 650 mg Tber Generic drug:  acetaminophen Your next dose is: Take on as needed schedule Take 650 mg by mouth every six (6) hours as needed for Pain. Take / use AM day of surgery  per anesthesia protocols if needed 650 mg  
    
   
   
   
  
  
STOP taking these medications   
 simvastatin 20 mg tablet Commonly known as:  ZOCOR Where to Get Your Medications Information on where to get these meds will be given to you by the nurse or doctor. ! Ask your nurse or doctor about these medications  
  amoxicillin 875 mg tablet  
 colchicine 0.6 mg tablet  
 indomethacin 50 mg capsule  
 ondansetron 8 mg disintegrating tablet  
 traMADol 50 mg tablet Opioid Education Prescription Opioids: What You Need to Know: 
 
Prescription opioids can be used to help relieve moderate-to-severe pain and are often prescribed following a surgery or injury, or for certain health conditions. These medications can be an important part of treatment but also come with serious risks. Opioids are strong pain medicines. Examples include hydrocodone, oxycodone, fentanyl, and morphine. Heroin is an example of an illegal opioid. It is important to work with your health care provider to make sure you are getting the safest, most effective care. WHAT ARE THE RISKS AND SIDE EFFECTS OF OPIOID USE? Prescription opioids carry serious risks of addiction and overdose, especially with prolonged use. An opioid overdose, often marked by slow breathing, can cause sudden death. The use of prescription opioids can have a number of side effects as well, even when taken as directed. · Tolerance-meaning you might need to take more of a medication for the same pain relief · Physical dependence-meaning you have symptoms of withdrawal when the medication is stopped. Withdrawal symptoms can include nausea, sweating, chills, diarrhea, stomach cramps, and muscle aches. Withdrawal can last up to several weeks, depending on which drug you took and how long you took it. · Increased sensitivity to pain · Constipation · Nausea, vomiting, and dry mouth · Sleepiness and dizziness · Confusion · Depression · Low levels of testosterone that can result in lower sex drive, energy, and strength · Itching and sweating RISKS ARE GREATER WITH:      
· History of drug misuse, substance use disorder, or overdose · Mental health conditions (such as depression or anxiety) · Sleep apnea · Older age (72 years or older) · Pregnancy Avoid alcohol while taking prescription opioids. Also, unless specifically advised by your health care provider, medications to avoid include: · Benzodiazepines (such as Xanax or Valium) · Muscle relaxants (such as Soma or Flexeril) · Hypnotics (such as Ambien or Lunesta) · Other prescription opioids KNOW YOUR OPTIONS Talk to your health care provider about ways to manage your pain that don't involve prescription opioids. Some of these options may actually work better and have fewer risks and side effects. Options may include: 
· Pain relievers such as acetaminophen, ibuprofen, and naproxen · Some medications that are also used for depression or seizures · Physical therapy and exercise · Counseling to help patients learn how to cope better with triggers of pain and stress. · Application of heat or cold compress · Massage therapy · Relaxation techniques Be Informed Make sure you know the name of your medication, how much and how often to take it, and its potential risks & side effects. IF YOU ARE PRESCRIBED OPIOIDS FOR PAIN: 
· Never take opioids in greater amounts or more often than prescribed. Remember the goal is not to be pain-free but to manage your pain at a tolerable level. · Follow up with your primary care provider to: · Work together to create a plan on how to manage your pain. · Talk about ways to help manage your pain that don't involve prescription opioids. · Talk about any and all concerns and side effects. · Help prevent misuse and abuse. · Never sell or share prescription opioids · Help prevent misuse and abuse. · Store prescription opioids in a secure place and out of reach of others (this may include visitors, children, friends, and family).  
· Safely dispose of unused/unwanted prescription opioids: Find your community drug take-back program or your pharmacy mail-back program, or flush them down the toilet, following guidance from the Food and Drug Administration (www.fda.gov/Drugs/ResourcesForYou). · Visit www.cdc.gov/drugoverdose to learn about the risks of opioid abuse and overdose. · If you believe you may be struggling with addiction, tell your health care provider and ask for guidance or call [employer] at 5-354-475-OWWM. Discharge Instructions Follow up with pulmonary in 2 weeks as scheduled. Follow up with Cardiology in 1-2 weeks as scheduled. Follow up with PCP in 1 week. Learning About Pleural Effusion What is pleural effusion? Pleural effusion (say \"PLER-jacoby fx-POMI-deof\") is the buildup of fluid in the space between tissues lining the lungs and chest wall. Because of the fluid buildup, the lungs may not be able to expand completely, which can make it hard to breathe. The lung, or part of it, may collapse. Pleural effusion has many causes, such as pneumonia, cancer, inflammation of the tissues around the lungs, and heart failure. Pleural effusion is usually diagnosed with an X-ray and a physical exam. The doctor listens to the air flow in your lungs. What are the symptoms? Symptoms of pleural effusion may include: · Trouble breathing. · Shortness of breath. · Chest pain. · Fever. · A cough. Minor pleural effusion may not cause any symptoms. How is pleural effusion treated? Doctors may need to treat the condition that is causing pleural effusion. For example, you may get medicines to treat pneumonia or congestive heart failure. Minor pleural effusion often goes away on its own without treatment. Removing fluid Pleural effusion can be treated by removing fluid from the space between the tissues around the lungs. This is done with a needle that's put into the chest (thoracentesis).  A small amount of the fluid may be sent to a lab to find out what is causing the buildup of fluid. Removing the fluid may help to relieve symptoms, such as shortness of breath and chest pain. It can help the lungs to expand more fully. In some cases, if pleural effusion doesn't get better, a catheter may be placed in the chest. This is a flexible tube that allows fluid to drain from the lungs. The catheter stays in the chest until the doctor removes it. Some people may get a treatment that removes the fluid and then puts a medicine into the chest cavity. This helps to prevent too much fluid from building up again. Follow-up care is a key part of your treatment and safety. Be sure to make and go to all appointments, and call your doctor if you are having problems. It's also a good idea to know your test results and keep a list of the medicines you take. Where can you learn more? Go to http://lisa-aranza.info/. Enter A920 in the search box to learn more about \"Learning About Pleural Effusion. \" Current as of: December 6, 2017 Content Version: 11.7 © 4551-8364 [employer]. Care instructions adapted under license by [employer] (which disclaims liability or warranty for this information). If you have questions about a medical condition or this instruction, always ask your healthcare professional. [address] any warranty or liability for your use of this information. Shortness of Breath: Care Instructions Your Care Instructions Shortness of breath has many causes. Sometimes conditions such as anxiety can lead to shortness of breath. Some people get mild shortness of breath when they exercise. Trouble breathing also can be a symptom of a serious problem, such as asthma, lung disease, emphysema, heart problems, and pneumonia. If your shortness of breath continues, you may need tests and treatment. Watch for any changes in your breathing and other symptoms. Follow-up care is a key part of your treatment and safety. Be sure to make and go to all appointments, and call your doctor if you are having problems. It's also a good idea to know your test results and keep a list of the medicines you take. How can you care for yourself at home? · Do not smoke or allow others to smoke around you. If you need help quitting, talk to your doctor about stop-smoking programs and medicines. These can increase your chances of quitting for good. · Get plenty of rest and sleep. · Take your medicines exactly as prescribed. Call your doctor if you think you are having a problem with your medicine. · Find healthy ways to deal with stress. ¨ Exercise daily. ¨ Get plenty of sleep. ¨ Eat regularly and well. When should you call for help? Call 911 anytime you think you may need emergency care. For example, call if: 
  · You have severe shortness of breath.  
  · You have symptoms of a heart attack. These may include: ¨ Chest pain or pressure, or a strange feeling in the chest. 
¨ Sweating. ¨ Shortness of breath. ¨ Nausea or vomiting. ¨ Pain, pressure, or a strange feeling in the back, neck, jaw, or upper belly or in one or both shoulders or arms. ¨ Lightheadedness or sudden weakness. ¨ A fast or irregular heartbeat. After you call 911, the  may tell you to chew 1 adult-strength or 2 to 4 low-dose aspirin. Wait for an ambulance. Do not try to drive yourself.  
 Call your doctor now or seek immediate medical care if: 
  · Your shortness of breath gets worse or you start to wheeze. Wheezing is a high-pitched sound when you breathe.  
  · You wake up at night out of breath or have to prop your head up on several pillows to breathe.  
  · You are short of breath after only light activity or while at rest.  
 Watch closely for changes in your health, and be sure to contact your doctor if: 
  · You do not get better over the next 1 to 2 days. Where can you learn more? Go to http://lisa-aranza.info/. Enter S780 in the search box to learn more about \"Shortness of Breath: Care Instructions. \" Current as of: December 6, 2017 Content Version: 11.7 © 7254-3317 [employer]. Care instructions adapted under license by [employer] (which disclaims liability or warranty for this information). If you have questions about a medical condition or this instruction, always ask your healthcare professional. [address] any warranty or liability for your use of this inform DISCHARGE SUMMARY from Nurse PATIENT INSTRUCTIONS: 
 
 
F-face looks uneven A-arms unable to move or move unevenly S-speech slurred or non-existent T-time-call 911 as soon as signs and symptoms begin-DO NOT go Back to bed or wait to see if you get better-TIME IS BRAIN. Warning Signs of HEART ATTACK Call 911 if you have these symptoms: 
? Chest discomfort. Most heart attacks involve discomfort in the center of the chest that lasts more than a few minutes, or that goes away and comes back. It can feel like uncomfortable pressure, squeezing, fullness, or pain. ? Discomfort in other areas of the upper body. Symptoms can include pain or discomfort in one or both arms, the back, neck, jaw, or stomach. ? Shortness of breath with or without chest discomfort. ? Other signs may include breaking out in a cold sweat, nausea, or lightheadedness. Don't wait more than five minutes to call [address]! Fast action can save your life.  Calling 911 is almost always the fastest way to get lifesaving treatment. Emergency Medical Services staff can begin treatment when they arrive  up to an hour sooner than if someone gets to the hospital by car. The discharge information has been reviewed with the patient. The patient verbalized understanding. Discharge medications reviewed with the patient and appropriate educational materials and side effects teaching were provided. ___________________________________________________________________________________________________________________________________ ACO Transitions of Care Introducing [employer] [address] offers a voluntary care coordination program to provide high quality service and care to [facility] fee-for-service beneficiaries. Rossana Sun was designed to help you enhance your health and well-being through the following services: ? Transitions of Care  support for individuals who are transitioning from one care setting to another (example: Hospital to home). ? Chronic and Complex Care Coordination  support for individuals and caregivers of those with serious or chronic illnesses or with more than one chronic (ongoing) condition and those who take a number of different medications. If you meet specific medical criteria, a [facility] [address] may call you directly to coordinate your care with your primary care physician and your other care providers. For questions about the [facility] programs, please, contact your physicians office. For general questions or additional information about Accountable Care Organizations: 
Please visit www.medicare.gov/acos. html or call 1-800-MEDICARE (7-859.869.4395) TTY users should call 3-450.656.3504. [employer] Announcement We are excited to announce that we are making your provider's discharge notes available to you in [employer].   You will see these notes when they are completed and signed by the physician that discharged you from your recent hospital stay. If you have any questions or concerns about any information you see in [employer], please call the Health Information Department where you were seen or reach out to your Primary Care Provider for more information about your plan of care. Introducing \[facility]\"" & HEALTH SERVICES! Deacindy Stock: Thank you for requesting a [employer] account. Our records indicate that you already have an active [employer] account. You can access your account anytime at [email or web address] Did you know that you can access your hospital and ER discharge instructions at any time in [employer]? You can also review all of your test results from your hospital stay or ER visit. Additional Information If you have questions, please visit the Frequently Asked Questions section of the [employer] website at [email or web address] Remember, [employer] is NOT to be used for urgent needs. For medical emergencies, dial 911. Now available from your iPhone and Android! Introducing José Antonio Rico As a [employer] patient, I wanted to make you aware of our electronic visit tool called José Antonio GraceTiggly. [employer] 24/7 allows you to connect within minutes with a medical provider 24 hours a day, seven days a week via a mobile device or tablet or logging into a secure website from your computer. You can access José Antonio Rico from anywhere in the [place]. A virtual visit might be right for you when you have a simple condition and feel like you just dont want to get out of bed, or cant get away from work for an appointment, when your regular [employer] provider is not available (evenings, weekends or holidays), or when youre out of town and need minor care.   Electronic visits cost only $49 and if the José Antonio Rico provider determines a prescription is needed to treat your condition, one can be electronically transmitted to a nearby pharmacy*. Please take a moment to enroll today if you have not already done so. The enrollment process is free and takes just a few minutes. To enroll, please download the [employer] 24/7 alexia to your tablet or phone, or visit [email or web address] to enroll on your computer. And, as an [address] [place] [address] patient with a [employer] account, the results of your visits will be scanned into your electronic medical record and your primary care provider will be able to view the scanned results. We urge you to continue to see your regular [employer] provider for your ongoing medical care. And while your primary care provider may not be the one available when you seek a [employer] virtual visit, the peace of mind you get from getting a real diagnosis real time can be priceless. For more information on [employer], view our Frequently Asked Questions (FAQs) at [email or web address]. Sincerely, 
 
Jennifer Yang MD 
Chief Medical Officer [facility] *:  certain medications cannot be prescribed via [employer] Unresulted Labs-Please follow up with your PCP about these lab tests Order Current Status AFB CULTURE + SMEAR W/RFLX ID FROM CULTURE Preliminary result CULTURE, BLOOD Preliminary result CULTURE, BLOOD Preliminary result FUNGUS, CULTURE, MISC SOURCE Preliminary result Providers Seen During Your Hospitalization Provider Specialty Primary office phone Satnam Nunez MD Emergency Medicine [phone] Fadumo Mcguire MD Internal Medicine [phone] Your Primary Care Physician (PCP) Primary Care Physician Office Phone Office Fax Mariposa Boss [phone] You are allergic to the following Allergen Reactions Augmentin (Amoxicillin-Pot Clavulanate) Nausea and Vomiting Severe abd cramping Severe abd cramping Oxycodone Unknown (comments) Sever headaches, abdominal issues Recent Documentation Height Weight BMI Smoking Status 1.727 m 77.3 kg 25.91 kg/m2 Former Smoker Emergency Contacts Name Discharge Info Relation Home Work Mobile Colleen Vasquez  Spouse [3] [phone] BishopErin olivo  Other Relative [6]   [phone] Patient Belongings The following personal items are in your possession at time of discharge: 
  Dental Appliances: None  Visual Aid: Glasses, With patient      Home Medications: None   Jewelry: None  Clothing: Pants, Shirt    Other Valuables: None Please provide this summary of care documentation to your next provider. Signatures-by signing, you are acknowledging that this After Visit Summary has been reviewed with you and you have received a copy. Patient Signature:  ____________________________________________________________ Date:  ____________________________________________________________  
  
Carlee Ríos Provider Signature:  ____________________________________________________________ Date:  ____________________________________________________________

## 2018-08-29 NOTE — PROGRESS NOTES
made initial visit. Pt was alert and verbal.  Pt appeared comfortable with no pain level expressed or observed. Pt's wife was present for visit.  welcomed them to DT and shared information about  services.  provided spiritual care through presence, pastoral conversation, and assurance of prayer.

## 2018-08-29 NOTE — ED NOTES
TRANSFER - OUT REPORT: 
 
Verbal report given to Neda RN(name) on [facility]  being transferred to [facility](unit) for routine progression of care Report consisted of patients Situation, Background, Assessment and  
Recommendations(SBAR). Information from the following report(s) SBAR, Kardex, ED Summary, [place] and Recent Results was reviewed with the receiving nurse. Lines:    
 
Opportunity for questions and clarification was provided. Patient transported with: 
 [employer]

## 2018-08-30 ENCOUNTER — APPOINTMENT (OUTPATIENT)
Dept: GENERAL RADIOLOGY | Age: 78
DRG: 177 | End: 2018-08-30
Attending: INTERNAL MEDICINE
Payer: MEDICARE

## 2018-08-30 LAB
ANION GAP SERPL CALC-SCNC: 9 MMOL/L (ref 7–16)
BASOPHILS # BLD: 0 K/UL (ref 0–0.2)
BASOPHILS NFR BLD: 0 % (ref 0–2)
BUN SERPL-MCNC: 9 MG/DL (ref 8–23)
CALCIUM SERPL-MCNC: 8 MG/DL (ref 8.3–10.4)
CHLORIDE SERPL-SCNC: 105 MMOL/L (ref 98–107)
CO2 SERPL-SCNC: 27 MMOL/L (ref 21–32)
CREAT SERPL-MCNC: 0.58 MG/DL (ref 0.8–1.5)
CRP SERPL-MCNC: 14.4 MG/DL (ref 0–0.9)
DIFFERENTIAL METHOD BLD: ABNORMAL
EOSINOPHIL # BLD: 0.1 K/UL (ref 0–0.8)
EOSINOPHIL NFR BLD: 2 % (ref 0.5–7.8)
ERYTHROCYTE [DISTWIDTH] IN BLOOD BY AUTOMATED COUNT: 17.8 %
ERYTHROCYTE [SEDIMENTATION RATE] IN BLOOD: 62 MM/HR (ref 0–20)
GLUCOSE FLD-MCNC: 157 MG/DL
GLUCOSE SERPL-MCNC: 104 MG/DL (ref 65–100)
HCT VFR BLD AUTO: 29.1 % (ref 41.1–50.3)
HGB BLD-MCNC: 8.9 G/DL (ref 13.6–17.2)
IMM GRANULOCYTES # BLD: 0.1 K/UL (ref 0–0.5)
IMM GRANULOCYTES NFR BLD AUTO: 1 % (ref 0–5)
LDH SERPL L TO P-CCNC: 139 U/L (ref 110–210)
LYMPHOCYTES # BLD: 1.1 K/UL (ref 0.5–4.6)
LYMPHOCYTES NFR BLD: 14 % (ref 13–44)
MCH RBC QN AUTO: 22.5 PG (ref 26.1–32.9)
MCHC RBC AUTO-ENTMCNC: 30.6 G/DL (ref 31.4–35)
MCV RBC AUTO: 73.5 FL (ref 79.6–97.8)
MONOCYTES # BLD: 0.7 K/UL (ref 0.1–1.3)
MONOCYTES NFR BLD: 9 % (ref 4–12)
NEUTS SEG # BLD: 5.8 K/UL (ref 1.7–8.2)
NEUTS SEG NFR BLD: 75 % (ref 43–78)
NRBC # BLD: 0 K/UL (ref 0–0.2)
PLATELET # BLD AUTO: 452 K/UL (ref 150–450)
PMV BLD AUTO: 10.7 FL (ref 9.4–12.3)
POTASSIUM SERPL-SCNC: 3.5 MMOL/L (ref 3.5–5.1)
PROT FLD-MCNC: 3.9 G/DL
RBC # BLD AUTO: 3.96 M/UL (ref 4.23–5.6)
SODIUM SERPL-SCNC: 141 MMOL/L (ref 136–145)
SPECIMEN SOURCE FLD: NORMAL
SPECIMEN SOURCE FLD: NORMAL
WBC # BLD AUTO: 7.8 K/UL (ref 4.3–11.1)

## 2018-08-30 PROCEDURE — 74011000258 HC RX REV CODE- 258: Performed by: INTERNAL MEDICINE

## 2018-08-30 PROCEDURE — 77030019605

## 2018-08-30 PROCEDURE — 93306 TTE W/DOPPLER COMPLETE: CPT

## 2018-08-30 PROCEDURE — 85652 RBC SED RATE AUTOMATED: CPT

## 2018-08-30 PROCEDURE — 71045 X-RAY EXAM CHEST 1 VIEW: CPT

## 2018-08-30 PROCEDURE — 83615 LACTATE (LD) (LDH) ENZYME: CPT

## 2018-08-30 PROCEDURE — 94760 N-INVAS EAR/PLS OXIMETRY 1: CPT

## 2018-08-30 PROCEDURE — 80048 BASIC METABOLIC PNL TOTAL CA: CPT

## 2018-08-30 PROCEDURE — 77030027138 HC INCENT SPIROMETER -A

## 2018-08-30 PROCEDURE — 85025 COMPLETE CBC W/AUTO DIFF WBC: CPT

## 2018-08-30 PROCEDURE — 74011250637 HC RX REV CODE- 250/637: Performed by: INTERNAL MEDICINE

## 2018-08-30 PROCEDURE — 97530 THERAPEUTIC ACTIVITIES: CPT

## 2018-08-30 PROCEDURE — 74011000250 HC RX REV CODE- 250: Performed by: INTERNAL MEDICINE

## 2018-08-30 PROCEDURE — 77030020263 HC SOL INJ SOD CL0.9% LFCR 1000ML

## 2018-08-30 PROCEDURE — 36415 COLL VENOUS BLD VENIPUNCTURE: CPT

## 2018-08-30 PROCEDURE — 74011250636 HC RX REV CODE- 250/636: Performed by: INTERNAL MEDICINE

## 2018-08-30 PROCEDURE — 86140 C-REACTIVE PROTEIN: CPT

## 2018-08-30 PROCEDURE — 99232 SBSQ HOSP IP/OBS MODERATE 35: CPT | Performed by: INTERNAL MEDICINE

## 2018-08-30 PROCEDURE — 77010033678 HC OXYGEN DAILY

## 2018-08-30 PROCEDURE — 65660000000 HC RM CCU STEPDOWN

## 2018-08-30 PROCEDURE — 86038 ANTINUCLEAR ANTIBODIES: CPT

## 2018-08-30 PROCEDURE — 97161 PT EVAL LOW COMPLEX 20 MIN: CPT

## 2018-08-30 PROCEDURE — C9113 INJ PANTOPRAZOLE SODIUM, VIA: HCPCS | Performed by: INTERNAL MEDICINE

## 2018-08-30 PROCEDURE — 74011250636 HC RX REV CODE- 250/636: Performed by: HOSPITALIST

## 2018-08-30 PROCEDURE — 74011250637 HC RX REV CODE- 250/637: Performed by: NURSE PRACTITIONER

## 2018-08-30 RX ORDER — COLCHICINE 0.6 MG/1
0.6 TABLET ORAL 2 TIMES DAILY
Status: DISCONTINUED | OUTPATIENT
Start: 2018-08-30 | End: 2018-09-02 | Stop reason: HOSPADM

## 2018-08-30 RX ADMIN — SODIUM CHLORIDE 125 ML/HR: 900 INJECTION, SOLUTION INTRAVENOUS at 01:37

## 2018-08-30 RX ADMIN — CEFTRIAXONE SODIUM 1 G: 1 INJECTION, POWDER, FOR SOLUTION INTRAMUSCULAR; INTRAVENOUS at 11:51

## 2018-08-30 RX ADMIN — SODIUM CHLORIDE 125 ML/HR: 900 INJECTION, SOLUTION INTRAVENOUS at 09:52

## 2018-08-30 RX ADMIN — PRAMIPEXOLE DIHYDROCHLORIDE 0.5 MG: 1 TABLET ORAL at 21:11

## 2018-08-30 RX ADMIN — Medication 10 ML: at 21:15

## 2018-08-30 RX ADMIN — Medication 10 ML: at 05:39

## 2018-08-30 RX ADMIN — INDOMETHACIN 50 MG: 50 CAPSULE ORAL at 16:47

## 2018-08-30 RX ADMIN — CLOPIDOGREL BISULFATE 75 MG: 75 TABLET ORAL at 09:55

## 2018-08-30 RX ADMIN — ONDANSETRON 4 MG: 2 INJECTION INTRAMUSCULAR; INTRAVENOUS at 07:06

## 2018-08-30 RX ADMIN — Medication 5 ML: at 11:48

## 2018-08-30 RX ADMIN — SODIUM CHLORIDE 40 MG: 9 INJECTION INTRAMUSCULAR; INTRAVENOUS; SUBCUTANEOUS at 09:52

## 2018-08-30 RX ADMIN — SODIUM CHLORIDE 75 ML/HR: 900 INJECTION, SOLUTION INTRAVENOUS at 21:51

## 2018-08-30 RX ADMIN — INDOMETHACIN 50 MG: 50 CAPSULE ORAL at 09:55

## 2018-08-30 RX ADMIN — AMITRIPTYLINE HYDROCHLORIDE 10 MG: 10 TABLET, FILM COATED ORAL at 21:10

## 2018-08-30 RX ADMIN — ONDANSETRON 4 MG: 2 INJECTION INTRAMUSCULAR; INTRAVENOUS at 16:47

## 2018-08-30 RX ADMIN — COLCHICINE 0.6 MG: 0.6 TABLET, FILM COATED ORAL at 21:10

## 2018-08-30 RX ADMIN — INDOMETHACIN 50 MG: 50 CAPSULE ORAL at 11:36

## 2018-08-30 RX ADMIN — ISOSORBIDE MONONITRATE 30 MG: 30 TABLET, EXTENDED RELEASE ORAL at 09:54

## 2018-08-30 RX ADMIN — Medication 5 ML: at 06:00

## 2018-08-30 RX ADMIN — ENOXAPARIN SODIUM 40 MG: 40 INJECTION, SOLUTION INTRAVENOUS; SUBCUTANEOUS at 13:31

## 2018-08-30 RX ADMIN — Medication 5 ML: at 16:51

## 2018-08-30 RX ADMIN — COLCHICINE 0.6 MG: 0.6 TABLET, FILM COATED ORAL at 13:31

## 2018-08-30 RX ADMIN — Medication 10 ML: at 13:32

## 2018-08-30 RX ADMIN — ONDANSETRON 4 MG: 2 INJECTION INTRAMUSCULAR; INTRAVENOUS at 11:34

## 2018-08-30 RX ADMIN — SODIUM CHLORIDE 40 MG: 9 INJECTION INTRAMUSCULAR; INTRAVENOUS; SUBCUTANEOUS at 21:09

## 2018-08-30 RX ADMIN — DIPHENHYDRAMINE HYDROCHLORIDE 12.5 MG: 50 INJECTION, SOLUTION INTRAMUSCULAR; INTRAVENOUS at 02:05

## 2018-08-30 RX ADMIN — LISINOPRIL 5 MG: 5 TABLET ORAL at 09:54

## 2018-08-30 RX ADMIN — AZITHROMYCIN 500 MG: 250 TABLET, FILM COATED ORAL at 13:31

## 2018-08-30 NOTE — PROGRESS NOTES
Janae Salgado Admission Date: 8/29/2018 Daily Progress Note: 8/30/2018 The patient's chart is reviewed and the patient is discussed with the staff. Pt is a 77 yo  male with a history of CAD, HTN, HLD, PUD, and recent pericarditis after visiting her daughter in [place] whose 3 small children had viral illnesses. She was hospitalized at [facility] x 2 (8/4 and 8/14)and treated with colchicine and ibuprofen. Pt presented to the ER on 8/29 with acute respiratory failure and LLL pneumonia and bilateral pleural effusions. He was admitted and we were consulted to assist. Pt underwent L thoracentesis with 1100 mL removed. Only a small R pleural effusion noted which was too small to tap. Subjective:  
 
Pt lying in bed on 1L O2. Pt reports that he feels much better today compared to 2 days ago. He has had \"hot flashes\" since pericarditis diagnosed. He has had a dry cough. He has an IS at bedside but it has not been opened. We discussed importance of use. Pt just had echo but it hasn't been read. Cardiology consulted. Current Facility-Administered Medications Medication Dose Route Frequency  sodium chloride (NS) flush 5-10 mL  5-10 mL IntraVENous Q8H  
 sodium chloride (NS) flush 5-10 mL  5-10 mL IntraVENous PRN  
 acetaminophen (TYLENOL) tablet 650 mg  650 mg Oral Q4H PRN  
 diphenhydrAMINE (BENADRYL) injection 12.5 mg  12.5 mg IntraVENous Q4H PRN  
 ondansetron (ZOFRAN) injection 4 mg  4 mg IntraVENous Q4H PRN  
 bisacodyl (DULCOLAX) tablet 5 mg  5 mg Oral DAILY PRN  
 enoxaparin (LOVENOX) injection 40 mg  40 mg SubCUTAneous Q24H  cefTRIAXone (ROCEPHIN) 1 g in 0.9% sodium chloride (MBP/ADV) 50 mL  1 g IntraVENous Q24H  
 azithromycin (ZITHROMAX) tablet 500 mg  500 mg Oral Q24H  pantoprazole (PROTONIX) 40 mg in sodium chloride 0.9% 10 mL injection  40 mg IntraVENous Q12H  
 0.9% sodium chloride infusion  125 mL/hr IntraVENous CONTINUOUS  
  indomethacin (INDOCIN) capsule 50 mg  50 mg Oral TID WITH MEALS  
 amitriptyline (ELAVIL) tablet 10 mg  10 mg Oral QHS  clopidogrel (PLAVIX) tablet 75 mg  75 mg Oral DAILY  isosorbide mononitrate ER (IMDUR) tablet 30 mg  30 mg Oral DAILY  lisinopril (PRINIVIL, ZESTRIL) tablet 5 mg  5 mg Oral DAILY  nitroglycerin (NITROSTAT) tablet 0.4 mg  0.4 mg SubLINGual Q5MIN PRN  pramipexole (MIRAPEX) tablet 0.5 mg  0.5 mg Oral QHS  traMADol (ULTRAM) tablet 50 mg  50 mg Oral Q6H PRN  
 magic mouthwash (JOSÉ) oral suspension 5 mL  5 mL Oral Q6H Review of Systems 
+cough 
+dyspnea 
+hot flashes Constitutional: negative for fever Cardiovascular: negative for chest pain, palpitations, syncope, edema Gastrointestinal:  negative for dysphagia, reflux, vomiting, diarrhea, abdominal pain, or melena Neurologic:  negative for focal weakness, numbness, headache Objective:  
 
Vitals:  
 08/29/18 9014 08/30/18 0210 08/30/18 2552 08/30/18 9853 BP: 115/66 129/85 147/76 Pulse: 82 87 88 Resp: 20 20 20 Temp: 97.8 °F (36.6 °C) 98 °F (36.7 °C) 98.8 °F (37.1 °C) SpO2: 97% 96%  96% Weight:      
Height:      
 
Intake and Output:  
08/28 1901 - 08/30 0700 In: 864 [P.O.:240; I.V.:624] Out: -  
08/30 0701 - 08/30 1900 In: 360 [P.O.:360] Out: - Physical Exam:  
Constitution:  the patient is well developed and in no acute distress, on 1L O2 
EENMT:  Sclera clear, pupils equal, oral mucosa moist 
Respiratory: diminished bilateral bases Cardiovascular:  RRR without M,G,R 
Gastrointestinal: soft and non-tender; with positive bowel sounds. Musculoskeletal: warm without cyanosis. There is no lower leg edema. Skin:  no jaundice or rashes Neurologic: no gross neuro deficits Psychiatric:  alert and oriented x 3 CXR: 8/30/18 8/29-prior to thoracentesis Chest CTA:  
 
LAB No results for input(s): GLUCPOC in the last 72 hours. No lab exists for component: [place] Recent Labs 08/30/18 
 0556  08/29/18 
 1118 WBC  7.8  13.9* HGB  8.9*  10.7* HCT  29.1*  35.1*  
PLT  452*  635* Recent Labs 08/30/18 
 0556  08/29/18 
 1118 NA  141  136  
K  3.5  4.0  
CL  105  97* CO2  27  27 GLU  104*  129* BUN  9  14 CREA  0.58*  0.86 CA  8.0*  9.0  
TROIQ   --   <0.02* ALB   --   2.5* TBILI   --   0.9 ALT   --   69* SGOT   --   72* No results for input(s): PH, PCO2, PO2, HCO3 in the last 72 hours. Recent Labs  
   08/29/18 
 9354 LAC  0.9 Assessment:  (Medical Decision Making) Hospital Problems  Date Reviewed: 8/30/2018 Codes Class Noted POA Community acquired bacterial pneumonia ICD-10-CM: J15.9 ICD-9-CM: 482.9  8/29/2018 Yes On zosyn/rocephin-CXR improved Peptic ulcer disease ICD-10-CM: K27.9 ICD-9-CM: 533.90  8/29/2018 Yes On protonix drip Pericarditis with effusion ICD-10-CM: I31.9 ICD-9-CM: 423.9  8/29/2018 Yes Echo pending, on indomethacin, was on colchicine previously with diarrhea Hemoptysis ICD-10-CM: R04.2 ICD-9-CM: 786.30  8/29/2018 Yes Resolved * (Principal)Acute respiratory failure with hypoxia ([facility] [address] ICD-10-CM: J96.01 
ICD-9-CM: 518.81  8/29/2018 Yes Wean O2 as tolerated, only on 1L Pleural effusion, right ICD-10-CM: J90 ICD-9-CM: 511.9  8/29/2018 Unknown Too small to tap Pleural effusion, left ICD-10-CM: J90 ICD-9-CM: 511.9  8/29/2018 Unknown S/p L thoracentesis with 1100 mL removed Hypoxemia ICD-10-CM: R09.02 
ICD-9-CM: 799.02  8/29/2018 Unknown Wean O2 Thrush ICD-10-CM: B37.0 ICD-9-CM: 112.0  8/29/2018 Unknown Magic mouthwash   
 DAVID (obstructive sleep apnea) ICD-10-CM: G47.33 
ICD-9-CM: 327.23  11/14/2016 Yes Chronic   
 BPH (benign prostatic hyperplasia) ICD-10-CM: N40.0 ICD-9-CM: 600.00  9/9/2015 Yes Hypercholesterolemia ICD-10-CM: E78.00 ICD-9-CM: 272.0  Unknown Yes Hypertension ICD-10-CM: I10 
ICD-9-CM: 401.9  Unknown Yes Overview Signed 8/21/2015  8:46 AM by MayIActiven Pitcher  
  controlled w/med Restless leg syndrome ICD-10-CM: G25.81 ICD-9-CM: 333.94  Unknown Yes Overview Signed 8/21/2015  8:52 AM by Maylon Pitcher  
  manged with medications Chronic IBS (irritable bowel syndrome) ICD-10-CM: K58.9 ICD-9-CM: 564.1  Unknown Yes Overview Signed 8/21/2015  8:52 AM by Maylon Pitcher  
  manged with medications Plan:  (Medical Decision Making)  
 
--wean O2 
--continue zosyn/Rocephin-day 2 
--on indomethacin, not on prednisone secondary to recent GIB with peptic ulcer 
--await echo results --cardiology consult pending More than 50% of the time documented was spent in face-to-face contact with the patient and in the care of the patient on the floor/unit where the patient is located. DOUG Ceja Lungs:  CTA B, no w/r/r. Heart:  RRR with no Murmur/Rubs/Gallops Additional Comments:   
Patient with what looks like exudative effusion (no LDH seen). Could be parapneumonic or related to pericarditis. On abx for PNA. Cardiology addressing pericarditis. Follow up TTE report when available. Begin working with PT today. Try to wean off O2. I have spoken with and examined the patient. I agree with the above assessment and plan as documented.  
 
Anle Mejia MD

## 2018-08-30 NOTE — PROGRESS NOTES
Problem: Falls - Risk of 
Goal: *Absence of Falls Document Virginia Ambrosio Fall Risk and appropriate interventions in the flowsheet. Outcome: Progressing Towards Goal 
Fall Risk Interventions: 
  
 
  
 
Medication Interventions: Teach patient to arise slowly, Patient to call before getting OOB Elimination Interventions: Call light in reach, Patient to call for help with toileting needs, Toileting schedule/hourly rounds

## 2018-08-30 NOTE — PROCEDURES
US Guided Thoracentesis Procedure Note--UNILATERAL    Time Out -- Correct patient identified and Everyone Agrees. For procedure sterile techniques used including: Sterile gown, gloves, cap, mask, drapes and chlorhexidine to the insertions site/location. Procedure:   Left Thoracentesis with ultrasound guidance    Indication:  Left Pleural effusion     Summary:    After informed consent was obtained, the patient was positioned upright in the usual fashion.  Ultrasound was used to identify the optimal spot for pleural drainage.     The left  intercostal space was anesthetized with 1% Lidocaine to achieve adequate anesthesia.  The pleural space was entered with orange/slightly red fluid identified.  Lidocaine was instilled within the pleural space as well.  A small stab incision was made at the site of local anesthesia and the thoracentesis catheter was advanced into the pleural space. Approximately 1100 ml of fluid was obtained with ease.  The procedure was terminated after chest pressure/marked urge to cough. Air was not aspirated during the procedure.  A bandaid was placed over the incision after adequate hemostasis was achieved.  A CXR is pending.     EBL -- 2 drops    Patient stable post procedure    The pleural fluid will be sent for :protein, LDH, cytology, cell count, AFB.     Signed By: Tara Garcia MD

## 2018-08-30 NOTE — PROGRESS NOTES
Problem: Nutrition Deficit Goal: *Optimize nutritional status Nutrition Reason for assessment: Referral received from nursing admission Malnutrition Screening Tool for recently lost 2-13# without trying and eating poorly due to decreased appetite. Assessment:  
Diet order(s): Cardiac Food/Nutrition Patient History:  The patient is noted to have a h/o HTN, IBS, BPH and CAD. The patient reports that his appetite is doing better today than it has in quite a while. He states that over the past month he has been with issues of nausea and diarrhea. He reports early satiety at meal times. He reports some ongoing weight loss. States that at first weight loss was intentional through [employer] and now it is unintentional. The patient states that he is open to supplements and trying those to help replace poor meal intakes. Weight history in the EMR cannot be verified as accurate due to unknown weight source (pt stated vs estimated vs measured). Weight Loss Metrics 8/29/2018 8/29/2018 5/19/2018 5/18/2018 Today's Wt 164 lb 169 lb 3.2 oz 170 lb 170 lb BMI 24.94 kg/m2 25.73 kg/m2 25.85 kg/m2 25.85 kg/m2 Weight Loss Metrics 2/21/2018 1/12/2018 Today's Wt 175 lb 177 lb 6.4 oz BMI 26.61 kg/m2 26.97 kg/m2 According to the EMR the patient is noted to have a potential weight loss of 13# over the past ~7 months. Anthropometrics:Height: 5' 8\" (172.7 cm),  Weight: 74.4 kg (164 lb), Weight Source: unknown, Body mass index is 24.94 kg/(m^2). BMI class of normal weight. Macronutrient needs: EER:  4899-5716 kcal /day (22-27 kcal/kg listed BW) EPR:  70-84 grams protein/day (1-1.2 grams/kg IBW) Intake/Comparative Standards: Average intake for past 2 recorded meal(s): 43%. This potentially meets ~52% of kcal and ~54% of protein needs Nutrition Diagnosis: Unintended weight loss related to decreased appetite, early satiety, nausea and diarrhea as evidenced by patient consuming ~50% of estimated needs at this time. Intervention: 
Meals and snacks: Continue current diet. Nutrition Supplement Therapy: Add Magic Cup BID and change ensure to once daily Nutrition Discharge Plan: too soon to be determined [address] [place] [address], [phone]

## 2018-08-30 NOTE — PROGRESS NOTES
Pt lying in bed, watching tv. Pt is alert and oriented to person, place, time and situation. Pt is on 2 L NC at this time with no distress. Pt requests Zofran for nausea before breakfast. PRN Zofran 4 mg IV given at this time. Call light in reach.

## 2018-08-30 NOTE — PROGRESS NOTES
Pt sitting up in bed, wife at bedside. Pt on 2 L NC a this time with no distress. Pt has no complaints. Call light  In reach

## 2018-08-30 NOTE — PROGRESS NOTES
Hospitalist Progress Note 2018 Admit Date: 2018 11:37 AM  
NAME: Micaela Patiño :  1940 MRN:  [id number] Attending: Ros Spencer MD 
PCP:  Aliyah Barney MD 
 
SUBJECTIVE:  
 
Micaela Patiño is a 78 M with PMH of CAD s/p stent, GI Bleed, Peptic ulcers, HLD, HTN was sent to the ER on  by PCP for abnormal Labs and suspected infection and SOB. Pt was noted to have bilateral pleural effusion L>>R, had removal of 1100 ml of fluid from left. Pt started on rocephin/azithro for LLL CAP, received vanc and zosyn in ER. Recently discharged from [facility] after being treated for acute viral pericarditis, treated initially with colchicine, and later with NSAID. Pulmonary is on board. : 
Pt seen at bedside He reports feeling better than yesterday Denies any further hemoptysis Occasional cough during night No chest pain, fever, chills Pt's wife present at bedside. Review of Systems negative with exception of pertinent positives noted above PHYSICAL EXAM  
 
 
Visit Vitals  /85 (BP 1 Location: Right arm, BP Patient Position: At rest)  Pulse 87  Temp 98 °F (36.7 °C)  Resp 20  
 Ht 5' 8\" (1.727 m)  Wt 74.4 kg (164 lb)  SpO2 96%  BMI 24.94 kg/m2 Temp (24hrs), Av.8 °F (37.1 °C), Min:97.8 °F (36.6 °C), Max:99.8 °F (37.7 °C) Oxygen Therapy O2 Sat (%): 96 % (18 0210) Pulse via Oximetry: 102 beats per minute (18 1429) O2 Device: Nasal cannula (18) O2 Flow Rate (L/min): 2 l/min (18) Intake/Output Summary (Last 24 hours) at 18 1522 Last data filed at 18 Gross per 24 hour Intake              864 ml Output                0 ml Net              864 ml General:                    Alert, cooperative, no distress Head:                                   NCAT. No obvious deformity Nose:                                   Nares normal. No drainage Lungs:                       breath sounds clear on left, diminished on right base, mild bibasilar crackles Heart:                                  RRR. No m/r/g. Abdomen:                  S/nt/nd. Bowel sounds normal.  
Extremities:               No cyanosis. Skin:                                    No rashes or lesions. Not Jaundiced Neurologic:               GCS 15, no motor or sensory deficits, CN 2-12 intact Recent Results (from the past 24 hour(s)) AMB POC COMPLETE CBC,AUTOMATED ENTER Collection Time: 08/29/18  9:50 AM  
Result Value Ref Range WBC (POC) 14.6 (A) 4.1 - 10.9 10^3/ul  
 ABS. LYMPHS (POC) 1.9 0.6 - 4.1 10^3/ul Mid # (POC) 0.9 0.0 - 1.8 10^3/ul  
 ABS. GRANS (POC) 11.8 (A) 2.0 - 7.8 10^3/ul LYMPHOCYTES (POC) 13.1 10.0 - 58.5 % MID% POC 5.9 0.1 - 24.0 % GRANULOCYTES (POC) 81.0 37.0 - 92.0 % RBC (POC) 4.76 4.20 - 6.30 10^6/ul HGB (POC) 10.9 (A) 12.0 - 18.0 g/dL HCT (POC) 33.5 (A) 37.0 - 51.0 %  
 MCV (POC) 70.3 (A) 80.0 - 97.0 fL  
 MCH (POC) 22.9 (A) 26.0 - 32.0 pg  
 MCHC (POC) 32.5 31.0 - 36.0 g/dL  
 RDW (POC) 16.6 (A) 11.5 - 14.5 % PLATELET (POC) 724 (A) 140 - 440 10^3/ul MPV (POC) 7.8 0.0 - 49.9 fL  
POC HETEROPHILE ANTIBODY SCREEN Collection Time: 08/29/18  9:54 AM  
Result Value Ref Range VALID INTERNAL CONTROL POC Yes   
 Mononucleosis screen (POC) Negative Negative AMB POC RAPID STREP A Collection Time: 08/29/18  9:56 AM  
Result Value Ref Range VALID INTERNAL CONTROL POC Yes Group A Strep Ag Negative Negative CBC WITH AUTOMATED DIFF Collection Time: 08/29/18 11:18 AM  
Result Value Ref Range WBC 13.9 (H) 4.3 - 11.1 K/uL  
 RBC 4.79 4.23 - 5.6 M/uL  
 HGB 10.7 (L) 13.6 - 17.2 g/dL HCT 35.1 (L) 41.1 - 50.3 % MCV 73.3 (L) 79.6 - 97.8 FL  
 MCH 22.3 (L) 26.1 - 32.9 PG  
 MCHC 30.5 (L) 31.4 - 35.0 g/dL  
 RDW 18.2 % PLATELET 385 (H) 076 - 450 K/uL MPV 10.9 9.4 - 12.3 FL ABSOLUTE NRBC 0.00 0.0 - 0.2 K/uL DF AUTOMATED NEUTROPHILS 88 (H) 43 - 78 % LYMPHOCYTES 6 (L) 13 - 44 % MONOCYTES 5 4.0 - 12.0 % EOSINOPHILS 0 (L) 0.5 - 7.8 % BASOPHILS 0 0.0 - 2.0 % IMMATURE GRANULOCYTES 1 0.0 - 5.0 %  
 ABS. NEUTROPHILS 12.2 (H) 1.7 - 8.2 K/UL  
 ABS. LYMPHOCYTES 0.8 0.5 - 4.6 K/UL  
 ABS. MONOCYTES 0.7 0.1 - 1.3 K/UL  
 ABS. EOSINOPHILS 0.0 0.0 - 0.8 K/UL  
 ABS. BASOPHILS 0.1 0.0 - 0.2 K/UL  
 ABS. IMM. GRANS. 0.1 0.0 - 0.5 K/UL METABOLIC PANEL, COMPREHENSIVE Collection Time: 08/29/18 11:18 AM  
Result Value Ref Range Sodium 136 136 - 145 mmol/L Potassium 4.0 3.5 - 5.1 mmol/L Chloride 97 (L) 98 - 107 mmol/L  
 CO2 27 21 - 32 mmol/L Anion gap 12 7 - 16 mmol/L Glucose 129 (H) 65 - 100 mg/dL BUN 14 8 - 23 MG/DL Creatinine 0.86 0.8 - 1.5 MG/DL  
 GFR est AA >60 >60 ml/min/1.73m2 GFR est non-AA >60 >60 ml/min/1.73m2 Calcium 9.0 8.3 - 10.4 MG/DL Bilirubin, total 0.9 0.2 - 1.1 MG/DL  
 ALT (SGPT) 69 (H) 12 - 65 U/L  
 AST (SGOT) 72 (H) 15 - 37 U/L Alk. phosphatase 199 (H) 50 - 136 U/L Protein, total 7.6 6.3 - 8.2 g/dL Albumin 2.5 (L) 3.2 - 4.6 g/dL Globulin 5.1 (H) 2.3 - 3.5 g/dL A-G Ratio 0.5 (L) 1.2 - 3.5    
TROPONIN I Collection Time: 08/29/18 11:18 AM  
Result Value Ref Range Troponin-I, Qt. <0.02 (L) 0.02 - 0.05 NG/ML  
PROCALCITONIN Collection Time: 08/29/18 11:18 AM  
Result Value Ref Range Procalcitonin 0.1 ng/mL SED RATE, AUTOMATED Collection Time: 08/29/18 11:18 AM  
Result Value Ref Range Sed rate, automated 24 (H) 0 - 20 mm/hr C REACTIVE PROTEIN, QT Collection Time: 08/29/18 11:18 AM  
Result Value Ref Range C-Reactive protein 21.4 (H) 0.0 - 0.9 mg/dL EKG, 12 LEAD, INITIAL Collection Time: 08/29/18 11:19 AM  
Result Value Ref Range Ventricular Rate 119 BPM  
 Atrial Rate 119 BPM  
 P-R Interval 166 ms  
 QRS Duration 90 ms Q-T Interval 286 ms QTC Calculation (Bezet) 402 ms Calculated P Axis 60 degrees Calculated R Axis 46 degrees Calculated T Axis 95 degrees Diagnosis Sinus tachycardia Septal infarct (cited on or before 19-MAY-2018) Abnormal ECG When compared with ECG of 19-MAY-2018 20:34, 
Vent. rate has increased BY  43 BPM 
Questionable change in initial forces of Septal leads Nonspecific T wave abnormality now evident in Inferior leads Nonspecific T wave abnormality, worse in Lateral leads Confirmed by CEBE  MD (), Judit Herrera ([id number]) on 8/29/2018 1:09:38 PM 
  
POC LACTIC ACID Collection Time: 08/29/18 11:23 AM  
Result Value Ref Range Lactic Acid (POC) 2.2 (H) 0.5 - 1.9 mmol/L  
URINALYSIS W/ RFLX MICROSCOPIC Collection Time: 08/29/18  1:11 PM  
Result Value Ref Range Color CARLIE Appearance CLEAR Specific gravity 1.028 (H) 1.001 - 1.023    
 pH (UA) 5.5 5.0 - 9.0 Protein 30 (A) NEG mg/dL Glucose NEGATIVE  mg/dL Ketone 15 (A) NEG mg/dL Bilirubin SMALL (A) NEG Blood NEGATIVE  NEG Urobilinogen 0.2 0.2 - 1.0 EU/dL Nitrites NEGATIVE  NEG Leukocyte Esterase NEGATIVE  NEG    
 WBC 0-3 0 /hpf  
 RBC 5-10 0 /hpf Epithelial cells 0-3 0 /hpf Bacteria 0 0 /hpf Casts 5-10 0 /lpf LACTIC ACID Collection Time: 08/29/18  4:32 PM  
Result Value Ref Range Lactic acid 0.9 0.4 - 2.0 MMOL/L  
CELL COUNT, BODY FLUID Collection Time: 08/29/18  9:45 PM  
Result Value Ref Range BODY FLUID TYPE PLEURAL FLUID FLUID COLOR CARLIE    
 FLUID APPEARANCE CLOUDY FLUID RBC CT. 02592 /cu mm FLUID WBC COUNT 2905 /cu mm  
 FLD NEUTROPHILS 85 % FLD LYMPHS 11 % FLD MONO/MACROPHAGES 4 % WBC COMMENTS MANY LARGE UNDEFINED MONONUCLEAR CELLS    
GLUCOSE, FLUID Collection Time: 08/29/18  9:45 PM  
Result Value Ref Range Fluid Type: PLEURAL FLUID Glucose, body fld. 157 MG/DL PROTEIN TOTAL, FLUID Collection Time: 08/29/18  9:45 PM  
Result Value Ref Range Fluid Type: PLEURAL FLUID Protein total, body fld. 3.9 g/dL METABOLIC PANEL, BASIC Collection Time: 08/30/18  5:56 AM  
Result Value Ref Range Sodium 141 136 - 145 mmol/L Potassium 3.5 3.5 - 5.1 mmol/L Chloride 105 98 - 107 mmol/L  
 CO2 27 21 - 32 mmol/L Anion gap 9 7 - 16 mmol/L Glucose 104 (H) 65 - 100 mg/dL BUN 9 8 - 23 MG/DL Creatinine 0.58 (L) 0.8 - 1.5 MG/DL  
 GFR est AA >60 >60 ml/min/1.73m2 GFR est non-AA >60 >60 ml/min/1.73m2 Calcium 8.0 (L) 8.3 - 10.4 MG/DL  
LD Collection Time: 08/30/18  5:56 AM  
Result Value Ref Range  110 - 210 U/L  
CBC WITH AUTOMATED DIFF Collection Time: 08/30/18  5:56 AM  
Result Value Ref Range WBC 7.8 4.3 - 11.1 K/uL  
 RBC 3.96 (L) 4.23 - 5.6 M/uL HGB 8.9 (L) 13.6 - 17.2 g/dL HCT 29.1 (L) 41.1 - 50.3 % MCV 73.5 (L) 79.6 - 97.8 FL  
 MCH 22.5 (L) 26.1 - 32.9 PG  
 MCHC 30.6 (L) 31.4 - 35.0 g/dL  
 RDW 17.8 % PLATELET 082 (H) 681 - 450 K/uL MPV 10.7 9.4 - 12.3 FL ABSOLUTE NRBC 0.00 0.0 - 0.2 K/uL  
 DF AUTOMATED NEUTROPHILS 75 43 - 78 % LYMPHOCYTES 14 13 - 44 % MONOCYTES 9 4.0 - 12.0 % EOSINOPHILS 2 0.5 - 7.8 % BASOPHILS 0 0.0 - 2.0 % IMMATURE GRANULOCYTES 1 0.0 - 5.0 %  
 ABS. NEUTROPHILS 5.8 1.7 - 8.2 K/UL  
 ABS. LYMPHOCYTES 1.1 0.5 - 4.6 K/UL  
 ABS. MONOCYTES 0.7 0.1 - 1.3 K/UL  
 ABS. EOSINOPHILS 0.1 0.0 - 0.8 K/UL  
 ABS. BASOPHILS 0.0 0.0 - 0.2 K/UL  
 ABS. IMM. GRANS. 0.1 0.0 - 0.5 K/UL Imaging Terelyric Fish Portable chest xray   
  
COMPARISON: August 29, 2018. 
  
CLINICAL HISTORY: Check effusions. 
  
FINDINGS: 
  
Lungs are underinflated. Persistent elevation of the right hemidiaphragm. There 
are bibasilar densities, likely combination of pleural effusion and atelectasis. No pneumothorax or pulmonary edema. Cardiac mediastinal contour and the 
surrounding bones are stable. 
  
IMPRESSION IMPRESSION: 
  
Bilateral pleural effusions and associated atelectasis. ASSESSMENT Hospital Problems as of 8/30/2018  Date Reviewed: 8/29/2018 Codes Class Noted - Resolved POA Community acquired bacterial pneumonia ICD-10-CM: J15.9 ICD-9-CM: 482.9  8/29/2018 - Present Yes Peptic ulcer disease ICD-10-CM: K27.9 ICD-9-CM: 533.90  8/29/2018 - Present Yes Pericarditis with effusion ICD-10-CM: I31.9 ICD-9-CM: 423.9  8/29/2018 - Present Yes Hemoptysis ICD-10-CM: R04.2 ICD-9-CM: 786.30  8/29/2018 - Present Yes * (Principal)Acute respiratory failure with hypoxia ([facility] [address] ICD-10-CM: J96.01 
ICD-9-CM: 518.81  8/29/2018 - Present Yes Pleural effusion, right ICD-10-CM: J90 ICD-9-CM: 511.9  8/29/2018 - Present Unknown Pleural effusion, left ICD-10-CM: J90 ICD-9-CM: 511.9  8/29/2018 - Present Unknown Hypoxemia ICD-10-CM: R09.02 
ICD-9-CM: 799.02  8/29/2018 - Present Unknown Thrush ICD-10-CM: B37.0 ICD-9-CM: 112.0  8/29/2018 - Present Unknown DAVID (obstructive sleep apnea) ICD-10-CM: G47.33 
ICD-9-CM: 327.23  11/14/2016 - Present Yes  
   
 BPH (benign prostatic hyperplasia) ICD-10-CM: N40.0 ICD-9-CM: 600.00  9/9/2015 - Present Yes Hypercholesterolemia ICD-10-CM: E78.00 ICD-9-CM: 272.0  Unknown - Present Yes Hypertension ICD-10-CM: I10 
ICD-9-CM: 401.9  Unknown - Present Yes Overview Signed 8/21/2015  8:46 AM by Burke Bishop  
  controlled w/med Restless leg syndrome ICD-10-CM: G25.81 ICD-9-CM: 333.94  Unknown - Present Yes Overview Signed 8/21/2015  8:52 AM by Burke Bishop  
  manged with medications IBS (irritable bowel syndrome) ICD-10-CM: K58.9 ICD-9-CM: 564.1  Unknown - Present Yes Overview Signed 8/21/2015  8:52 AM by Burke roblero with medications Plan: 
- continue IV azithro and rocephin, D2 
- pleural fluid is exudative, likely para pneumonic effusion. 
- s/p left thoracentesis on 8/29, removal of 1100 cc of fluid. Culture is negative so far - Pulmonary on board, appreciate their input - currently on 1-2 ltrs NC, wean off as able - limited 2d-echo pending, cardio eval pending 
- continue indomethacin for recent viral pericarditis - troponin negative 
- continue other home meds as reconciled in [facility] 
 
DVT Prophylaxis: lovenox Dispo: pending, will likely go home when medically stable Geoff Jaquez MD

## 2018-08-30 NOTE — PROGRESS NOTES
Problem: Mobility Impaired (Adult and Pediatric) Goal: *Acute Goals and Plan of Care (Insert Text) Discharge Goals: 
(1.)Mr. Em Hobbs will tolerated 30 minutes of therapeutic exercises/activities for improved CV endurance for carryover to functional activities within 7 treatment days. (2.)Mr. Em Hobbs will transfer from bed to chair and chair to bed with INDEPENDENT using the least restrictive device within 7 treatment day(s). (3.)Mr. Em Hobbs will ambulate with INDEPENDENT for 500+ feet with the least restrictive device within 7 treatment day(s) while on RA with stable O2 sat for return to PLOF.  
________________________________________________________________________________________________ PHYSICAL THERAPY: Initial Assessment, Treatment Day: Day of Assessment, PM 8/30/2018 INPATIENT: Hospital Day: 2 [place] / Product Type: Medicare /  
  
NAME/AGE/GENDER: Candida Mcleod is a 78 y.o. male PRIMARY DIAGNOSIS: Community acquired bacterial pneumonia Acute respiratory failure with hypoxia ([facility] [address] Acute respiratory failure with hypoxia (HCC) ICD-10: Treatment Diagnosis:  
 · Generalized Muscle Weakness (M62.81) · Difficulty in walking, Not elsewhere classified (R26.2) Precaution/Allergies: 
Augmentin [amoxicillin-pot clavulanate] and Oxycodone ASSESSMENT:  
 
Mr. Em Hobbs is supine in bed upon contact and agreeable to PT evaluation and treatment this afternoon. Pt is A&O X 4 with reports of 0/10 pain prior to mobility. Pt lives with his wife in 1 story home with 1 step to enter. Pt reports independence with gait and ADLs, 0 falls, drives, and lives a fairly active lifestyle. Pt does not require supplemental O2 at baseline. Pt is currently on 1 L with O2 sat at 93% prior to mobility. Pt removed from O2 and transitioned supine to sit EOB independently.  Pt performed STS with SBA-supervision and ambulated 250 ft in hallway with SBA. Pt reported feeling more unsteady than his baseline. Pt returned to room with O2 sat at 89%. Pt returned to 93% on RA with seated rest break. Pt returned to supine in bed independently and left with all needs met and within reach. Nursing notified. Rodriguez Israel will benefit from skilled PT (medically necessary) to address decreased functional tolerance and decreased cardiopulmonary endurance affecting participation in basic ADLs and functional tasks. This section established at most recent assessment PROBLEM LIST (Impairments causing functional limitations): 1. Decreased Strength 2. Decreased ADL/Functional Activities 3. Decreased Ambulation Ability/Technique 4. Decreased Balance 5. Decreased Activity Tolerance 6. Decreased Pacing Skills 7. Increased Fatigue 8. Increased Shortness of Breath 9. Decreased [place] with Home Exercise Program 
 INTERVENTIONS PLANNED: (Benefits and precautions of physical therapy have been discussed with the patient.) 1. Balance Exercise 2. Bed Mobility 3. Family Education 4. Gait Training 5. Home Exercise Program (HEP) 6. Neuromuscular Re-education/Strengthening 7. Therapeutic Activites 8. Therapeutic Exercise/Strengthening 9. Transfer Training TREATMENT PLAN: Frequency/Duration: 3 times a week for duration of hospital stay Rehabilitation Potential For Stated Goals: Excellent RECOMMENDED REHABILITATION/EQUIPMENT: (at time of discharge pending progress): Due to the probability of continued deficits (see above) this patient will not likely need continued skilled physical therapy after discharge. Equipment:  
? None at this time HISTORY:  
History of Present Injury/Illness (Reason for Referral): 
See H&P below 78 M with PMH of CAD s/p stent, GI Bleed, Peptic ulcers, HLD, HTN was sent to the ER by PCP for abnormal Labs and suspected infection and SOB.  Pt States SOB started 4 days ago, has been gradually worsening, persistent, worse with activity, better at rest, associated with Pleuritic CP and cough productive of thick blood streaked sputum. Also has had Throat pain and Fever ranged from .7 for the last few days. Pt was recently seen twice at [facility] and was admitted for Chest pain 2/2 Acute Viral Pericarditis (family members had viral infection which was transmitted to pt) and was treated initially with Colchicine, and later NSAID was added on 2nd visit . He has been having diarrhea since started Colchicine. Today was seen at PCP office and strep Throat/Mono resulted -ve. In ER he was hypoxic to 70s and required 4lnc with improved sats. CXR showed LLL Pneumonia. Given Vanc and Zosyn and hospitalist asked to admit.  
  
Pt still feels SOB and tired. Wife at bedside also providing history. Prior record reviewed in Care Everywhere, Pt had nl EF bit did develop Moderate Pericardial Effusion. His BP is stable in ER. LA is 2.2, WBC 13.9. Could not take steroids for Pericarditis due to recent GI Bleed. 
  
Past Medical History/Comorbidities:  
Mr. Marlin Murry  has a past medical history of Allergic rhinitis; Aortic insufficiency; Aortic sclerosis; BPH with urinary obstruction; CAD (coronary artery disease); Chronic pain; Depression; Dyslipidemia; Former pipe smoker; GERD (gastroesophageal reflux disease); GI bleed; History of depression; Hypercholesterolemia; Hypertension; IBS (irritable bowel syndrome); Macular degeneration; Old MI (myocardial infarction); Osteoarthritis; PUD (peptic ulcer disease) (3/2011); Restless leg syndrome; Seasonal allergic rhinitis; and Unspecified sleep apnea. He also has no past medical history of Adverse effect of anesthesia; Difficult intubation; Malignant hyperthermia due to anesthesia; Nausea & vomiting; or Pseudocholinesterase deficiency.   Mr. Marlin Mrury  has a past surgical history that includes hx cataract removal (Bilateral); hx tonsil and adenoidectomy; hx turp (05/15; 07/15); hx colonoscopy; hx heart catheterization (2010, 3/2011,4/2011); hx heart catheterization (9/2014); hx shoulder arthroscopy (Right, 2010 approx); and hx shoulder replacement (Right, 2015). Social History/Living Environment:  
Home Environment: Private residence # Steps to Enter: 1 One/Two Story Residence: One story Living Alone: No 
Support Systems: Spouse/Significant Other/Partner Patient Expects to be Discharged to[de-identified] Private residence Current DME Used/Available at Home: CPAP Prior Level of Function/Work/Activity: 
Lives with wife, indep with all mobility, 0 falls, drives Number of Personal Factors/Comorbidities that affect the Plan of Care: 3+: HIGH COMPLEXITY EXAMINATION:  
Most Recent Physical Functioning:  
Gross Assessment: 
AROM: Within functional limits Strength: Generally decreased, functional 
Coordination: Within functional limits Posture: 
  
Balance: 
Sitting: Intact; Without support Standing: Intact; Without support Bed Mobility: 
Supine to Sit: Independent Sit to Supine: Independent Wheelchair Mobility: 
  
Transfers: 
Sit to Stand: Stand-by assistance;Supervision Stand to Sit: Stand-by assistance;Supervision Gait: 
  
Base of Support: Narrowed Speed/Alisa: Slow Step Length: Left shortened;Right shortened Gait Abnormalities: Decreased step clearance Distance (ft): 250 Feet (ft) Assistive Device:  (none) Ambulation - Level of Assistance: Stand-by assistance Interventions: Safety awareness training;Verbal cues Body Structures Involved: 1. Heart 2. Lungs 3. Bones 4. Muscles Body Functions Affected: 1. Cardio 2. Respiratory 3. Neuromusculoskeletal 
4. Movement Related Activities and Participation Affected: 1. General Tasks and Demands 2. Mobility 3. Self Care 4. Domestic Life 5. Interpersonal Interactions and Relationships 6. Community, Social and [place] Number of elements that affect the Plan of Care: 4+: HIGH COMPLEXITY CLINICAL PRESENTATION:  
Presentation: Evolving clinical presentation with changing clinical characteristics: MODERATE COMPLEXITY CLINICAL DECISION MAKIN [facility] [address] [address] AM-PAC 6 Clicks Basic Mobility Inpatient Short Form How much difficulty does the patient currently have. .. Unable A Lot A Little None 1. Turning over in bed (including adjusting bedclothes, sheets and blankets)? [] 1   [] 2   [] 3   [x] 4  
2. Sitting down on and standing up from a chair with arms ( e.g., wheelchair, bedside commode, etc.)   [] 1   [] 2   [] 3   [x] 4  
3. Moving from lying on back to sitting on the side of the bed? [] 1   [] 2   [] 3   [x] 4 How much help from another person does the patient currently need. .. Total A Lot A Little None 4. Moving to and from a bed to a chair (including a wheelchair)? [] 1   [] 2   [] 3   [x] 4  
5. Need to walk in hospital room? [] 1   [] 2   [] 3   [x] 4  
6. Climbing 3-5 steps with a railing? [] 1   [] 2   [x] 3   [] 4  
© , Trustees of [facility] [address] [address], under license to [employer]. All rights reserved Score:  Initial: 23 Most Recent: X (Date: -- ) Interpretation of Tool:  Represents activities that are increasingly more difficult (i.e. Bed mobility, Transfers, Gait). Score 24 23 22-20 19-15 14-10 9-7 6 Modifier CH CI CJ CK CL CM CN   
 
? Mobility - Walking and Moving Around:  
  - CURRENT STATUS: CI - 1%-19% impaired, limited or restricted  - GOAL STATUS: CH - 0% impaired, limited or restricted  - D/C STATUS:  ---------------To be determined--------------- [place] / [place] / Product Type: Medicare /   
 
Medical Necessity:    
· Patient is expected to demonstrate progress in strength, balance, coordination and functional technique to decrease assistance required with gait, transfers, and tolerance for functional mobility. Johan Shilldee Reason for Services/Other Comments: 
· Patient continues to require skilled intervention due to decreased functional tolerance and decreased cardiopulmonary endurance affecting participation in basic ADLs and functional tasks. Use of outcome tool(s) and clinical judgement create a POC that gives a: Clear prediction of patient's progress: LOW COMPLEXITY  
  
 
 
 
TREATMENT:  
(In addition to Assessment/Re-Assessment sessions the following treatments were rendered) Pre-treatment Symptoms/Complaints:  None, \"Im feeling better\" Pain: Initial:  
Pain Intensity 1: 0  Post Session:  0/10 In addition to evaluation: 
Therapeutic Activity: (    8 minutes): Therapeutic activities including Bed transfers, Ambulation on level ground and pacing techniques, energy conservation to improve mobility, strength, balance and coordination. Required minimal Safety awareness training;Verbal cues to promote dynamic balance in standing. Braces/Orthotics/Lines/Etc:  
· IV Treatment/Session Assessment:   
· Response to Treatment:  Ambulated 250 ft with SBA, desat to 89% on RA with mobility. · Interdisciplinary Collaboration:  
o Physical Therapist 
o Registered Nurse · After treatment position/precautions:  
o Supine in bed 
o Bed/Chair-wheels locked 
o Bed in low position 
o Call light within reach 
o RN notified 
o Family at bedside · Compliance with Program/Exercises: Will assess as treatment progresses. · Recommendations/Intent for next treatment session: \"Next visit will focus on advancements to more challenging activities and reduction in assistance provided\". Total Treatment Duration: PT Patient Time In/Time Out Time In: 9456 Time Out: 1551 Annamarie Giron [address]

## 2018-08-30 NOTE — PROGRESS NOTES
END OF SHIFT NOTE: 
 
Intake/Output 08/29 1901 - 08/30 0700 In: 624 [I.V.:624] Out: -   
Voiding: YES Catheter: NO 
Drain:   
 
 
 
 
 
Stool:  1 occurrences. Stool Assessment Stool Appearance: Loose (08/29/18 2025) Emesis:  0 occurrences. VITAL SIGNS Patient Vitals for the past 12 hrs: 
 Temp Pulse Resp BP SpO2  
08/30/18 0210 98 °F (36.7 °C) 87 20 129/85 96 % 08/29/18 2326 97.8 °F (36.6 °C) 82 20 115/66 97 % 08/29/18 1925 99 °F (37.2 °C) (!) 101 22 135/76 95 % Pain Assessment Pain 1 Pain Scale 1: Numeric (0 - 10) (08/30/18 0225) Pain Intensity 1: 0 (08/30/18 0225) Patient Stated Pain Goal: 0 (08/30/18 0225) Pain Onset 1: chronic (08/29/18 1114) Pain Location 1: Throat;Generalized (08/29/18 1114) Ambulating Yes Additional Information: Patient rested well. Given benadryl once during the night. Otherwise uneventful night Shift report given to oncoming nurse at the bedside. Em Gallagher

## 2018-08-30 NOTE — PROCEDURES
After informed consent was obtained, the patient was positioned upright in the usual fashion.  Ultrasound was used to identify the optimal spot for pleural drainage.     Very Small effusion noted in right chest. Lung floating to surface.  No attempt made to drain.      EBL --none     Patient stable post procedure     No samples sent.      Signed By: Bean Pedersen MD

## 2018-08-30 NOTE — PROCEDURES
Summary: After informed consent was obtained, the patient was positioned upright in the usual fashion.  Ultrasound was used to identify the optimal spot for pleural drainage. Very Small effusion noted in right chest. No attempt made to drain. EBL --none Patient stable post procedure No samples sent. 
 
 Signed By: Solange Schwartz MD

## 2018-08-30 NOTE — PROGRESS NOTES
Met with patient and his wife regarding discharge planning. Patient is independent in all ADLs at baseline and remains active with hiking and photography as regular hobbies. Patient and his wife state that his goal is to return to his baseline state of health. They plan to return home at discharge. We discussed home health as an option at discharge, based upon any identified needs while here. They remain hopeful that patient will return to baseline rapidly and that no services will be needed. Case Management will continue to follow. Care Management Interventions Transition of Care Consult (CM Consult): Discharge Planning Discharge Durable Medical Equipment: No 
Physical Therapy Consult: Yes Occupational Therapy Consult: No 
Speech Therapy Consult: No 
Current Support Network: Lives with Spouse, Own Home Confirm Follow Up Transport: Self Plan discussed with Pt/Family/Caregiver: Yes Freedom of Choice Offered: Yes Discharge Location Discharge Placement: Home

## 2018-08-30 NOTE — CONSULTS
[facility] Cardiology Consult                Date of  Admission: 8/29/2018 11:37 AM     Primary Care Physician:  Dr. Yudi Zambrano  Primary Cardiologist:  [facility]  Referring Physician:  Dr. Herring [facility] Physician:  Dr. Ban Mitchell    CC/Reason for consult:  pericarditis      Aleida Melendez is a 78 y.o. male with PMH of CAD with PCI (nuclear stress on 6-2018 was negative for reversible ischemia), GI bleed, peptic ulcer, HLD, and HTN, who presented to the ED with complaints of worsening dyspnea x 4 days, TIDWELL, pleuritic chest pain, cough and fever. The chest pain is improved with leaning forward and worse when laying down. EKG showed ST without acute ST changes. The patient has been treated for viral pericarditis since 8-4-18. He was treated with colchicine and NSAID, but he developed diarrhea with the colchicine. In the ED he reported that he felt very short of breath and tired. Work up in the ED showed CXR with bilateral pleural effusions, leukocytosis with WBCs 13.5, lactic 2.2, CRP 21.4. He was also noted to be hypoxic with saturations in the 70s. He was admitted for further treatment. CT of the chest showed no PE, pleural effusions, bilateral infiltrates, and pericardial effusion. He underwent a left thoracentesis yesterday with 1100cc removed. Patient has been started on Indocin for his pericarditis and reports improvement in chest pain. His dyspnea is also improved post thoracentesis. Echo is pending, however, echo from 8-14-18 revealed a moderate pericardial effusion that was not there on echo 8-4-18. His CRP level from 8-14-18 was 63.7 and sed rate 48.       Patient Active Problem List   Diagnosis Code    Allergic rhinitis J30.9    Hypercholesterolemia E78.00    Hypertension I10    Restless leg syndrome G25.81    IBS (irritable bowel syndrome) K58.9    BPH (benign prostatic hyperplasia) N40.0    Arthritis of shoulder region, right, degenerative M19.011    Coronary artery disease involving native coronary artery of native heart without angina pectoris I25.10    DAVID (obstructive sleep apnea) G47.33    Upper GI bleed K92.2    Acute blood loss anemia D62    Community acquired bacterial pneumonia J15.9    Peptic ulcer disease K27.9    Pericarditis with effusion I31.9    Hemoptysis R04.2    Acute respiratory failure with hypoxia (HCC) J96.01    Pleural effusion, right J90    Pleural effusion, left J90    Hypoxemia R09.02    Thrush B37.0       Past Medical History:   Diagnosis Date    Allergic rhinitis     Aortic insufficiency     Aortic sclerosis     no stenosis- ECHO 9/8/14  LVEF 50-55%    BPH with urinary obstruction     resolved with surgery    CAD (coronary artery disease)     stent X1 in 2010. .managed with medications    Chronic pain     right shoulder    Depression     Dyslipidemia     managed with medications    Former pipe smoker     stopped at age 30    GERD (gastroesophageal reflux disease)     managed with PRN medications    GI bleed     from Plavix    History of depression     Hypercholesterolemia     Hypertension     controlled w/med    IBS (irritable bowel syndrome)     manged with medications    Macular degeneration     managed with medications    Old MI (myocardial infarction)     x2; last 9/2014    Osteoarthritis     in shoulders and back    PUD (peptic ulcer disease) 3/2011    no recent episodes    Restless leg syndrome     manged with medications    Seasonal allergic rhinitis     PRN medications    Unspecified sleep apnea     wears cpap      Past Surgical History:   Procedure Laterality Date    HX CATARACT REMOVAL Bilateral     HX COLONOSCOPY      HX HEART CATHETERIZATION  2010, 3/2011,4/2011    (1)stent 2010     HX HEART CATHETERIZATION  9/2014    no stent required    HX SHOULDER ARTHROSCOPY Right 2010 approx    HX SHOULDER REPLACEMENT Right 2015    HX TONSIL AND ADENOIDECTOMY      as a child    HX TURP  05/15; 07/15     Allergies   Allergen Reactions  Augmentin [Amoxicillin-Pot Clavulanate] Nausea and Vomiting     Severe abd cramping  Severe abd cramping    Oxycodone Unknown (comments)     Sever headaches, abdominal issues      Family History   Problem Relation Age of Onset    Heart Disease Mother     High Cholesterol Mother     Hypertension Mother     Coronary Artery Disease Mother     Cancer Father      lung cancer--    Heart Disease Brother     Hypertension Brother         Current Facility-Administered Medications   Medication Dose Route Frequency    sodium chloride (NS) flush 5-10 mL  5-10 mL IntraVENous Q8H    sodium chloride (NS) flush 5-10 mL  5-10 mL IntraVENous PRN    acetaminophen (TYLENOL) tablet 650 mg  650 mg Oral Q4H PRN    diphenhydrAMINE (BENADRYL) injection 12.5 mg  12.5 mg IntraVENous Q4H PRN    ondansetron (ZOFRAN) injection 4 mg  4 mg IntraVENous Q4H PRN    bisacodyl (DULCOLAX) tablet 5 mg  5 mg Oral DAILY PRN    enoxaparin (LOVENOX) injection 40 mg  40 mg SubCUTAneous Q24H    cefTRIAXone (ROCEPHIN) 1 g in 0.9% sodium chloride (MBP/ADV) 50 mL  1 g IntraVENous Q24H    azithromycin (ZITHROMAX) tablet 500 mg  500 mg Oral Q24H    pantoprazole (PROTONIX) 40 mg in sodium chloride 0.9% 10 mL injection  40 mg IntraVENous Q12H    0.9% sodium chloride infusion  125 mL/hr IntraVENous CONTINUOUS    indomethacin (INDOCIN) capsule 50 mg  50 mg Oral TID WITH MEALS    amitriptyline (ELAVIL) tablet 10 mg  10 mg Oral QHS    clopidogrel (PLAVIX) tablet 75 mg  75 mg Oral DAILY    isosorbide mononitrate ER (IMDUR) tablet 30 mg  30 mg Oral DAILY    lisinopril (PRINIVIL, ZESTRIL) tablet 5 mg  5 mg Oral DAILY    nitroglycerin (NITROSTAT) tablet 0.4 mg  0.4 mg SubLINGual Q5MIN PRN    pramipexole (MIRAPEX) tablet 0.5 mg  0.5 mg Oral QHS    traMADol (ULTRAM) tablet 50 mg  50 mg Oral Q6H PRN    magic mouthwash (JOSÉ) oral suspension 5 mL  5 mL Oral Q6H       Review of Systems   Constitutional: Positive for fever and malaise/fatigue. HENT: Negative. Eyes: Negative. Respiratory: Positive for shortness of breath. Cardiovascular: Positive for chest pain. Gastrointestinal: Negative. Genitourinary: Negative. Musculoskeletal: Negative. Skin: Negative. Neurological: Negative. Endo/Heme/Allergies: Negative. Psychiatric/Behavioral: Negative. Physical Exam  Vitals:    08/29/18 2326 08/30/18 0210 08/30/18 0726 08/30/18 0741   BP: 115/66 129/85 147/76    Pulse: 82 87 88    Resp: 20 20 20    Temp: 97.8 °F (36.6 °C) 98 °F (36.7 °C) 98.8 °F (37.1 °C)    SpO2: 97% 96%  96%   Weight:       Height:           Physical Exam:  Physical Exam   Constitutional: He is well-developed, well-nourished, and in no distress. HENT:   Head: Normocephalic. Eyes: Pupils are equal, round, and reactive to light. Neck: Normal range of motion. Cardiovascular: Normal rate and regular rhythm. Pulmonary/Chest: Tachypnea noted. He has decreased breath sounds. Abdominal: Soft. Bowel sounds are normal.   Musculoskeletal: Normal range of motion. Neurological: He is alert. Skin: Skin is warm and dry. Psychiatric: Mood, memory, affect and judgment normal.       Cardiographics      ECG: nonspecific ST and T waves changes, sinus tachycardia  Echocardiogram: pending    Labs:   Recent Labs      08/30/18   0556  08/29/18   1118   NA  141  136   K  3.5  4.0   BUN  9  14   CREA  0.58*  0.86   GLU  104*  129*   WBC  7.8  13.9*   HGB  8.9*  10.7*   HCT  29.1*  35.1*   PLT  452*  635*        Assessment/Plan:     Assessment:      Principal Problem:    Acute respiratory failure with hypoxia -- sats acceptable on 1L O2 via NC. S/p L thoracentesis with 1100ccs removed      Pericarditis with effusion -- repeat echo pending to evaluate the effusion. Restart colchicine 0.6 BID. Continue PPI. Repeat CRP and sed rate. Check KATHY. If effusion continues to expand, likely bacterial pericarditis,  may need pericardiocentesis.   May also need to consider consulting ID. Hypertension --stable      Overview: controlled w/med      Restless leg syndrome      Overview: manged with medications      Community acquired bacterial pneumonia -- on abx      Peptic ulcer disease-- monitor for s/s of bleeding      Hemoptysis -- monitor       Pleural effusion, right       Pleural effusion, left -- s/p thoracentesis on 8-29-18, 1100 ccs removed      Hypoxemia -- acceptable sats on 1L NC      Thrush -- per primary          Thank you very much for this referral. We appreciate the opportunity to participate in this patient's care. We will follow along with above stated plan. Thomas Gramajo NP  Consulting MD: Geoffrey Payton    I have personally seen and examined patient and agree with above assessment. I agree and confirm with findings with additional details/exceptions as listed below:    Initial presentation at [facility] with pericarditis on 8/4/18; presumed to be possibly viral related and patient started on colchicine with nonsteroidal therapy withheld with prior issues with PUD. Echo at that time with preserved EF and no noted effusion. Repeat presentation on 8/14/18 with chest pain at which time reported mild to moderate effusion. Given a short course of ibuprofen for 5 days at that time and colchicine increased to tid. States has been having febrile episodes over the last few weeks. Some nonproductive cough. Since presentation over here, been started on Indocin with improved chest discomfort. CT of the chest reviewed with pleural effusion/reported pericardial effusion. Underwent thoracentesis with improved dyspnea. Appears some prior issues with diarrhea on colchicine and worse with tid colchicine. CT with bilateral infiltrates and currently being treated for pneumonia by pulmonary. Plan repeat echocardiogram today and another in the next few days.   If worsening effusion despite therapy, may need to consider pericardiocentesis for diagnostic purposes. Hold off using steroid therapy due to increased risk of current pericarditis. Continue Indocin and start colchicine with twice daily dosing. Currently would classify as incessant pericarditis necessitating 3-6 months of colchicine and at least 4 weeks of nonsteroidals. If persistent despite above therapy, then consideration for steroids but would like to avoid. Also check KATHY. Noted last CRP/sed rate from [facility] at 64/48; plan repeat. Long-term will decrease nonsteroidals based on CRP trend but plan to wean every 1-2 weeks. Continue PPI. Extensive discussion with patient and wife. Further recommendations pending clinical course.     Amanda Dugan MD  8/30/2018  5:24 PM

## 2018-08-30 NOTE — PROGRESS NOTES
Pt has some non blanchable redness to left buttocks. Alleviant applied at this time. Pt encouraged to turn and reposition throughout the day.

## 2018-08-31 ENCOUNTER — APPOINTMENT (OUTPATIENT)
Dept: GENERAL RADIOLOGY | Age: 78
DRG: 177 | End: 2018-08-31
Attending: INTERNAL MEDICINE
Payer: MEDICARE

## 2018-08-31 PROBLEM — J96.01 ACUTE RESPIRATORY FAILURE WITH HYPOXIA (HCC): Status: RESOLVED | Noted: 2018-08-29 | Resolved: 2018-08-31

## 2018-08-31 PROBLEM — K27.9 PEPTIC ULCER DISEASE: Chronic | Status: ACTIVE | Noted: 2018-08-29

## 2018-08-31 PROBLEM — R09.02 HYPOXEMIA: Status: RESOLVED | Noted: 2018-08-29 | Resolved: 2018-08-31

## 2018-08-31 PROBLEM — R04.2 HEMOPTYSIS: Status: RESOLVED | Noted: 2018-08-29 | Resolved: 2018-08-31

## 2018-08-31 LAB
ANA SER QL: NEGATIVE
ANION GAP SERPL CALC-SCNC: 8 MMOL/L (ref 7–16)
APPEARANCE FLD: NORMAL
APPEARANCE FLD: NORMAL
BUN SERPL-MCNC: 6 MG/DL (ref 8–23)
CALCIUM SERPL-MCNC: 7.9 MG/DL (ref 8.3–10.4)
CHLORIDE SERPL-SCNC: 106 MMOL/L (ref 98–107)
CO2 SERPL-SCNC: 29 MMOL/L (ref 21–32)
COLOR FLD: NORMAL
COLOR FLD: NORMAL
CREAT SERPL-MCNC: 0.52 MG/DL (ref 0.8–1.5)
ERYTHROCYTE [DISTWIDTH] IN BLOOD BY AUTOMATED COUNT: 18 %
FLUID COMMENTS, FCOM: NORMAL
FLUID COMMENTS, FCOM: NORMAL
GLUCOSE SERPL-MCNC: 93 MG/DL (ref 65–100)
HCT VFR BLD AUTO: 29.6 % (ref 41.1–50.3)
HGB BLD-MCNC: 8.8 G/DL (ref 13.6–17.2)
LYMPHOCYTES NFR FLD: 16 %
LYMPHOCYTES NFR FLD: 58 %
MCH RBC QN AUTO: 22.4 PG (ref 26.1–32.9)
MCHC RBC AUTO-ENTMCNC: 29.7 G/DL (ref 31.4–35)
MCV RBC AUTO: 75.5 FL (ref 79.6–97.8)
MONOS+MACROS NFR FLD: 24 %
MONOS+MACROS NFR FLD: 7 %
NEUTROPHILS NFR FLD: 35 %
NEUTROPHILS NFR FLD: 60 %
NRBC # BLD: 0 K/UL (ref 0–0.2)
NUC CELL # FLD: 1586 /CU MM
NUC CELL # FLD: 3072 /CU MM
PLATELET # BLD AUTO: 507 K/UL (ref 150–450)
PMV BLD AUTO: 10.4 FL (ref 9.4–12.3)
POTASSIUM SERPL-SCNC: 3.7 MMOL/L (ref 3.5–5.1)
RBC # BLD AUTO: 3.92 M/UL (ref 4.23–5.6)
RBC # FLD: NORMAL /CU MM
RBC # FLD: NORMAL /CU MM
SEE BELOW:, 164879: NORMAL
SODIUM SERPL-SCNC: 143 MMOL/L (ref 136–145)
SPECIMEN SOURCE FLD: NORMAL
SPECIMEN SOURCE FLD: NORMAL
WBC # BLD AUTO: 7.3 K/UL (ref 4.3–11.1)

## 2018-08-31 PROCEDURE — 87116 MYCOBACTERIA CULTURE: CPT

## 2018-08-31 PROCEDURE — 80048 BASIC METABOLIC PNL TOTAL CA: CPT

## 2018-08-31 PROCEDURE — 87205 SMEAR GRAM STAIN: CPT

## 2018-08-31 PROCEDURE — 76604 US EXAM CHEST: CPT | Performed by: INTERNAL MEDICINE

## 2018-08-31 PROCEDURE — 82945 GLUCOSE OTHER FLUID: CPT

## 2018-08-31 PROCEDURE — 32555 ASPIRATE PLEURA W/ IMAGING: CPT | Performed by: INTERNAL MEDICINE

## 2018-08-31 PROCEDURE — 97530 THERAPEUTIC ACTIVITIES: CPT

## 2018-08-31 PROCEDURE — 77030020263 HC SOL INJ SOD CL0.9% LFCR 1000ML

## 2018-08-31 PROCEDURE — 74011250637 HC RX REV CODE- 250/637: Performed by: INTERNAL MEDICINE

## 2018-08-31 PROCEDURE — 76040000026: Performed by: INTERNAL MEDICINE

## 2018-08-31 PROCEDURE — 74011000250 HC RX REV CODE- 250: Performed by: INTERNAL MEDICINE

## 2018-08-31 PROCEDURE — 87102 FUNGUS ISOLATION CULTURE: CPT

## 2018-08-31 PROCEDURE — 65660000000 HC RM CCU STEPDOWN

## 2018-08-31 PROCEDURE — 74011000258 HC RX REV CODE- 258: Performed by: INTERNAL MEDICINE

## 2018-08-31 PROCEDURE — 71045 X-RAY EXAM CHEST 1 VIEW: CPT

## 2018-08-31 PROCEDURE — 83615 LACTATE (LD) (LDH) ENZYME: CPT

## 2018-08-31 PROCEDURE — 36415 COLL VENOUS BLD VENIPUNCTURE: CPT

## 2018-08-31 PROCEDURE — C1729 CATH, DRAINAGE: HCPCS | Performed by: INTERNAL MEDICINE

## 2018-08-31 PROCEDURE — 74011250636 HC RX REV CODE- 250/636: Performed by: INTERNAL MEDICINE

## 2018-08-31 PROCEDURE — 0W9B3ZZ DRAINAGE OF LEFT PLEURAL CAVITY, PERCUTANEOUS APPROACH: ICD-10-PCS | Performed by: INTERNAL MEDICINE

## 2018-08-31 PROCEDURE — 0W993ZZ DRAINAGE OF RIGHT PLEURAL CAVITY, PERCUTANEOUS APPROACH: ICD-10-PCS | Performed by: INTERNAL MEDICINE

## 2018-08-31 PROCEDURE — 85027 COMPLETE CBC AUTOMATED: CPT

## 2018-08-31 PROCEDURE — 88112 CYTOPATH CELL ENHANCE TECH: CPT

## 2018-08-31 PROCEDURE — 84157 ASSAY OF PROTEIN OTHER: CPT

## 2018-08-31 PROCEDURE — 88305 TISSUE EXAM BY PATHOLOGIST: CPT

## 2018-08-31 PROCEDURE — C9113 INJ PANTOPRAZOLE SODIUM, VIA: HCPCS | Performed by: INTERNAL MEDICINE

## 2018-08-31 PROCEDURE — 99232 SBSQ HOSP IP/OBS MODERATE 35: CPT | Performed by: INTERNAL MEDICINE

## 2018-08-31 PROCEDURE — 89050 BODY FLUID CELL COUNT: CPT

## 2018-08-31 PROCEDURE — 74011250637 HC RX REV CODE- 250/637: Performed by: NURSE PRACTITIONER

## 2018-08-31 RX ORDER — METOPROLOL SUCCINATE 50 MG/1
100 TABLET, EXTENDED RELEASE ORAL DAILY
Status: DISCONTINUED | OUTPATIENT
Start: 2018-09-01 | End: 2018-09-02 | Stop reason: HOSPADM

## 2018-08-31 RX ADMIN — CLOPIDOGREL BISULFATE 75 MG: 75 TABLET ORAL at 08:27

## 2018-08-31 RX ADMIN — Medication 10 ML: at 13:00

## 2018-08-31 RX ADMIN — INDOMETHACIN 50 MG: 50 CAPSULE ORAL at 16:44

## 2018-08-31 RX ADMIN — COLCHICINE 0.6 MG: 0.6 TABLET, FILM COATED ORAL at 21:26

## 2018-08-31 RX ADMIN — Medication 10 ML: at 01:46

## 2018-08-31 RX ADMIN — ENOXAPARIN SODIUM 40 MG: 40 INJECTION, SOLUTION INTRAVENOUS; SUBCUTANEOUS at 16:44

## 2018-08-31 RX ADMIN — SODIUM CHLORIDE 40 MG: 9 INJECTION INTRAMUSCULAR; INTRAVENOUS; SUBCUTANEOUS at 08:27

## 2018-08-31 RX ADMIN — SODIUM CHLORIDE 40 MG: 9 INJECTION INTRAMUSCULAR; INTRAVENOUS; SUBCUTANEOUS at 21:27

## 2018-08-31 RX ADMIN — Medication 10 ML: at 21:26

## 2018-08-31 RX ADMIN — DIPHENHYDRAMINE HYDROCHLORIDE 12.5 MG: 50 INJECTION, SOLUTION INTRAMUSCULAR; INTRAVENOUS at 01:45

## 2018-08-31 RX ADMIN — AZITHROMYCIN 500 MG: 250 TABLET, FILM COATED ORAL at 12:53

## 2018-08-31 RX ADMIN — PRAMIPEXOLE DIHYDROCHLORIDE 0.5 MG: 1 TABLET ORAL at 21:27

## 2018-08-31 RX ADMIN — LISINOPRIL 5 MG: 5 TABLET ORAL at 08:27

## 2018-08-31 RX ADMIN — CEFTRIAXONE SODIUM 1 G: 1 INJECTION, POWDER, FOR SOLUTION INTRAMUSCULAR; INTRAVENOUS at 13:00

## 2018-08-31 RX ADMIN — INDOMETHACIN 50 MG: 50 CAPSULE ORAL at 08:28

## 2018-08-31 RX ADMIN — Medication 5 ML: at 12:54

## 2018-08-31 RX ADMIN — ISOSORBIDE MONONITRATE 30 MG: 30 TABLET, EXTENDED RELEASE ORAL at 08:27

## 2018-08-31 RX ADMIN — Medication 10 ML: at 05:27

## 2018-08-31 RX ADMIN — COLCHICINE 0.6 MG: 0.6 TABLET, FILM COATED ORAL at 08:27

## 2018-08-31 RX ADMIN — INDOMETHACIN 50 MG: 50 CAPSULE ORAL at 12:53

## 2018-08-31 RX ADMIN — Medication 5 ML: at 23:54

## 2018-08-31 RX ADMIN — AMITRIPTYLINE HYDROCHLORIDE 10 MG: 10 TABLET, FILM COATED ORAL at 21:26

## 2018-08-31 NOTE — INTERVAL H&P NOTE
H&P Update: 
Candida Mcleod was seen and examined. History and physical has been reviewed. The patient has been examined.  There have been no significant clinical changes since the completion of the originally dated History and Physical. 
 
Signed By: Myla Gonzalez MD   
 August 31, 2018 10:35 AM

## 2018-08-31 NOTE — PROGRESS NOTES
RT in Pt room to place on home unit , however Pt. Was eating. RT returned later at Pt. Request  Pt. Was placed on home CPAP unit at 2305.

## 2018-08-31 NOTE — PROGRESS NOTES
Hospitalist Progress Note 2018 Admit Date: 2018 11:37 AM  
NAME: Iglesia Guo :  1940 MRN:  [id number] Attending: Bushra Chavez MD 
PCP:  Brandan Hendrix MD 
 
SUBJECTIVE:  
 
Iglesia Guo is a 78 M with PMH of CAD s/p stent, GI Bleed, Peptic ulcers, HLD, HTN was sent to the ER on  by PCP for abnormal Labs and suspected infection and SOB. Pt was noted to have bilateral pleural effusion L>>R, had removal of 1100 ml of fluid from left. Pt started on rocephin/azithro for LLL CAP, received vanc and zosyn in ER. Recently discharged from [facility] after being treated for acute viral pericarditis, treated initially with colchicine, and later with NSAID. Pulmonary is on board. : 
Pt reports feeling better Doing well on room air. No chest pain, cough, abdominal pain. C/o of mild sore throat, loose stools. Review of Systems negative with exception of pertinent positives noted above PHYSICAL EXAM  
 
 
Visit Vitals  /74 (BP 1 Location: Left arm, BP Patient Position: At rest)  Pulse 86  Temp 97.7 °F (36.5 °C)  Resp 18  Ht 5' 8\" (1.727 m)  Wt 78 kg (171 lb 14.4 oz)  SpO2 94%  BMI 26.14 kg/m2 Temp (24hrs), Av °F (36.7 °C), Min:97.4 °F (36.3 °C), Max:98.8 °F (37.1 °C) Oxygen Therapy O2 Sat (%): 94 % (18 1931) Pulse via Oximetry: 102 beats per minute (18 1429) O2 Device: Nasal cannula (18 0741) O2 Flow Rate (L/min): 1 l/min (weaned from 2) (18 0741) ETCO2 (mmHg): 95 mmHg (18 2311) Intake/Output Summary (Last 24 hours) at 18 0416 Last data filed at 18 2151 Gross per 24 hour Intake             3144 ml Output              100 ml Net             3044 ml General:                    Alert, cooperative, no distress Head:                                   NCAT. No obvious deformity Nose:                                   Nares normal. No drainage Lungs:                       breath sounds clear on left, diminished on right base, mild bibasilar crackles Heart:                                  RRR. No m/r/g. Abdomen:                  S/nt/nd. Bowel sounds normal.  
Extremities:               No cyanosis. Skin:                                    No rashes or lesions. Not Jaundiced Neurologic:               GCS 15, no motor or sensory deficits, CN 2-12 intact Recent Results (from the past 24 hour(s)) SED RATE, AUTOMATED Collection Time: 08/30/18  1:55 PM  
Result Value Ref Range Sed rate, automated 62 (H) 0 - 20 mm/hr C REACTIVE PROTEIN, QT Collection Time: 08/30/18  1:55 PM  
Result Value Ref Range C-Reactive protein 14.4 (H) 0.0 - 0.9 mg/dL Imaging Jeanne Mindy [facility] Portable chest xray   
  
COMPARISON: August 29, 2018. 
  
CLINICAL HISTORY: Check effusions. 
  
FINDINGS: 
  
Lungs are underinflated. Persistent elevation of the right hemidiaphragm. There 
are bibasilar densities, likely combination of pleural effusion and atelectasis. No pneumothorax or pulmonary edema. Cardiac mediastinal contour and the 
surrounding bones are stable. 
  
IMPRESSION IMPRESSION: 
  
Bilateral pleural effusions and associated atelectasis. ASSESSMENT Hospital Problems as of 8/31/2018  Date Reviewed: 8/30/2018 Codes Class Noted - Resolved POA Community acquired bacterial pneumonia ICD-10-CM: J15.9 ICD-9-CM: 482.9  8/29/2018 - Present Yes Peptic ulcer disease ICD-10-CM: K27.9 ICD-9-CM: 533.90  8/29/2018 - Present Yes Pericarditis with effusion ICD-10-CM: I31.9 ICD-9-CM: 423.9  8/29/2018 - Present Yes Hemoptysis ICD-10-CM: R04.2 ICD-9-CM: 786.30  8/29/2018 - Present Yes * (Principal)Acute respiratory failure with hypoxia ([facility] [address] ICD-10-CM: J96.01 
ICD-9-CM: 518.81  8/29/2018 - Present Yes Pleural effusion, right ICD-10-CM: J90 ICD-9-CM: 511.9  8/29/2018 - Present Unknown Pleural effusion, left ICD-10-CM: J90 ICD-9-CM: 511.9  8/29/2018 - Present Unknown Hypoxemia ICD-10-CM: R09.02 
ICD-9-CM: 799.02  8/29/2018 - Present Unknown Thrush ICD-10-CM: B37.0 ICD-9-CM: 112.0  8/29/2018 - Present Unknown DAVID (obstructive sleep apnea) ICD-10-CM: G47.33 
ICD-9-CM: 327.23  11/14/2016 - Present Yes  
   
 BPH (benign prostatic hyperplasia) ICD-10-CM: N40.0 ICD-9-CM: 600.00  9/9/2015 - Present Yes Hypercholesterolemia ICD-10-CM: E78.00 ICD-9-CM: 272.0  Unknown - Present Yes Hypertension ICD-10-CM: I10 
ICD-9-CM: 401.9  Unknown - Present Yes Overview Signed 8/21/2015  8:46 AM by Julio C Melgar  
  controlled w/med Restless leg syndrome ICD-10-CM: G25.81 ICD-9-CM: 333.94  Unknown - Present Yes Overview Signed 8/21/2015  8:52 AM by Julio C Melgar  
  manged with medications IBS (irritable bowel syndrome) ICD-10-CM: K58.9 ICD-9-CM: 564.1  Unknown - Present Yes Overview Signed 8/21/2015  8:52 AM by Julio C Melgar  
  manged with medications Plan: 
- continue IV azithro and rocephin, D3 
- repeat thoracentesis today with removal of 700 cc from left, 600 cc from right. Fluid appeared serosanguinous. - sputum culture positive for GPC, placed on contact precaution. 
- Cardiology recommends 3-6 months of Colchicine and NSAID Rx. Repeat Echo showed trace to small pericardial effusion, EF 55-60% - Pulmonary on board, appreciate their input - currently on room air, no distress noted 
- continue other home meds as reconciled in [facility] 
 
DVT Prophylaxis: lovenox Dispo: pt is not medically cleared yet for discharge to home.  
 
Lashonda Whittaker MD

## 2018-08-31 NOTE — PROGRESS NOTES
Pt is in room alert and oriented. rr are even and unlabored. He is on room air. Pt states he feels better. He is able to ambulate with out assistance. Wife at bedside. Safety measures in place will continue to monitor.

## 2018-08-31 NOTE — PROGRESS NOTES
SBAR received from Patrica Mejia RN. Patient alert and oriented, resting in bed. Respirations even and unlabored on room air. Denies any pain or discomfort at this time. Patient reports loose stools today. Call light within reach. Instructed to call for assistance as needed. Patient verbalized understanding.

## 2018-08-31 NOTE — H&P (VIEW-ONLY)
CONSULT NOTE Lisa Conley 8/29/2018 Date of Admission:  8/29/2018 The patient's chart is reviewed and the patient is discussed with the staff. Subjective:  
 
Patient is a 78 y.o.  male seen and evaluated at the request of Dr. Yulissa Johnson. Patient with CAD s/p stent, recent Peptic ulcers, HLD, HTN came in tonight since has been having chest pain/pressures with dyspnea which has not improved since 2 hospitalization at [facility]. He reported was told had bout of pericarditis, likely viral, after seeing her daughter in [place] whose 3 small children had viral infection. Did get treatment was colchine and ibuprofen starting 8/4 and then discharged. He reported the chest pain and discomfort did not improve and went back on 8/14 and again given colchine and ibuprofrem. The reports that pain both stabbign and pressure only slightly bettter but was short of breath and came here. Her noted wbc and felt had pneumonia started abx. Had CT done nand given hypoxemia asked if we would addist with care. Patient does have a remote history of pipe smoking 16 years but quit over 30 years ago. Prior record reviewed in Care Everywhere, Pt had nl EF bit did develop Moderate Pericardial Effusion. His BP is stable in ER. LA is 2.2, WBC 13.9. Could not take steroids for Pericarditis due to recent GI Bleed. 
  
Review of Systems: 
-Fever 
-Headaches +Chest pain +Dyspnea, +wheezing, +cough +Abdominal pain, -constipation 
-Leg swelling All other organ systems grossly normal. 
 
Patient Active Problem List  
Diagnosis Code  Allergic rhinitis J30.9  Hypercholesterolemia E78.00  Hypertension I10  Restless leg syndrome G25.81  
 IBS (irritable bowel syndrome) K58.9  BPH (benign prostatic hyperplasia) N40.0  Arthritis of shoulder region, right, degenerative M19.011  
 Coronary artery disease involving native coronary artery of native heart without angina pectoris I25.10  
 DAVID (obstructive sleep apnea) G47.33  
 Upper GI bleed K92.2  Acute blood loss anemia D62  Community acquired bacterial pneumonia J15.9  Peptic ulcer disease K27.9  Pericarditis with effusion I31.9  Hemoptysis R04.2  Acute respiratory failure with hypoxia (HCC) J96.01  
 Pleural effusion, right J90  
 Pleural effusion, left J90  
 Hypoxemia R09.02  Thrush B37.0 Prior to Admission Medications Prescriptions Last Dose Informant Patient Reported? Taking?  
acetaminophen (TYLENOL ARTHRITIS PAIN) 650 mg CR tablet   Yes No  
Sig: Take 650 mg by mouth every six (6) hours as needed for Pain. Take / use AM day of surgery  per anesthesia protocols if needed  
amitriptyline (ELAVIL) 10 mg tablet   No No  
Sig: TAKE 1 TABLET ONCE DAILY   FOR GASTROINTESTINAL UPSET PREVENTION/IRRITABLE BOWEL SYNDROME aspirin delayed-release 81 mg tablet   Yes No  
Sig: Take 81 mg by mouth daily. Take / use AM day of surgery  per anesthesia protocols. clopidogrel (PLAVIX) 75 mg tab   No No  
Sig: TAKE 1 TABLET DAILY  
colchicine 0.6 mg tablet   Yes No  
Sig: Take 0.6 mg by mouth three (3) times daily. cpap machine kit   Yes No  
Sig: by Does Not Apply route. isosorbide mononitrate ER (IMDUR) 30 mg tablet   No No  
Sig: TAKE 1 TABLET DAILY FOR    CHRONIC STABLE ANGINA      PECTORIS  
isosorbide mononitrate ER (IMDUR) 30 mg tablet   Yes No  
Sig: Take 1 Tab by mouth every morning. lisinopril (PRINIVIL, ZESTRIL) 5 mg tablet   No No  
Sig: TAKE 1 TABLET ONCE DAILY   FOR HYPERTENSION  
metoprolol succinate (TOPROL-XL) 100 mg tablet   No No  
Sig: Take 1 tablet by mouth once daily  Indications: hypertension  
nitroglycerin (NITROSTAT) 0.4 mg SL tablet   No No  
Si Tab by SubLINGual route every five (5) minutes as needed for Chest Pain (Last reported use was 4/12/15 approx).   
Patient taking differently: 0.4 mg by SubLINGual route every five (5) minutes as needed for Chest Pain.  
pantoprazole (PROTONIX) 40 mg tablet   No No  
Sig: Take 1 Tab by mouth two (2) times a day. pramipexole (MIRAPEX) 0.5 mg tablet   No No  
Sig: TAKE 1 TABLET NIGHTLY FOR    RESTLESS LEGS SYNDROME  
traMADol (ULTRAM) 50 mg tablet   Yes No  
Sig: TAKE 1 TABLET BY MOUTH EVERY 4 HOURS AS NEEDED FOR PAIN Facility-Administered Medications: None Past Medical History:  
Diagnosis Date  Allergic rhinitis  Aortic insufficiency  Aortic sclerosis   
 no stenosis- ECHO 9/8/14  LVEF 50-55%  BPH with urinary obstruction   
 resolved with surgery  CAD (coronary artery disease) stent X1 in 2010. .managed with medications  Chronic pain   
 right shoulder  Depression  Dyslipidemia   
 managed with medications  Former pipe smoker   
 stopped at age 30  GERD (gastroesophageal reflux disease)   
 managed with PRN medications  GI bleed   
 from Plavix  History of depression  Hypercholesterolemia  Hypertension   
 controlled w/med  IBS (irritable bowel syndrome)   
 manged with medications  Macular degeneration   
 managed with medications  Old MI (myocardial infarction) x2; last 9/2014  Osteoarthritis   
 in shoulders and back  PUD (peptic ulcer disease) 3/2011  
 no recent episodes  Restless leg syndrome   
 manged with medications  Seasonal allergic rhinitis PRN medications  Unspecified sleep apnea   
 wears cpap Past Surgical History:  
Procedure Laterality Date  HX CATARACT REMOVAL Bilateral   
 HX COLONOSCOPY    
 HX HEART CATHETERIZATION  2010, 3/2011,4/2011  
 (1)stent 2010  HX HEART CATHETERIZATION  9/2014  
 no stent required  HX SHOULDER ARTHROSCOPY Right 2010 approx  HX SHOULDER REPLACEMENT Right 2015  HX TONSIL AND ADENOIDECTOMY    
 as a child  HX TURP  05/15; 07/15 Social History Social History  Marital status:    Spouse name: N/A  
  Number of children: N/A  
 Years of education: N/A Occupational History  Not on file. Social History Main Topics  Smoking status: Former Smoker Packs/day: 0.00 Years: 0.00  Smokeless tobacco: Never Used Comment: quit smoking pipe 1960s--- no cigs  Alcohol use No  
 Drug use: No  
 Sexual activity: Not on file Other Topics Concern  Not on file Social History Narrative Family History Problem Relation Age of Onset  Heart Disease Mother  High Cholesterol Mother  Hypertension Mother  Coronary Artery Disease Mother  Cancer Father   
  lung cancer--  Heart Disease Brother  Hypertension Brother Allergies Allergen Reactions  Augmentin [Amoxicillin-Pot Clavulanate] Nausea and Vomiting Severe abd cramping Severe abd cramping  Oxycodone Unknown (comments) Sever headaches, abdominal issues Current Facility-Administered Medications Medication Dose Route Frequency  sodium chloride (NS) flush 5-10 mL  5-10 mL IntraVENous Q8H  
 sodium chloride (NS) flush 5-10 mL  5-10 mL IntraVENous PRN  
 acetaminophen (TYLENOL) tablet 650 mg  650 mg Oral Q4H PRN  
 diphenhydrAMINE (BENADRYL) injection 12.5 mg  12.5 mg IntraVENous Q4H PRN  
 ondansetron (ZOFRAN) injection 4 mg  4 mg IntraVENous Q4H PRN  
 bisacodyl (DULCOLAX) tablet 5 mg  5 mg Oral DAILY PRN  
 enoxaparin (LOVENOX) injection 40 mg  40 mg SubCUTAneous Q24H  cefTRIAXone (ROCEPHIN) 1 g in 0.9% sodium chloride (MBP/ADV) 50 mL  1 g IntraVENous Q24H  
 azithromycin (ZITHROMAX) tablet 500 mg  500 mg Oral Q24H  pantoprazole (PROTONIX) 40 mg in sodium chloride 0.9% 10 mL injection  40 mg IntraVENous Q12H  
 0.9% sodium chloride infusion  125 mL/hr IntraVENous CONTINUOUS  
 indomethacin (INDOCIN) capsule 50 mg  50 mg Oral TID WITH MEALS  
 amitriptyline (ELAVIL) tablet 10 mg  10 mg Oral QHS  [START ON 8/30/2018] clopidogrel (PLAVIX) tablet 75 mg  75 mg Oral DAILY  [START ON 8/30/2018] isosorbide mononitrate ER (IMDUR) tablet 30 mg  30 mg Oral DAILY  [START ON 8/30/2018] lisinopril (PRINIVIL, ZESTRIL) tablet 5 mg  5 mg Oral DAILY  nitroglycerin (NITROSTAT) tablet 0.4 mg  0.4 mg SubLINGual Q5MIN PRN  pramipexole (MIRAPEX) tablet 0.5 mg  0.5 mg Oral QHS  traMADol (ULTRAM) tablet 50 mg  50 mg Oral Q6H PRN  
 lidocaine (XYLOCAINE) 10 mg/mL (1 %) injection 1 mL  10 mg IntraDERMal ONCE  
 
 
 
Objective:  
 
Vitals:  
 08/29/18 1359 08/29/18 1429 08/29/18 1616 08/29/18 1925 BP: 143/65 143/68 123/85 135/76 Pulse: (!) 104 (!) 101 (!) 101 (!) 101 Resp: 23  22 22 Temp:   99.8 °F (37.7 °C) 99 °F (37.2 °C) SpO2: 95% 94%  95% Weight:      
Height: PHYSICAL EXAM  
 
Constitutional:  the patient is well developed and in no acute distress EENMT:  Sclera clear, pupils equal, oral mucosa moist 
Respiratory: decreased sounds in bases R > left and more dullness to percussion 1/2 up both sides of chest.  
Cardiovascular:  RRR without M,G,R 
Gastrointestinal: soft and non-tender; with positive bowel sounds. Musculoskeletal: warm without cyanosis. There is no lower leg edema. Skin:  no jaundice or rashes, no wounds Neurologic: no gross neuro deficits Psychiatric:  alert and oriented x 3 CT chest:  Noted to have b/l effusion left more than right side. Impression: 1. No pulmonary emboli. 2. Lateral pleural effusion. 3. Bilateral infiltrate. 4. Pericardial effusion Recent Labs  
   08/29/18 
 1118 WBC  13.9* HGB  10.7* HCT  35.1* PLT  635* Recent Labs  
   08/29/18 
 1118 NA  136  
K  4.0  
CL  97* GLU  129* CO2  27 BUN  14  
CREA  0.86 CA  9.0  
TROIQ  <0.02* ALB  2.5* TBILI  0.9 ALT  69* SGOT  72* No results for input(s): PH, PCO2, PO2, HCO3 in the last 72 hours. Recent Labs  
   08/29/18 
 1286 LAC  0.9 Assessment:  (Medical Decision Making) Hospital Problems  Date Reviewed: 8/29/2018 Codes Class Noted POA * (Principal)Acute respiratory failure with hypoxia ([facility] [address] ICD-10-CM: J96.01 
ICD-9-CM: 518.81  8/29/2018 Yes  
 --likely from pericardial effusion +-pericarditis Community acquired bacterial pneumonia ICD-10-CM: J15.9 ICD-9-CM: 482.9  8/29/2018 Yes  
 --feel more likely pleural effusion vs infiltrates on CT Pericarditis with effusion ICD-10-CM: I31.9 ICD-9-CM: 423.9  8/29/2018 Yes  
 --reported and did have 2 bouts per patient. Pleural effusion, right ICD-10-CM: J90 ICD-9-CM: 511.9  8/29/2018 Unknown Pleural effusion, left ICD-10-CM: J90 ICD-9-CM: 511.9  8/29/2018 Unknown Hypoxemia ICD-10-CM: R09.02 
ICD-9-CM: 799.02  8/29/2018 Unknown On oxygen at 4 LPM  
 Thrush ICD-10-CM: B37.0 ICD-9-CM: 112.0  8/29/2018 Unknown Peptic ulcer disease ICD-10-CM: K27.9 ICD-9-CM: 533.90  8/29/2018 Yes Recent history. DAVID (obstructive sleep apnea) ICD-10-CM: Y81.89 
ICD-9-CM: 327.23  11/14/2016 Yes  
   
 BPH (benign prostatic hyperplasia) ICD-10-CM: N40.0 ICD-9-CM: 600.00  9/9/2015 Yes Hypertension ICD-10-CM: I10 
ICD-9-CM: 401.9  Unknown Yes Overview Signed 8/21/2015  8:46 AM by Mike Dobson  
  controlled w/ med Restless leg syndrome ICD-10-CM: G25.81 ICD-9-CM: 333.94  Unknown Yes Overview Signed 8/21/2015  8:52 AM by Mike Dobson  
  manged with medications IBS (irritable bowel syndrome) ICD-10-CM: K58.9 ICD-9-CM: 564.1  Unknown Yes Overview Signed 8/21/2015  8:52 AM by Mike Dobson  
  manged with medications Plan:  (Medical Decision Making) --cxr noted with b/l effusion. Will plan to tap both chest and send samples for analysis. --get cardiology to see patient, wants to know if needs to continue the treatment for pericarditis or not. --has thrush in mouth and unclear etiology --> did not get get steroids, abx, immunosuppressives before today. Currently getting abx now. Will check HIV status and start magic mouth wash. Not if cxr has resolution of ?infiltrartes then consider stopping abx. procal is 0.1.  
--get Echo in AM to check pericardial effusion. Should have better image when pleural effusion is cleared. --taper oxygen as tolerated. --continue remaining treatment. More than 50% of the time documented was spent in face-to-face contact with the patient and in the care of the patient on the floor/unit where the patient is located. Thank you very much for this referral.  We appreciate the opportunity to participate in this patient's care. Will follow along with above stated plan.  
 
Monica Gomez MD

## 2018-08-31 NOTE — PROGRESS NOTES
[facility] CARDIOLOGY PROGRESS NOTE 
      
 
8/31/2018 7:52 AM 
 
Admit Date: 8/29/2018 Subjective:  
Feeling much better, cp has nearly resolved with indomethacin and colchicine. Unfortunately, has diarrhea with colchicine and was the reason he stopped it previously. ROS: 
Cardiovascular:  As noted above Objective:  
  
Vitals:  
 08/30/18 1931 08/30/18 2311 08/31/18 2458 08/31/18 7038 BP: 138/68 143/74  140/74 Pulse: 73 86  90 Resp: 18 18 18 Temp: 97.9 °F (36.6 °C) 97.7 °F (36.5 °C)  97.9 °F (36.6 °C) SpO2: 94%   95% Weight:   171 lb 14.4 oz (78 kg) Height:      
 
 
Physical Exam: 
General-No Acute Distress Neck- supple, no JVD 
CV- regular rate and rhythm no rub. 2/6 early systolic murmur LUSB, no diastolic murmur Lung- diminished bilateral bases Abd- soft, nontender, nondistended Ext- no edema bilaterally. Skin- warm and dry Data Review:  
Recent Labs 08/31/18 
 9456  08/30/18 
 0556  08/29/18 
 1118 NA   --   141  136 K   --   3.5  4.0  
BUN   --   9  14 CREA   --   0.58*  0.86 GLU   --   104*  129* WBC  7.3  7.8  13.9* HGB  8.8*  8.9*  10.7* HCT  29.6*  29.1*  35.1*  
PLT  507*  452*  635* TROIQ   --    --   <0.02* Echo - EF 55-60%, indeterminate DF, mild MR, mild to moderate AI, trivial posterior effusion. CRP 14.4 (down from 21)  Sed rate 62 Assessment/Plan:  
 
Principal Problem: 
  Acute respiratory failure with hypoxia ([address] (8/29/2018) Management per primary team 
 
Active Problems: Hypercholesterolemia () Hypertension () Restless leg syndrome () IBS (irritable bowel syndrome) () 
   
 
  BPH (benign prostatic hyperplasia) (9/9/2015) DAVID (obstructive sleep apnea) (11/14/2016) Community acquired bacterial pneumonia (8/29/2018) On antibiotic therapy Peptic ulcer disease (8/29/2018)   Patient has history of GI bleed, currently hgb has fallen from 14 to 9 during this admission, may require transfusion (last in May of this year), he has not noted melena but notes ongoing diarrhea. Pericarditis with effusion (8/29/2018) Effusion and pleuritic cp nearly resolved, white count significantly improved, CRP coming down, KATHY pending. Sed rate did climb again to 62 with ongoing inflammation of multiple sources. Not tolerating colchicine very well due to diarrhea, but developed worsening of symptoms without it, also on antibiotics which may worsen diarrhea. Suspicion low but will check C diff, if negative might consider anti diarrheal agent while on therapy? ? Upon discharge on indomethacin and colchicine, arrange follow up with primary cardiologist, Dr Josie Vegas, at [facility]. Hemoptysis (8/29/2018) None currently Pleural effusion, right (8/29/2018) Pleural effusion, left (8/29/2018) Improved after thoracentesis with improved dyspnea Hypoxemia (8/29/2018) improving Thrush (8/29/2018) Danelle Rodriguez MD 
8/31/2018 7:52 AM

## 2018-08-31 NOTE — PROGRESS NOTES
Pt sat up on side of bed for thoracentesis. Consent obtained. Time out performed. Pts vitals monitored throughout procedure. Bilateral ultrasound done and pic taken of pleural fluid.  ~700 ml raymon colored pleural fluid from L.  ~600 ml yellow pleural fluid from R.  Pt tolerated procedure well with no adverse rxn. Specimens sent to the lab x 4 (chemistries only on L and all 3 on R) and labeled appropriately. Sites dressed appropriately and report given to pts RN April.    Lung sliding done and ultrasound findings reviewed by MD. CXR ordered post procedure by md.

## 2018-08-31 NOTE — PROGRESS NOTES
LATE NOTE: In accordance with Medicare Guidelines, a copy of the Important Letter from Medicare was presented to the patient/patients spouse in anticipation of expected discharge within 48 hours. An oral explanation was provided and all questions were answered. A copy of the Important Letter from Medicare was signed by patients spouse and placed in the patient's medical chart, and a signed copy of the Important Letter from Medicare was provided to the patient. Care Managers were notified.

## 2018-08-31 NOTE — PROCEDURES
PROCEDURE:  DIAGNOSTIC THORACENTESIS, THERAPEUTIC THORACENTESIS       PRE-OP DIAGNOSIS:  R PLEURAL EFFUSION    POST-OP DIAGNOSIS:  R PLEURAL EFFUSION    VOLUME REMOVED:    600cc    ANESTHESIA:    LOCAL ANESTHESIA WITH 1% LIDOCAINE 10 CC TOTAL. CHEST ULTRASOUND FINDINGS:    A Turbo-M, Sonosite ultrasound with a 5-16 mHz probe was used to image the chest and localize the pleural effusion on the right chest.    A moderate anechoic space was seen on the right consistent with an uncomplicated pleural effusion. DESCRIPTION OF PROCEDURE:    After obtaining informed consent and localizing the safest location for thoracentesis, the  8th intercostal space was marked with a blunt, plastic needle cap in the mid scapular line. An [employer] [id number] Pleral-Seal thoracentesis kit was used to perform the procedure. The skin was cleansed with the supplied chlorhexidine swab and then draped in the usual fashion. Using the previously marked location as a guide, a 22 G 1.5 inch needle was used to inject 1% lidocaine into the skin and subcutaneous tissue, as well as onto the underlying rib and inter-costal muscles. Pleural fluid was aspirated to assure proper location and additional lidocaine was injected into the pleural space prior to removing the anesthesia needle. A 3mm incision was then made with the supplied scalpel in the usual fashion to facilitate the insertion of the thoracentesis needle. The needle with an 8 Bhutanese thoracentesis catheter was then introduced into the chest through the previously made incision in the usual fashion, the rib localized with the needle, and the catheter then marched over the rib into the pleural space. After aspirating fluid, the thoracentesis catheter was then placed into the chest using the needle itself as a trocar. The needle was then removed and the catheter was attached to the supplied tubing without complication.     600 cc of serosanguinous fluid was aspirated and sent for analysis. Fluid was sent for the following tests:      LDH  Total Protein  Glucose  Cell count with differential  Routine culture and Gram stain  Cytology  AFB  Fungus      Post procedure US confirmed complete drainage of the effusion and presence of lung sliding, ruling out pneumothorax. ([id number])    EBL:     <3HJ    COMPLICATIONS:      None  Patient is on plavix. Discussed the potential increase in risk of bleeding on this medication but the need to hold 7 days before out of his system. He was ok accepting the increased bleeding risk in order to have the procedure completed now. No obvious bleeding.        Efrem Overton MD

## 2018-08-31 NOTE — PROGRESS NOTES
Problem: Mobility Impaired (Adult and Pediatric) Goal: *Acute Goals and Plan of Care (Insert Text) Discharge Goals: 
(1.)Mr. Bogdan Wiggins will tolerated 30 minutes of therapeutic exercises/activities for improved CV endurance for carryover to functional activities within 7 treatment days. (2.)Mr. Bogdan Wiggins will transfer from bed to chair and chair to bed with INDEPENDENT using the least restrictive device within 7 treatment day(s). Goal met 8/31/18 (3.)Mr. Bogdan Wiggins will ambulate with INDEPENDENT for 500+ feet with the least restrictive device within 7 treatment day(s) while on RA with stable O2 sat for return to PLOF.  
________________________________________________________________________________________________ PHYSICAL THERAPY: Daily Note, Treatment Day: 1st, AM 8/31/2018 INPATIENT: Hospital Day: 3 [place] / Product Type: Medicare /  
  
NAME/AGE/GENDER: Alan Torres is a 78 y.o. male PRIMARY DIAGNOSIS: Pleural effusion [J90] Acute respiratory failure with hypoxia (HCC) Acute respiratory failure with hypoxia (HCC) Procedure(s) (LRB): ULTRASOUND (Bilateral) THORACENTESIS (N/A) ICD-10: Treatment Diagnosis:  
 · Generalized Muscle Weakness (M62.81) · Difficulty in walking, Not elsewhere classified (R26.2) Precaution/Allergies: 
Augmentin [amoxicillin-pot clavulanate] and Oxycodone ASSESSMENT:  
 
Mr. Bogdan Wiggins is supine in bed upon contact and agreeable to PT treatment this morning. Pt just finished with bedside thoracentesis with stable BP reported prior to mobility. Pt transitioned supine to sit EOB independently and performed STS independently. Pt ambulated 500 ft in hallway with supervision on RA. O2 sat at 97% following activity. Pt to continue to ambulate halls ad og and discussed safety. Pt and wife at bedside verbalized understanding.  Pt is making excellent progress towards goals and may benefit from 1 more session while in acute setting. This section established at most recent assessment PROBLEM LIST (Impairments causing functional limitations): 1. Decreased Strength 2. Decreased ADL/Functional Activities 3. Decreased Ambulation Ability/Technique 4. Decreased Balance 5. Decreased Activity Tolerance 6. Decreased Pacing Skills 7. Increased Fatigue 8. Increased Shortness of Breath 9. Decreased [place] with Home Exercise Program 
 INTERVENTIONS PLANNED: (Benefits and precautions of physical therapy have been discussed with the patient.) 1. Balance Exercise 2. Bed Mobility 3. Family Education 4. Gait Training 5. Home Exercise Program (HEP) 6. Neuromuscular Re-education/Strengthening 7. Therapeutic Activites 8. Therapeutic Exercise/Strengthening 9. Transfer Training TREATMENT PLAN: Frequency/Duration: 3 times a week for duration of hospital stay Rehabilitation Potential For Stated Goals: Excellent RECOMMENDED REHABILITATION/EQUIPMENT: (at time of discharge pending progress): Due to the probability of continued deficits (see above) this patient will not likely need continued skilled physical therapy after discharge. Equipment:  
? None at this time HISTORY:  
History of Present Injury/Illness (Reason for Referral): 
See H&P below 78 M with PMH of CAD s/p stent, GI Bleed, Peptic ulcers, HLD, HTN was sent to the ER by PCP for abnormal Labs and suspected infection and SOB. Pt States SOB started 4 days ago, has been gradually worsening, persistent, worse with activity, better at rest, associated with Pleuritic CP and cough productive of thick blood streaked sputum. Also has had Throat pain and Fever ranged from .7 for the last few days.  Pt was recently seen twice at [facility] and was admitted for Chest pain 2/2 Acute Viral Pericarditis (family members had viral infection which was transmitted to pt) and was treated initially with Colchicine, and later NSAID was added on 2nd visit . He has been having diarrhea since started Colchicine. Today was seen at PCP office and strep Throat/Mono resulted -ve. In ER he was hypoxic to 70s and required 4lnc with improved sats. CXR showed LLL Pneumonia. Given Vanc and Zosyn and hospitalist asked to admit.  
  
Pt still feels SOB and tired. Wife at bedside also providing history. Prior record reviewed in Care Everywhere, Pt had nl EF bit did develop Moderate Pericardial Effusion. His BP is stable in ER. LA is 2.2, WBC 13.9. Could not take steroids for Pericarditis due to recent GI Bleed. 
  
Past Medical History/Comorbidities:  
Mr. Dileep Rojas  has a past medical history of Allergic rhinitis; Aortic insufficiency; Aortic sclerosis; BPH with urinary obstruction; CAD (coronary artery disease); Chronic pain; Depression; Dyslipidemia; Former pipe smoker; GERD (gastroesophageal reflux disease); GI bleed; History of depression; Hypercholesterolemia; Hypertension; IBS (irritable bowel syndrome); Macular degeneration; Old MI (myocardial infarction); Osteoarthritis; PUD (peptic ulcer disease) (3/2011); Restless leg syndrome; Seasonal allergic rhinitis; and Unspecified sleep apnea. He also has no past medical history of Adverse effect of anesthesia; Difficult intubation; Malignant hyperthermia due to anesthesia; Nausea & vomiting; or Pseudocholinesterase deficiency. Mr. Dileep Rojas  has a past surgical history that includes hx cataract removal (Bilateral); hx tonsil and adenoidectomy; hx turp (05/15; 07/15); hx colonoscopy; hx heart catheterization (2010, 3/2011,4/2011); hx heart catheterization (9/2014); hx shoulder arthroscopy (Right, 2010 approx); and hx shoulder replacement (Right, 2015). Social History/Living Environment:  
Home Environment: Private residence # Steps to Enter: 1 One/Two Story Residence: One story Living Alone: No 
Support Systems: Spouse/Significant Other/Partner Patient Expects to be Discharged to[de-identified] Private residence Current DME Used/Available at Home: CPAP Prior Level of Function/Work/Activity: 
Lives with wife, indep with all mobility, 0 falls, drives Number of Personal Factors/Comorbidities that affect the Plan of Care: 3+: HIGH COMPLEXITY EXAMINATION:  
Most Recent Physical Functioning:  
Gross Assessment: 
AROM: Within functional limits Strength: Generally decreased, functional 
Coordination: Within functional limits Posture: 
  
Balance: 
Sitting: Intact; Without support Standing: Intact; Without support Bed Mobility: 
Supine to Sit: Independent Wheelchair Mobility: 
  
Transfers: 
Sit to Stand: Independent Stand to Sit: Independent Gait: 
  
Speed/Alisa: Slow Distance (ft): 500 Feet (ft) Assistive Device:  (none) Ambulation - Level of Assistance: Supervision Interventions: Safety awareness training Body Structures Involved: 1. Heart 2. Lungs 3. Bones 4. Muscles Body Functions Affected: 1. Cardio 2. Respiratory 3. Neuromusculoskeletal 
4. Movement Related Activities and Participation Affected: 1. General Tasks and Demands 2. Mobility 3. Self Care 4. Domestic Life 5. Interpersonal Interactions and Relationships 6. Community, Social and Malinta Rogers Number of elements that affect the Plan of Care: 4+: HIGH COMPLEXITY CLINICAL PRESENTATION:  
Presentation: Evolving clinical presentation with changing clinical characteristics: MODERATE COMPLEXITY CLINICAL DECISION MAKIN [facility] [address] [address] AM-PAC 6 Clicks Basic Mobility Inpatient Short Form How much difficulty does the patient currently have. .. Unable A Lot A Little None 1. Turning over in bed (including adjusting bedclothes, sheets and blankets)? [] 1   [] 2   [] 3   [x] 4  
2. Sitting down on and standing up from a chair with arms ( e.g., wheelchair, bedside commode, etc.)   [] 1   [] 2   [] 3   [x] 4 3. Moving from lying on back to sitting on the side of the bed? [] 1   [] 2   [] 3   [x] 4 How much help from another person does the patient currently need. .. Total A Lot A Little None 4. Moving to and from a bed to a chair (including a wheelchair)? [] 1   [] 2   [] 3   [x] 4  
5. Need to walk in hospital room? [] 1   [] 2   [] 3   [x] 4  
6. Climbing 3-5 steps with a railing? [] 1   [] 2   [x] 3   [] 4  
© 2007, Trustees of [address] [place] [address] [address] [id number], under license to [employer]. All rights reserved Score:  Initial: 23 Most Recent: X (Date: -- ) Interpretation of Tool:  Represents activities that are increasingly more difficult (i.e. Bed mobility, Transfers, Gait). Score 24 23 22-20 19-15 14-10 9-7 6 Modifier CH CI CJ CK CL CM CN   
 
? Mobility - Walking and Moving Around:  
  - CURRENT STATUS: CI - 1%-19% impaired, limited or restricted  - GOAL STATUS: CH - 0% impaired, limited or restricted  - D/C STATUS:  ---------------To be determined--------------- [place] / [place] B / Product Type: Medicare /   
 
Medical Necessity:    
· Patient is expected to demonstrate progress in strength, balance, coordination and functional technique to decrease assistance required with gait, transfers, and tolerance for functional mobility. Elena Menezes Reason for Services/Other Comments: 
· Patient continues to require skilled intervention due to decreased functional tolerance and decreased cardiopulmonary endurance affecting participation in basic ADLs and functional tasks. Use of outcome tool(s) and clinical judgement create a POC that gives a: Clear prediction of patient's progress: LOW COMPLEXITY  
  
 
 
 
TREATMENT:  
(In addition to Assessment/Re-Assessment sessions the following treatments were rendered) Pre-treatment Symptoms/Complaints:  none Pain: Initial:  
Pain Intensity 1: 0  Post Session:  0/10 Therapeutic Activity: (    10 minutes): Therapeutic activities including Bed transfers, Ambulation on level ground and pacing techniques, energy conservation to improve mobility, strength, balance and coordination. Required minimal Safety awareness training to promote dynamic balance in standing. Braces/Orthotics/Lines/Etc:  
· none Treatment/Session Assessment:   
· Response to Treatment:  Ambulated 500 ft with supervision, O2 sat 97%. · Interdisciplinary Collaboration:  
o Physical Therapist 
o Registered Nurse · After treatment position/precautions:  
o Up in chair 
o Bed/Chair-wheels locked 
o Bed in low position 
o Call light within reach 
o RN notified 
o Family at bedside · Compliance with Program/Exercises: Will assess as treatment progresses. · Recommendations/Intent for next treatment session: \"Next visit will focus on advancements to more challenging activities and reduction in assistance provided\". Total Treatment Duration: PT Patient Time In/Time Out Time In: 0902 Time Out: 1050 Annamarie Agudelo

## 2018-08-31 NOTE — PROGRESS NOTES
Pt is in room alert and oriented. rr are even and unlabored. Crackles noted jay. 4L NC. IV fluids infusing. abd is soft bowel sounds are active. He has stated he has diarrhea with some abd tenderness noted. Pt stool is dark green in appearance. Sample sent off for testing. He is stable. Able to ambulate with out assistance. Wife at bedside. safety measures in place will continue to monitor.

## 2018-08-31 NOTE — PROGRESS NOTES
Pt had thoracentesis this AM. Tolerated well. No adverse reaction noted. IV infiltrated. IV fluids D/C. He is stable safety measures in place will continue to monitor.

## 2018-08-31 NOTE — PROGRESS NOTES
Akira Interiano Admission Date: 8/29/2018 Daily Progress Note: 8/31/2018 Pt is a 79 yo  male with a history of CAD, HTN, HLD, PUD, and recent pericarditis after visiting her daughter in [place] whose 3 small children had viral illnesses. He was hospitalized at [facility] x 2 (8/4 and 8/14)and treated with colchicine and ibuprofen. Pt presented to the ER on 8/29 with acute respiratory failure and LLL pneumonia and bilateral pleural effusions. He was admitted and we were consulted to assist. Pt underwent L thoracentesis with 1100 mL removed. Only a small R pleural effusion noted which was too small to tap. Pleural fluid negative but sputum with heavy GPC. Subjective: On RA with appropriate sats. Afebrile. Pleural fluid negative but sputum with heavy GPC. Review of Systems Constitutional: positive for fatigue Respiratory: negative for sputum or dyspnea on exertion Current Facility-Administered Medications Medication Dose Route Frequency  colchicine tablet 0.6 mg  0.6 mg Oral BID  sodium chloride (NS) flush 5-10 mL  5-10 mL IntraVENous Q8H  
 sodium chloride (NS) flush 5-10 mL  5-10 mL IntraVENous PRN  
 acetaminophen (TYLENOL) tablet 650 mg  650 mg Oral Q4H PRN  
 diphenhydrAMINE (BENADRYL) injection 12.5 mg  12.5 mg IntraVENous Q4H PRN  
 ondansetron (ZOFRAN) injection 4 mg  4 mg IntraVENous Q4H PRN  
 bisacodyl (DULCOLAX) tablet 5 mg  5 mg Oral DAILY PRN  
 enoxaparin (LOVENOX) injection 40 mg  40 mg SubCUTAneous Q24H  cefTRIAXone (ROCEPHIN) 1 g in 0.9% sodium chloride (MBP/ADV) 50 mL  1 g IntraVENous Q24H  
 azithromycin (ZITHROMAX) tablet 500 mg  500 mg Oral Q24H  pantoprazole (PROTONIX) 40 mg in sodium chloride 0.9% 10 mL injection  40 mg IntraVENous Q12H  
 0.9% sodium chloride infusion  75 mL/hr IntraVENous CONTINUOUS  
 indomethacin (INDOCIN) capsule 50 mg  50 mg Oral TID WITH MEALS  
 amitriptyline (ELAVIL) tablet 10 mg  10 mg Oral QHS  clopidogrel (PLAVIX) tablet 75 mg  75 mg Oral DAILY  isosorbide mononitrate ER (IMDUR) tablet 30 mg  30 mg Oral DAILY  lisinopril (PRINIVIL, ZESTRIL) tablet 5 mg  5 mg Oral DAILY  nitroglycerin (NITROSTAT) tablet 0.4 mg  0.4 mg SubLINGual Q5MIN PRN  pramipexole (MIRAPEX) tablet 0.5 mg  0.5 mg Oral QHS  traMADol (ULTRAM) tablet 50 mg  50 mg Oral Q6H PRN  
 magic mouthwash (JOSÉ) oral suspension 5 mL  5 mL Oral Q6H Objective:  
 
Vitals:  
 08/30/18 1931 08/30/18 2311 08/31/18 1810 08/31/18 2012 BP: 138/68 143/74  140/74 Pulse: 73 86  90 Resp: 18 18 18 Temp: 97.9 °F (36.6 °C) 97.7 °F (36.5 °C)  97.9 °F (36.6 °C) SpO2: 94%   95% Weight:   171 lb 14.4 oz (78 kg) Height:      
 
Intake and Output:  
08/29 1901 - 08/31 0700 In: 8827 [P.O.:840; I.V.:2928] Out: 100 [Urine:100] 08/31 0701 - 08/31 1900 In: 240 [P.O.:240] Out: - Physical Exam:         
Constitutional: the patient is well develop HEENT: Sclera clear, pupils equal, oral mucosa moist 
Lungs: dull left base- + egophony, clear anteriorly on NC at night Cardiovascular: RRR without M,G,R 
Abd/GI: soft and non-tender; with positive bowel sounds. Ext: warm without cyanosis. There is no lower leg edema. Musculoskeletal: moves all four extremities with equal strength Skin: no jaundice or rashes, no wounds Neuro: no gross neuro deficits Lines/Drains: IV 
Nutrition: LAB Recent Labs 08/31/18 
 3858  08/30/18 
 0556  08/29/18 
 1118 WBC  7.3  7.8  13.9* HGB  8.8*  8.9*  10.7* HCT  29.6*  29.1*  35.1*  
PLT  507*  452*  635* Recent Labs 08/31/18 
 9164  08/30/18 
 0556  08/29/18 
 1118 NA  143  141  136  
K  3.7  3.5  4.0  
CL  106  105  97* CO2  29  27  27 GLU  93  104*  129* BUN  6*  9  14 CREA  0.52*  0.58*  0.86  
TROIQ   --    --   <0.02* Sputum 8/29 GRAM STAIN PENDING    Preliminary Culture result:  (A)    Preliminary HEAVY GRAM POSITIVE COCCI IDENTIFICATION AND SUSCEPTIBILITY TO FOLLOW Echocardiogram 
SUMMARY: 
 
-  Left ventricle: Systolic function was normal. Ejection fraction was 
estimated in the range of 55 % to 60 %. There were no regional wall motion 
abnormalities. -  Left atrium: The atrium was mildly dilated. -  Aortic valve: There was mild to moderate regurgitation. [place], systemic arteries: There was mild dilatation of the ascending  
aorta. -  Pericardium: A trace to small pericardial effusion was identified Posterior to the heart of no hemodynamic significance. There was a left pleural  
Effusion. CXR Assessment:  
 
Patient Active Problem List  
Diagnosis Code  Allergic rhinitis J30.9  Hypercholesterolemia E78.00  Hypertension I10  Restless leg syndrome G25.81  
 IBS (irritable bowel syndrome) K58.9  BPH (benign prostatic hyperplasia) N40.0  Arthritis of shoulder region, right, degenerative M19.011  
 Coronary artery disease involving native coronary artery of native heart without angina pectoris I25.10  
 DAVID (obstructive sleep apnea) G47.33  
 Upper GI bleed K92.2  Acute blood loss anemia D62  Community acquired bacterial pneumonia J15.9  Peptic ulcer disease K27.9  Pericarditis with effusion I31.9  Pleural effusion, right J90  
 Pleural effusion, left J90  Thrush B37.0 Plan Hospital Problems  Date Reviewed: 8/30/2018 Codes Class Noted POA Community acquired bacterial pneumonia ICD-10-CM: J15.9 ICD-9-CM: 482.9  8/29/2018 Yes With GPC on abx Peptic ulcer disease (Chronic) ICD-10-CM: K27.9 ICD-9-CM: 533.90  8/29/2018 Yes On PPI Pericarditis with effusion ICD-10-CM: I31.9 ICD-9-CM: 423.9  8/29/2018 Yes Small on echo Upon discharge on indomethacin and colchicine, arrange follow up with primary cardiologist, Dr Sunny Lowe, at [facility].    
 Pleural effusion, right ICD-10-CM: J90 
 ICD-9-CM: 511.9  8/29/2018 Yes Small on 8/29- no tap Pleural effusion, left ICD-10-CM: J90 ICD-9-CM: 511.9  8/29/2018 Yes 100 cc removed on left Thrush ICD-10-CM: B37.0 ICD-9-CM: 112.0  8/29/2018 Yes DAVID (obstructive sleep apnea) (Chronic) ICD-10-CM: G47.33 
ICD-9-CM: 327.23  11/14/2016 Yes On CPAP Q HS  
 BPH (benign prostatic hyperplasia) (Chronic) ICD-10-CM: N40.0 ICD-9-CM: 600.00  9/9/2015 Yes Hypertension (Chronic) ICD-10-CM: I10 
ICD-9-CM: 401.9  Unknown Yes Overview Signed 8/21/2015  8:46 AM by Burke Bishop  
  controlled w/med 
  
  
   
  
 
-- exudative effusion (no LDH seen). Could be parapneumonic or related to pericarditis. On abx for PNA. Cardiology addressing pericarditis. Follow up TTE report when available. -- PT/OT 
--effusions last evaluated on 8/29. May need to re-evaluate left effusion  
--continue zithromax and rocephin D 2.  
--on indomethacin, not on prednisone secondary to recent GIB with peptic ulcer --cardiology following -- stop IV fluids Gabi Roque NP More than 50% of time documented was spent in face-to-face contact with the patient and in the care of the patient on the floor/unit where the patient is located. Lungs:  Decreased at right base corresponding with elevated hemidiaphragm on CXR. Heart:  RRR with no Murmur/Rubs/Gallops Additional Comments: Will repeat US chest today to ensure no significant recurrence of pleural fluid. GPC in sputum. Do not feel that MRSA coverage is necessary at present given his clinical appearance. Will follow up final speciation. I have spoken with and examined the patient. I agree with the above assessment and plan as documented.  
 
Flaquito Bae MD

## 2018-08-31 NOTE — PROCEDURES
PROCEDURE:  DIAGNOSTIC THORACENTESIS, THERAPEUTIC THORACENTESIS       PRE-OP DIAGNOSIS:  L PLEURAL EFFUSION    POST-OP DIAGNOSIS:  L PLEURAL EFFUSION    VOLUME REMOVED:    700cc    ANESTHESIA:    LOCAL ANESTHESIA WITH 1% LIDOCAINE 10 CC TOTAL. CHEST ULTRASOUND FINDINGS:    A Turbo-M, Sonosite ultrasound with a 5-16 mHz probe was used to image the chest and localize the pleural effusion on the left chest.    A large anechoic space was seen on the left consistent with an uncomplicated pleural effusion. DESCRIPTION OF PROCEDURE:    After obtaining informed consent and localizing the safest location for thoracentesis, the  8th intercostal space was marked with a blunt, plastic needle cap in the mid scapular line. An [employer] AK-0100 Pleral-Seal thoracentesis kit was used to perform the procedure. The skin was cleansed with the supplied chlorhexidine swab and then draped in the usual fashion. Using the previously marked location as a guide, a 22 G 1.5 inch needle was used to inject 1% lidocaine into the skin and subcutaneous tissue, as well as onto the underlying rib and inter-costal muscles. Pleural fluid was aspirated to assure proper location and additional lidocaine was injected into the pleural space prior to removing the anesthesia needle. A 3mm incision was then made with the supplied scalpel in the usual fashion to facilitate the insertion of the thoracentesis needle. The needle with an 8 Turkmen thoracentesis catheter was then introduced into the chest through the previously made incision in the usual fashion, the rib localized with the needle, and the catheter then marched over the rib into the pleural space. After aspirating fluid, the thoracentesis catheter was then placed into the chest using the needle itself as a trocar. The needle was then removed and the catheter was attached to the supplied tubing without complication.     700 cc of serosanguinous fluid was aspirated and sent for analysis. Fluid was sent for the following tests:    LDH  Total Protein  Glucose    Post procedure US confirmed complete drainage of the effusion and presence of lung sliding, ruling out pneumothorax. ([id number])    EBL:     <9IO    COMPLICATIONS:    None  Patient is on plavix. Discussed the potential increase in risk of bleeding on this medication but the need to hold 7 days before out of his system. He was ok accepting the increased bleeding risk in order to have the procedure completed now. No obvious bleeding.          Pamela Bergman MD

## 2018-08-31 NOTE — PROGRESS NOTES
Problem: Falls - Risk of 
Goal: *Absence of Falls Document Gloria Arnett Fall Risk and appropriate interventions in the flowsheet. Outcome: Progressing Towards Goal 
Fall Risk Interventions: 
Mobility Interventions: Patient to call before getting OOB Medication Interventions: Patient to call before getting OOB Elimination Interventions: Call light in reach

## 2018-08-31 NOTE — PROGRESS NOTES
Problem: Interdisciplinary Rounds Goal: Interdisciplinary Rounds Interdisciplinary team rounds were held 8/31/2018 with the following team members:Care Management, Physical Therapy, Physician and Clinical Coordinator and the patient and spouse. Plan of care discussed. See clinical pathway and/or care plan for interventions and desired outcomes.

## 2018-09-01 ENCOUNTER — APPOINTMENT (OUTPATIENT)
Dept: GENERAL RADIOLOGY | Age: 78
DRG: 177 | End: 2018-09-01
Attending: INTERNAL MEDICINE
Payer: MEDICARE

## 2018-09-01 LAB
ANION GAP SERPL CALC-SCNC: 7 MMOL/L (ref 7–16)
ATRIAL RATE: 85 BPM
BACTERIA SPEC CULT: ABNORMAL
BACTERIA SPEC CULT: ABNORMAL
BACTERIA SPEC CULT: NORMAL
BUN SERPL-MCNC: 7 MG/DL (ref 8–23)
CALCIUM SERPL-MCNC: 8 MG/DL (ref 8.3–10.4)
CALCULATED P AXIS, ECG09: 15 DEGREES
CALCULATED R AXIS, ECG10: -11 DEGREES
CALCULATED T AXIS, ECG11: 124 DEGREES
CHLORIDE SERPL-SCNC: 106 MMOL/L (ref 98–107)
CO2 SERPL-SCNC: 29 MMOL/L (ref 21–32)
CREAT SERPL-MCNC: 0.52 MG/DL (ref 0.8–1.5)
DIAGNOSIS, 93000: NORMAL
ERYTHROCYTE [DISTWIDTH] IN BLOOD BY AUTOMATED COUNT: 18 %
GLUCOSE FLD-MCNC: 118 MG/DL
GLUCOSE FLD-MCNC: 120 MG/DL
GLUCOSE SERPL-MCNC: 94 MG/DL (ref 65–100)
GRAM STN SPEC: ABNORMAL
GRAM STN SPEC: NORMAL
GRAM STN SPEC: NORMAL
HCT VFR BLD AUTO: 28.8 % (ref 41.1–50.3)
HGB BLD-MCNC: 8.7 G/DL (ref 13.6–17.2)
LDH FLD L TO P-CCNC: 120 U/L
LDH FLD L TO P-CCNC: 125 U/L
MCH RBC QN AUTO: 22.5 PG (ref 26.1–32.9)
MCHC RBC AUTO-ENTMCNC: 30.2 G/DL (ref 31.4–35)
MCV RBC AUTO: 74.4 FL (ref 79.6–97.8)
NRBC # BLD: 0 K/UL (ref 0–0.2)
P-R INTERVAL, ECG05: 200 MS
PLATELET # BLD AUTO: 549 K/UL (ref 150–450)
PMV BLD AUTO: 10 FL (ref 9.4–12.3)
POTASSIUM SERPL-SCNC: 3.5 MMOL/L (ref 3.5–5.1)
PROT FLD-MCNC: 2.8 G/DL
PROT FLD-MCNC: 2.9 G/DL
Q-T INTERVAL, ECG07: 356 MS
QRS DURATION, ECG06: 104 MS
QTC CALCULATION (BEZET), ECG08: 423 MS
RBC # BLD AUTO: 3.87 M/UL (ref 4.23–5.6)
SERVICE CMNT-IMP: ABNORMAL
SERVICE CMNT-IMP: NORMAL
SODIUM SERPL-SCNC: 142 MMOL/L (ref 136–145)
SPECIMEN SOURCE FLD: NORMAL
TROPONIN I SERPL-MCNC: 0.02 NG/ML (ref 0.02–0.05)
TROPONIN I SERPL-MCNC: 0.02 NG/ML (ref 0.02–0.05)
TROPONIN I SERPL-MCNC: 0.03 NG/ML (ref 0.02–0.05)
VENTRICULAR RATE, ECG03: 85 BPM
WBC # BLD AUTO: 7.7 K/UL (ref 4.3–11.1)

## 2018-09-01 PROCEDURE — 93005 ELECTROCARDIOGRAM TRACING: CPT | Performed by: INTERNAL MEDICINE

## 2018-09-01 PROCEDURE — 74011250637 HC RX REV CODE- 250/637: Performed by: NURSE PRACTITIONER

## 2018-09-01 PROCEDURE — 71045 X-RAY EXAM CHEST 1 VIEW: CPT

## 2018-09-01 PROCEDURE — 74011250637 HC RX REV CODE- 250/637: Performed by: HOSPITALIST

## 2018-09-01 PROCEDURE — 74011250637 HC RX REV CODE- 250/637: Performed by: INTERNAL MEDICINE

## 2018-09-01 PROCEDURE — 99232 SBSQ HOSP IP/OBS MODERATE 35: CPT | Performed by: INTERNAL MEDICINE

## 2018-09-01 PROCEDURE — 36415 COLL VENOUS BLD VENIPUNCTURE: CPT

## 2018-09-01 PROCEDURE — 80048 BASIC METABOLIC PNL TOTAL CA: CPT

## 2018-09-01 PROCEDURE — 74011250636 HC RX REV CODE- 250/636: Performed by: INTERNAL MEDICINE

## 2018-09-01 PROCEDURE — 65660000000 HC RM CCU STEPDOWN

## 2018-09-01 PROCEDURE — 74011000250 HC RX REV CODE- 250: Performed by: INTERNAL MEDICINE

## 2018-09-01 PROCEDURE — 85027 COMPLETE CBC AUTOMATED: CPT

## 2018-09-01 PROCEDURE — C9113 INJ PANTOPRAZOLE SODIUM, VIA: HCPCS | Performed by: INTERNAL MEDICINE

## 2018-09-01 PROCEDURE — 84484 ASSAY OF TROPONIN QUANT: CPT

## 2018-09-01 RX ORDER — AMOXICILLIN 500 MG/1
500 CAPSULE ORAL EVERY 8 HOURS
Status: DISCONTINUED | OUTPATIENT
Start: 2018-09-01 | End: 2018-09-02 | Stop reason: HOSPADM

## 2018-09-01 RX ORDER — PANTOPRAZOLE SODIUM 40 MG/1
40 TABLET, DELAYED RELEASE ORAL
Status: DISCONTINUED | OUTPATIENT
Start: 2018-09-01 | End: 2018-09-02 | Stop reason: HOSPADM

## 2018-09-01 RX ADMIN — METOPROLOL SUCCINATE 100 MG: 50 TABLET, EXTENDED RELEASE ORAL at 08:38

## 2018-09-01 RX ADMIN — INDOMETHACIN 50 MG: 50 CAPSULE ORAL at 16:48

## 2018-09-01 RX ADMIN — COLCHICINE 0.6 MG: 0.6 TABLET, FILM COATED ORAL at 21:31

## 2018-09-01 RX ADMIN — PANTOPRAZOLE SODIUM 40 MG: 40 TABLET, DELAYED RELEASE ORAL at 16:48

## 2018-09-01 RX ADMIN — INDOMETHACIN 50 MG: 50 CAPSULE ORAL at 08:38

## 2018-09-01 RX ADMIN — AMOXICILLIN 500 MG: 500 CAPSULE ORAL at 15:05

## 2018-09-01 RX ADMIN — LISINOPRIL 5 MG: 5 TABLET ORAL at 08:38

## 2018-09-01 RX ADMIN — Medication 5 ML: at 11:46

## 2018-09-01 RX ADMIN — COLCHICINE 0.6 MG: 0.6 TABLET, FILM COATED ORAL at 08:38

## 2018-09-01 RX ADMIN — Medication 5 ML: at 23:59

## 2018-09-01 RX ADMIN — Medication 5 ML: at 05:19

## 2018-09-01 RX ADMIN — AMOXICILLIN 500 MG: 500 CAPSULE ORAL at 21:31

## 2018-09-01 RX ADMIN — Medication 10 ML: at 15:06

## 2018-09-01 RX ADMIN — ONDANSETRON 4 MG: 2 INJECTION INTRAMUSCULAR; INTRAVENOUS at 16:48

## 2018-09-01 RX ADMIN — ENOXAPARIN SODIUM 40 MG: 40 INJECTION, SOLUTION INTRAVENOUS; SUBCUTANEOUS at 15:05

## 2018-09-01 RX ADMIN — Medication 10 ML: at 05:19

## 2018-09-01 RX ADMIN — AMITRIPTYLINE HYDROCHLORIDE 10 MG: 10 TABLET, FILM COATED ORAL at 21:31

## 2018-09-01 RX ADMIN — INDOMETHACIN 50 MG: 50 CAPSULE ORAL at 11:46

## 2018-09-01 RX ADMIN — ONDANSETRON 4 MG: 2 INJECTION INTRAMUSCULAR; INTRAVENOUS at 11:47

## 2018-09-01 RX ADMIN — CLOPIDOGREL BISULFATE 75 MG: 75 TABLET ORAL at 08:38

## 2018-09-01 RX ADMIN — SODIUM CHLORIDE 40 MG: 9 INJECTION INTRAMUSCULAR; INTRAVENOUS; SUBCUTANEOUS at 08:38

## 2018-09-01 RX ADMIN — ISOSORBIDE MONONITRATE 30 MG: 30 TABLET, EXTENDED RELEASE ORAL at 08:38

## 2018-09-01 RX ADMIN — PRAMIPEXOLE DIHYDROCHLORIDE 0.5 MG: 1 TABLET ORAL at 21:31

## 2018-09-01 RX ADMIN — Medication 5 ML: at 21:32

## 2018-09-01 NOTE — PROGRESS NOTES
Pt resting in bed alert and oriented, denies pain or distress, breathing even and unlabored, safety measures in place, call light within reach.

## 2018-09-01 NOTE — PROGRESS NOTES
Sunny Randle Admission Date: 8/29/2018 Daily Progress Note: 9/1/2018 Pt is a 77 yo  male with a history of CAD, HTN, HLD, PUD, and recent pericarditis after visiting her daughter in [place] whose 3 small children had viral illnesses. He was hospitalized at [facility] x 2 (8/4 and 8/14)and treated with colchicine and ibuprofen. Pt presented to the ER on 8/29 with acute respiratory failure and LLL pneumonia and bilateral pleural effusions. He was admitted and we were consulted to assist. Pt underwent L thoracentesis with 1100 mL removed. Only a small R pleural effusion noted which was too small to tap. Pleural fluid negative but sputum with heavy GPC. B thoracentesis 8/31. Subjective:  
 
Janes remains on room air. Seen walking the halls. Having some chest discomfort again. Also having nausea and diarrhea from colchicine. Review of Systems Constitutional: positive for fatigue Respiratory: negative for sputum or dyspnea on exertion Current Facility-Administered Medications Medication Dose Route Frequency  amoxicillin (AMOXIL) capsule 1,000 mg  1,000 mg Oral Q12H  
 metoprolol succinate (TOPROL-XL) XL tablet 100 mg  100 mg Oral DAILY  colchicine tablet 0.6 mg  0.6 mg Oral BID  sodium chloride (NS) flush 5-10 mL  5-10 mL IntraVENous Q8H  
 sodium chloride (NS) flush 5-10 mL  5-10 mL IntraVENous PRN  
 acetaminophen (TYLENOL) tablet 650 mg  650 mg Oral Q4H PRN  
 diphenhydrAMINE (BENADRYL) injection 12.5 mg  12.5 mg IntraVENous Q4H PRN  
 ondansetron (ZOFRAN) injection 4 mg  4 mg IntraVENous Q4H PRN  
 bisacodyl (DULCOLAX) tablet 5 mg  5 mg Oral DAILY PRN  
 enoxaparin (LOVENOX) injection 40 mg  40 mg SubCUTAneous Q24H  pantoprazole (PROTONIX) 40 mg in sodium chloride 0.9% 10 mL injection  40 mg IntraVENous Q12H  
 indomethacin (INDOCIN) capsule 50 mg  50 mg Oral TID WITH MEALS  
 amitriptyline (ELAVIL) tablet 10 mg  10 mg Oral QHS  clopidogrel (PLAVIX) tablet 75 mg  75 mg Oral DAILY  isosorbide mononitrate ER (IMDUR) tablet 30 mg  30 mg Oral DAILY  lisinopril (PRINIVIL, ZESTRIL) tablet 5 mg  5 mg Oral DAILY  nitroglycerin (NITROSTAT) tablet 0.4 mg  0.4 mg SubLINGual Q5MIN PRN  pramipexole (MIRAPEX) tablet 0.5 mg  0.5 mg Oral QHS  traMADol (ULTRAM) tablet 50 mg  50 mg Oral Q6H PRN  
 magic mouthwash (JOSÉ) oral suspension 5 mL  5 mL Oral Q6H Objective:  
 
Vitals:  
 09/01/18 8564 09/01/18 7933 09/01/18 9408 09/01/18 6083 BP: 133/66   165/77 Pulse: 83   92 Resp: 16   16 Temp: 98.7 °F (37.1 °C)   97.9 °F (36.6 °C) SpO2: 96%   94% Weight:  170 lb 6.4 oz (77.3 kg) 170 lb 6.4 oz (77.3 kg) Height:      
 
Intake and Output:  
08/30 1901 - 09/01 0700 In: 2544 [P.O.:240; I.V.:2304] Out: 1475 [Urine:175] Physical Exam:         
Constitutional: the patient is well develop and in no distress HEENT: Sclera clear, pupils equal, oral mucosa moist 
Lungs: CTA B, no w/r/r 
Cardiovascular: RRR without M,G,R 
Abd/GI: soft and non-tender; with positive bowel sounds. Ext: warm without cyanosis. There is no lower leg edema. Musculoskeletal: moves all four extremities with equal strength Skin: no jaundice or rashes, no wounds Neuro: no gross neuro deficits Lines/Drains: IV 
Nutrition: LAB Recent Labs  
   09/01/18 
 0636  08/31/18 
 2192  08/30/18 
 5199 WBC  7.7  7.3  7.8 HGB  8.7*  8.8*  8.9* HCT  28.8*  29.6*  29.1*  
PLT  549*  507*  452* Recent Labs  
   09/01/18 
 0848  09/01/18 
 0636  08/31/18 
 5639  08/30/18 
 0556  08/29/18 
 1118 NA   --   142  143  141  136 K   --   3.5  3.7  3.5  4.0  
CL   --   106  106  105  97* CO2   --   29  29  27  27 GLU   --   94  93  104*  129* BUN   --   7*  6*  9  14 CREA   --   0.52*  0.52*  0.58*  0.86  
TROIQ  0.02   --    --    --   <0.02* Sputum 8/29 GRAM STAIN PENDING    Preliminary Culture result:  (A)    Preliminary HEAVY GRAM POSITIVE COCCI IDENTIFICATION AND SUSCEPTIBILITY TO FOLLOW Echocardiogram 
SUMMARY: 
 
-  Left ventricle: Systolic function was normal. Ejection fraction was 
estimated in the range of 55 % to 60 %. There were no regional wall motion 
abnormalities. -  Left atrium: The atrium was mildly dilated. -  Aortic valve: There was mild to moderate regurgitation. Gina Pique, systemic arteries: There was mild dilatation of the ascending  
aorta. -  Pericardium: A trace to small pericardial effusion was identified Posterior to the heart of no hemodynamic significance. There was a left pleural  
Effusion. CXR 
9/1/18 Assessment:  
 
Hospital Problems  Date Reviewed: 8/30/2018 Codes Class Noted POA Community acquired bacterial pneumonia ICD-10-CM: J15.9 ICD-9-CM: 482.9  8/29/2018 Yes Peptic ulcer disease (Chronic) ICD-10-CM: K27.9 ICD-9-CM: 533.90  8/29/2018 Yes Pericarditis with effusion ICD-10-CM: I31.9 ICD-9-CM: 423.9  8/29/2018 Yes Pleural effusion, right ICD-10-CM: J90 ICD-9-CM: 511.9  8/29/2018 Yes Pleural effusion, left ICD-10-CM: J90 ICD-9-CM: 511.9  8/29/2018 Yes Thrush ICD-10-CM: B37.0 ICD-9-CM: 112.0  8/29/2018 Yes DAVID (obstructive sleep apnea) (Chronic) ICD-10-CM: O20.76 
ICD-9-CM: 327.23  11/14/2016 Yes  
   
 BPH (benign prostatic hyperplasia) (Chronic) ICD-10-CM: N40.0 ICD-9-CM: 600.00  9/9/2015 Yes Hypertension (Chronic) ICD-10-CM: I10 
ICD-9-CM: 401.9  Unknown Yes Overview Signed 8/21/2015  8:46 AM by Brett mckinnon w/med Patient with recurrent exudative effusion with heavy MSSA in sputum. Also in setting of pericarditis. The effusion could be parapneumonic in nature or related to his pericardial process. Both are being treated and he is clinically improved though having some difficulty tolerating Plan  
-change abx from cef/azithro to amoxicillin 875mg bid x 7 days for MSSA PNA.  
-cont to monitor symptoms. When ready for discharge (ok from pulmonary standpoint) will need outpatient pulmonary f/u in 1-2 weeks with repeat CXR. -f/u final pleural fluid cultures Debi Munoz MD 
 
More than 50% of time documented was spent in face-to-face contact with the patient and in the care of the patient on the floor/unit where the patient is located.

## 2018-09-01 NOTE — PROGRESS NOTES
Md Cisneros at nursing station reading this pt EKG . Md orders \" I will compare the new EKG with the old ones and I will place the orders\".

## 2018-09-01 NOTE — PROGRESS NOTES
Pt is in room alert and oriented. rr are even and unlabored at current time. He has no c/o pain at current time. Wife at bedside. Safety measures in place.

## 2018-09-01 NOTE — PROGRESS NOTES
Problem: Falls - Risk of 
Goal: *Absence of Falls Document Bryant Beady Fall Risk and appropriate interventions in the flowsheet. Outcome: Progressing Towards Goal 
Fall Risk Interventions: 
Mobility Interventions: Bed/chair exit alarm Medication Interventions: Bed/chair exit alarm Elimination Interventions: Call light in reach

## 2018-09-01 NOTE — PROGRESS NOTES
9/1/2018 8:58 AM 
 
Admit Date: 8/29/2018 Subjective:  
 
Akira Interiano reports rt sided pleutic pain Was diagnosed with pericarditis  at [facility] seen by [facility] cards Has diarrhea with colchicine. Has pleural involvement Objective:  
  
Visit Vitals  /77  Pulse 92  Temp 97.9 °F (36.6 °C)  Resp 16  
 Ht 5' 8\" (1.727 m)  Wt 77.3 kg (170 lb 6.4 oz)  SpO2 94%  BMI 25.91 kg/m2 Physical Exam: 
Heart: regular rate and rhythm Lungs: clear to auscultation bilaterally Data Review:  
Labs:   
Recent Results (from the past 24 hour(s)) CELL COUNT, BODY FLUID Collection Time: 08/31/18 10:15 AM  
Result Value Ref Range BODY FLUID TYPE PLEURAL FLUID FLUID COLOR CLOUDY FLUID APPEARANCE CARLIE    
 FLUID RBC CT. [address] /cu mm FLUID WBC COUNT 3072 /cu mm  
 FLD NEUTROPHILS 60 % FLD LYMPHS 16 % FLD MONO/MACROPHAGES 24 % FLUID COMMENT    
  NUMEROUS LARGE UNIDENTIFIED MONONUCLEAR CELLS PRESENT  
GLUCOSE, FLUID Collection Time: 08/31/18 10:15 AM  
Result Value Ref Range Fluid Type: PLEURAL FLUID Glucose, body fld. 118 MG/DL  
LDH, BODY FLUID Collection Time: 08/31/18 10:15 AM  
Result Value Ref Range Fluid Type: PLEURAL FLUID    
 LD, body fld. 120 U/L  
PROTEIN TOTAL, FLUID Collection Time: 08/31/18 10:15 AM  
Result Value Ref Range Fluid Type: PLEURAL FLUID Protein total, body fld. 2.8 g/dL CELL COUNT, BODY FLUID Collection Time: 08/31/18 10:30 AM  
Result Value Ref Range BODY FLUID TYPE RIGHT FLUID COLOR CLOUDY FLUID APPEARANCE CARLIE    
 FLUID RBC CT. [address] /cu mm FLUID WBC COUNT 1586 /cu mm  
 FLD NEUTROPHILS 35 % FLD LYMPHS 58 % FLD MONO/MACROPHAGES 7 % FLUID COMMENT    
  NUMEROUS LARGE UNIDENTIFIED MONONUCLEAR CELLS PRESENT  
GLUCOSE, FLUID Collection Time: 08/31/18 10:30 AM  
Result Value Ref Range Fluid Type: RIGHT Glucose, body fld.  120 MG/DL  
LDH, BODY FLUID  
 Collection Time: 08/31/18 10:30 AM  
Result Value Ref Range Fluid Type: RIGHT    
 LD, body fld. 125 U/L  
PROTEIN TOTAL, FLUID Collection Time: 08/31/18 10:30 AM  
Result Value Ref Range Fluid Type: RIGHT Protein total, body fld. 2.9 g/dL CULTURE, BODY FLUID W GRAM STAIN Collection Time: 08/31/18 10:30 AM  
Result Value Ref Range Special Requests: RIGHT    
 GRAM STAIN PENDING Culture result: NO GROWTH 1 DAY EKG, 12 LEAD, SUBSEQUENT Collection Time: 09/01/18 12:47 AM  
Result Value Ref Range Ventricular Rate 85 BPM  
 Atrial Rate 85 BPM  
 P-R Interval 200 ms QRS Duration 104 ms Q-T Interval 356 ms  
 QTC Calculation (Bezet) 423 ms Calculated P Axis 15 degrees Calculated R Axis -11 degrees Calculated T Axis 124 degrees Diagnosis Normal sinus rhythm Cannot rule out Anterior infarct (cited on or before 19-MAY-2018) Abnormal ECG When compared with ECG of 01-SEP-2018 00:46, 
QT has shortened Confirmed by ST ROSALVA FRAGA MD (), ASHLEY DE LA CRUZ ([id number]) on 9/1/2018 4:03:31 AM 
  
METABOLIC PANEL, BASIC Collection Time: 09/01/18  6:36 AM  
Result Value Ref Range Sodium 142 136 - 145 mmol/L Potassium 3.5 3.5 - 5.1 mmol/L Chloride 106 98 - 107 mmol/L  
 CO2 29 21 - 32 mmol/L Anion gap 7 7 - 16 mmol/L Glucose 94 65 - 100 mg/dL BUN 7 (L) 8 - 23 MG/DL Creatinine 0.52 (L) 0.8 - 1.5 MG/DL  
 GFR est AA >60 >60 ml/min/1.73m2 GFR est non-AA >60 >60 ml/min/1.73m2 Calcium 8.0 (L) 8.3 - 10.4 MG/DL  
CBC W/O DIFF Collection Time: 09/01/18  6:36 AM  
Result Value Ref Range WBC 7.7 4.3 - 11.1 K/uL  
 RBC 3.87 (L) 4.23 - 5.6 M/uL HGB 8.7 (L) 13.6 - 17.2 g/dL HCT 28.8 (L) 41.1 - 50.3 % MCV 74.4 (L) 79.6 - 97.8 FL  
 MCH 22.5 (L) 26.1 - 32.9 PG  
 MCHC 30.2 (L) 31.4 - 35.0 g/dL  
 RDW 18.0 % PLATELET 231 (H) 480 - 450 K/uL MPV 10.0 9.4 - 12.3 FL ABSOLUTE NRBC 0.00 0.0 - 0.2 K/uL Telemetry: normal sinus rhythm no ST changes ECHO:small eff Radiology:pkleural eff ? infiltarte Assessment:  
 
Patient Active Problem List  
 Diagnosis Date Noted  Community acquired bacterial pneumonia ? Viral  08/29/2018  Peptic ulcer disease 08/29/2018  Pericarditis with effusion ? Viral  08/29/2018  Pleural effusion, right 08/29/2018  Pleural effusion, left 08/29/2018 Ritika Bedolla 08/29/2018  Upper GI bleed 05/19/2018  Acute blood loss anemia 05/19/2018  Coronary artery disease involving native coronary artery of native heart without angina pectoris 11/14/2016  DAVID (obstructive sleep apnea) 11/14/2016  Arthritis of shoulder region, right, degenerative 10/28/2015  BPH (benign prostatic hyperplasia) 09/09/2015  Allergic rhinitis  Hypercholesterolemia  Hypertension  Restless leg syndrome  IBS (irritable bowel syndrome) Plan:  
Back on cochine but may not be able to take secondary to GI upset Has nausea meds PRN

## 2018-09-01 NOTE — PROGRESS NOTES
Hospitalist Progress Note 2018 Admit Date: 2018 11:37 AM  
NAME: Sheeba Vergara :  1940 MRN:  [id number] Attending: Jimmy Rivas MD 
PCP:  Eva Saldana MD 
 
SUBJECTIVE:  
 
Sheeba Vergara is a 78 M with PMH of CAD s/p stent, GI Bleed, Peptic ulcers, HLD, HTN was sent to the ER on  by PCP for abnormal Labs and suspected infection and SOB. Pt was noted to have bilateral pleural effusion L>>R, had removal of 1100 ml of fluid from left. Pt started on rocephin/azithro for LLL CAP, received vanc and zosyn in ER. Recently discharged from [facility] after being treated for acute viral pericarditis, treated initially with colchicine, and later with NSAID. Pulmonary is on board. : 
Pt reports having a rough night. Had mild chest pain that has resolved. Pt thinks is likely coming from colchicine. He also reports several episodes of loose stools. Respiratory status stable, on room air since yesterday. Review of Systems negative with exception of pertinent positives noted above PHYSICAL EXAM  
 
 
Visit Vitals  /66 (BP 1 Location: Left arm, BP Patient Position: At rest)  Pulse 83  Temp 98.7 °F (37.1 °C)  Resp 16  
 Ht 5' 8\" (1.727 m)  Wt 77.3 kg (170 lb 6.4 oz)  SpO2 96%  BMI 25.91 kg/m2 Temp (24hrs), Av.1 °F (36.7 °C), Min:97.7 °F (36.5 °C), Max:98.7 °F (37.1 °C) Oxygen Therapy O2 Sat (%): 96 % (18 0307) Pulse via Oximetry: 91 beats per minute (18 1000) O2 Device: Room air (18 1045) O2 Flow Rate (L/min): 1 l/min (weaned from 2) (18 0741) ETCO2 (mmHg): 95 mmHg (18 2311) Intake/Output Summary (Last 24 hours) at 18 5248 Last data filed at 08/31/18 1000 Gross per 24 hour Intake              240 ml Output             1475 ml Net            -1235 ml General:                  NAD Head:                                   NCAT. No obvious deformity Nose:                                   Nares normal. No drainage Lungs:                       bibasilar crackles, air entry equal on both sides Heart:                                  RRR. No m/r/g. Abdomen:                  S/nt/nd. Bowel sounds normal.  
Extremities:               No cyanosis. Skin:                                    No rashes or lesions. Not Jaundiced Neurologic:               GCS 15, no motor or sensory deficits, CN 2-12 intact Recent Results (from the past 24 hour(s)) CELL COUNT, BODY FLUID Collection Time: 08/31/18 10:15 AM  
Result Value Ref Range BODY FLUID TYPE PLEURAL FLUID FLUID COLOR CLOUDY FLUID APPEARANCE CARLIE    
 FLUID RBC CT. [address] /cu mm FLUID WBC COUNT 3072 /cu mm  
 FLD NEUTROPHILS 60 % FLD LYMPHS 16 % FLD MONO/MACROPHAGES 24 % FLUID COMMENT    
  NUMEROUS LARGE UNIDENTIFIED MONONUCLEAR CELLS PRESENT  
GLUCOSE, FLUID Collection Time: 08/31/18 10:15 AM  
Result Value Ref Range Fluid Type: PLEURAL FLUID Glucose, body fld. 118 MG/DL  
LDH, BODY FLUID Collection Time: 08/31/18 10:15 AM  
Result Value Ref Range Fluid Type: PLEURAL FLUID    
 LD, body fld. 120 U/L  
PROTEIN TOTAL, FLUID Collection Time: 08/31/18 10:15 AM  
Result Value Ref Range Fluid Type: PLEURAL FLUID Protein total, body fld. 2.8 g/dL CELL COUNT, BODY FLUID Collection Time: 08/31/18 10:30 AM  
Result Value Ref Range BODY FLUID TYPE RIGHT FLUID COLOR CLOUDY FLUID APPEARANCE CARLIE    
 FLUID RBC CT. [address] /cu mm FLUID WBC COUNT 1586 /cu mm  
 FLD NEUTROPHILS 35 % FLD LYMPHS 58 % FLD MONO/MACROPHAGES 7 % FLUID COMMENT    
  NUMEROUS LARGE UNIDENTIFIED MONONUCLEAR CELLS PRESENT  
GLUCOSE, FLUID Collection Time: 08/31/18 10:30 AM  
Result Value Ref Range Fluid Type: RIGHT Glucose, body fld. 120 MG/DL  
LDH, BODY FLUID Collection Time: 08/31/18 10:30 AM  
Result Value Ref Range Fluid Type: RIGHT LD, body fld. 125 U/L  
PROTEIN TOTAL, FLUID Collection Time: 08/31/18 10:30 AM  
Result Value Ref Range Fluid Type: RIGHT Protein total, body fld. 2.9 g/dL EKG, 12 LEAD, SUBSEQUENT Collection Time: 09/01/18 12:47 AM  
Result Value Ref Range Ventricular Rate 85 BPM  
 Atrial Rate 85 BPM  
 P-R Interval 200 ms QRS Duration 104 ms Q-T Interval 356 ms  
 QTC Calculation (Bezet) 423 ms Calculated P Axis 15 degrees Calculated R Axis -11 degrees Calculated T Axis 124 degrees Diagnosis Normal sinus rhythm Cannot rule out Anterior infarct (cited on or before 19-MAY-2018) Abnormal ECG When compared with ECG of 01-SEP-2018 00:46, 
QT has shortened Confirmed by ST ROSALVA FRAGA MD (), ASHLEY DE LA CRUZ ([id number]) on 9/1/2018 7:18:18 AM 
  
CBC W/O DIFF Collection Time: 09/01/18  6:36 AM  
Result Value Ref Range WBC 7.7 4.3 - 11.1 K/uL  
 RBC 3.87 (L) 4.23 - 5.6 M/uL HGB 8.7 (L) 13.6 - 17.2 g/dL HCT 28.8 (L) 41.1 - 50.3 % MCV 74.4 (L) 79.6 - 97.8 FL  
 MCH 22.5 (L) 26.1 - 32.9 PG  
 MCHC 30.2 (L) 31.4 - 35.0 g/dL  
 RDW 18.0 % PLATELET 639 (H) 147 - 450 K/uL MPV 10.0 9.4 - 12.3 FL ABSOLUTE NRBC 0.00 0.0 - 0.2 K/uL [facility] Portable chest xray   
  
COMPARISON: August 29, 2018. 
  
CLINICAL HISTORY: Check effusions. 
  
FINDINGS: 
  
Lungs are underinflated. Persistent elevation of the right hemidiaphragm. There 
are bibasilar densities, likely combination of pleural effusion and atelectasis. No pneumothorax or pulmonary edema. Cardiac mediastinal contour and the 
surrounding bones are stable. 
  
IMPRESSION IMPRESSION: 
  
Bilateral pleural effusions and associated atelectasis. ASSESSMENT Hospital Problems as of 9/1/2018  Date Reviewed: 8/30/2018 Codes Class Noted - Resolved POA Community acquired bacterial pneumonia ICD-10-CM: J15.9 ICD-9-CM: 482.9  8/29/2018 - Present Yes Peptic ulcer disease (Chronic) ICD-10-CM: K27.9 ICD-9-CM: 533.90  8/29/2018 - Present Yes Pericarditis with effusion ICD-10-CM: I31.9 ICD-9-CM: 423.9  8/29/2018 - Present Yes Pleural effusion, right ICD-10-CM: J90 ICD-9-CM: 511.9  8/29/2018 - Present Yes Pleural effusion, left ICD-10-CM: J90 ICD-9-CM: 511.9  8/29/2018 - Present Yes Thrush ICD-10-CM: B37.0 ICD-9-CM: 112.0  8/29/2018 - Present Yes  
   
 DAVID (obstructive sleep apnea) (Chronic) ICD-10-CM: E43.88 
ICD-9-CM: 327.23  11/14/2016 - Present Yes  
   
 BPH (benign prostatic hyperplasia) (Chronic) ICD-10-CM: N40.0 ICD-9-CM: 600.00  9/9/2015 - Present Yes Hypercholesterolemia (Chronic) ICD-10-CM: E78.00 ICD-9-CM: 272.0  Unknown - Present Hypertension (Chronic) ICD-10-CM: I10 
ICD-9-CM: 401.9  Unknown - Present Yes Overview Signed 8/21/2015  8:46 AM by Delfin Hatch  
  controlled w/med Restless leg syndrome (Chronic) ICD-10-CM: G25.81 ICD-9-CM: 333.94  Unknown - Present Overview Signed 8/21/2015  8:52 AM by Delfin Hatch  
  manged with medications IBS (irritable bowel syndrome) (Chronic) ICD-10-CM: K58.9 ICD-9-CM: 564.1  Unknown - Present Overview Signed 8/21/2015  8:52 AM by Delfin roblero with medications RESOLVED: Hemoptysis ICD-10-CM: R04.2 ICD-9-CM: 786.30  8/29/2018 - 8/31/2018 Yes * (Principal)RESOLVED: Acute respiratory failure with hypoxia ([facility] [address] ICD-10-CM: J96.01 
ICD-9-CM: 518.81  8/29/2018 - 8/31/2018 Yes RESOLVED: Hypoxemia ICD-10-CM: R09.02 
ICD-9-CM: 799.02  8/29/2018 - 8/31/2018 Yes Plan: 
- sputum culture positive for MSSA, antibiotics switched to Amoxicillin 875 mg bid x 7 days - s/p thoracentesis 8/31, repeat CXR in AM 
- discontinue contact isolation - Cardiology recommends 3-6 months of Colchicine and NSAID Rx. Repeat Echo showed trace to small pericardial effusion, EF 55-60% - currently on room air, no distress noted - pt continues to have loose stools, likely from combination of colchicine and Abx. Pt states that he would like to continue colchicine despite the side effects. No new recommendations from Cardiology. DVT Prophylaxis: lovenox Dispo: to home when ready.  
 
Estephania Arvizu MD

## 2018-09-01 NOTE — PROGRESS NOTES
Patient's VS within normal range, pt alert and oriented, stable, pt states \" chest pain is not as strong as it was 1 hr ago\" charge nurse aware about pt status.

## 2018-09-01 NOTE — PROGRESS NOTES
Bedside report received from SHERRILL Ly. Assessment completed. Pt is lying in bed at this time. Pt A&O x 4. Respirations even and unlabored. No s/sx of acute pain or distress. Bed in low, locked position. Call light within reach. Pt instructed to call for assistance. Will monitor.

## 2018-09-01 NOTE — PROGRESS NOTES
Patient admitted for CAP, pleural effusion and pericarditis. Patient stated  early 08/31 he had thoracocentesis with some relieve of the chest pain but now chest pain is almost as before Patient stated pain is located on R side chest. 
 
Physical 
BP and HR stable Lung: few crackles at lung bases Heart S1 S2 RRR, Abdomen: soft, no tenderness Chest pain could be from pericarditis/ PNA /pleural effusions Cardiology following on colchicine for pericarditis Plan Check CXR as patient had thoracocentesis 08/31 Check EKG Addendum: 
EKG NSR @85, no acute ST elevation CXR with no changes from prior Monitor patient overnight Cardiology follow up on AM

## 2018-09-01 NOTE — PROGRESS NOTES
Md Georgie Rodriguez at patient room and after auscultation said \"I will order chest X ray on this patient, I will place the orders\".

## 2018-09-01 NOTE — PROGRESS NOTES
Pt complaining of chest pain, vitals signs Temperature 98.1, blood [id number], pulse 91, respiration rate 12, Md Cisneros paged and informed about pt status, Md orders: \" start an EKG on this pt now\".

## 2018-09-01 NOTE — PROGRESS NOTES
Pt is in room alert and oriented. rr are even and unlabored lung sounds are diminished no distress noted. He is on room air. Tolerates well. abd is soft bowel sounds are active. He has a c/o nausea but stated he did not want anything for it until lunch. He has a small rash to his back he stated it itched some. Area cleaned cream applied. He is stable at current time. Wife at bedside. No increased bleeding or bruising noted. Safety measures in place will continue to monitor.

## 2018-09-02 ENCOUNTER — APPOINTMENT (OUTPATIENT)
Dept: GENERAL RADIOLOGY | Age: 78
DRG: 177 | End: 2018-09-02
Attending: HOSPITALIST
Payer: MEDICARE

## 2018-09-02 VITALS
WEIGHT: 170.4 LBS | SYSTOLIC BLOOD PRESSURE: 141 MMHG | HEART RATE: 86 BPM | OXYGEN SATURATION: 93 % | RESPIRATION RATE: 18 BRPM | TEMPERATURE: 98.8 F | BODY MASS INDEX: 25.82 KG/M2 | HEIGHT: 68 IN | DIASTOLIC BLOOD PRESSURE: 87 MMHG

## 2018-09-02 LAB
BACTERIA SPEC CULT: NORMAL
ERYTHROCYTE [DISTWIDTH] IN BLOOD BY AUTOMATED COUNT: 18 %
GRAM STN SPEC: NORMAL
GRAM STN SPEC: NORMAL
HCT VFR BLD AUTO: 29.8 % (ref 41.1–50.3)
HGB BLD-MCNC: 9 G/DL (ref 13.6–17.2)
MCH RBC QN AUTO: 22.4 PG (ref 26.1–32.9)
MCHC RBC AUTO-ENTMCNC: 30.2 G/DL (ref 31.4–35)
MCV RBC AUTO: 74.1 FL (ref 79.6–97.8)
NRBC # BLD: 0 K/UL (ref 0–0.2)
PLATELET # BLD AUTO: 613 K/UL (ref 150–450)
PMV BLD AUTO: 10 FL (ref 9.4–12.3)
RBC # BLD AUTO: 4.02 M/UL (ref 4.23–5.6)
SERVICE CMNT-IMP: NORMAL
WBC # BLD AUTO: 8.3 K/UL (ref 4.3–11.1)

## 2018-09-02 PROCEDURE — 36415 COLL VENOUS BLD VENIPUNCTURE: CPT

## 2018-09-02 PROCEDURE — 85027 COMPLETE CBC AUTOMATED: CPT

## 2018-09-02 PROCEDURE — 74011250637 HC RX REV CODE- 250/637: Performed by: INTERNAL MEDICINE

## 2018-09-02 PROCEDURE — 99232 SBSQ HOSP IP/OBS MODERATE 35: CPT | Performed by: INTERNAL MEDICINE

## 2018-09-02 PROCEDURE — 74011250637 HC RX REV CODE- 250/637: Performed by: NURSE PRACTITIONER

## 2018-09-02 PROCEDURE — 74011250636 HC RX REV CODE- 250/636: Performed by: INTERNAL MEDICINE

## 2018-09-02 PROCEDURE — 71046 X-RAY EXAM CHEST 2 VIEWS: CPT

## 2018-09-02 PROCEDURE — 74011250637 HC RX REV CODE- 250/637: Performed by: HOSPITALIST

## 2018-09-02 RX ORDER — TRAMADOL HYDROCHLORIDE 50 MG/1
50 TABLET ORAL
Qty: 40 TAB | Refills: 1 | Status: SHIPPED | OUTPATIENT
Start: 2018-09-02 | End: 2018-10-17

## 2018-09-02 RX ORDER — INDOMETHACIN 50 MG/1
50 CAPSULE ORAL
Qty: 90 CAP | Refills: 2 | Status: SHIPPED | OUTPATIENT
Start: 2018-09-02 | End: 2018-12-01

## 2018-09-02 RX ORDER — AMOXICILLIN 500 MG/1
500 CAPSULE ORAL EVERY 8 HOURS
Qty: 21 CAP | Refills: 0 | Status: SHIPPED | OUTPATIENT
Start: 2018-09-02 | End: 2018-09-02

## 2018-09-02 RX ORDER — COLCHICINE 0.6 MG/1
0.6 TABLET ORAL 2 TIMES DAILY
Qty: 60 TAB | Refills: 2 | Status: SHIPPED | OUTPATIENT
Start: 2018-09-02 | End: 2018-10-02

## 2018-09-02 RX ORDER — AMOXICILLIN 875 MG/1
875 TABLET, FILM COATED ORAL 2 TIMES DAILY
Qty: 14 TAB | Refills: 0 | Status: SHIPPED | OUTPATIENT
Start: 2018-09-02 | End: 2018-09-10

## 2018-09-02 RX ORDER — ONDANSETRON 8 MG/1
8 TABLET, ORALLY DISINTEGRATING ORAL
Qty: 30 TAB | Refills: 2 | Status: SHIPPED | OUTPATIENT
Start: 2018-09-02 | End: 2018-10-17

## 2018-09-02 RX ADMIN — Medication 5 ML: at 05:55

## 2018-09-02 RX ADMIN — PANTOPRAZOLE SODIUM 40 MG: 40 TABLET, DELAYED RELEASE ORAL at 05:55

## 2018-09-02 RX ADMIN — AMOXICILLIN 500 MG: 500 CAPSULE ORAL at 05:55

## 2018-09-02 RX ADMIN — LISINOPRIL 5 MG: 5 TABLET ORAL at 07:29

## 2018-09-02 RX ADMIN — CLOPIDOGREL BISULFATE 75 MG: 75 TABLET ORAL at 07:30

## 2018-09-02 RX ADMIN — ISOSORBIDE MONONITRATE 30 MG: 30 TABLET, EXTENDED RELEASE ORAL at 07:30

## 2018-09-02 RX ADMIN — COLCHICINE 0.6 MG: 0.6 TABLET, FILM COATED ORAL at 07:30

## 2018-09-02 RX ADMIN — ONDANSETRON 4 MG: 2 INJECTION INTRAMUSCULAR; INTRAVENOUS at 07:29

## 2018-09-02 RX ADMIN — METOPROLOL SUCCINATE 100 MG: 50 TABLET, EXTENDED RELEASE ORAL at 07:29

## 2018-09-02 RX ADMIN — INDOMETHACIN 50 MG: 50 CAPSULE ORAL at 07:29

## 2018-09-02 NOTE — DISCHARGE INSTRUCTIONS
Follow up with pulmonary in 2 weeks as scheduled. Follow up with Cardiology in 1-2 weeks as scheduled. Follow up with PCP in 1 week. Learning About Pleural Effusion  What is pleural effusion? Pleural effusion (say \"PLER-jacoby [id number]-[id number]\") is the buildup of fluid in the space between tissues lining the lungs and chest wall. Because of the fluid buildup, the lungs may not be able to expand completely, which can make it hard to breathe. The lung, or part of it, may collapse. Pleural effusion has many causes, such as pneumonia, cancer, inflammation of the tissues around the lungs, and heart failure. Pleural effusion is usually diagnosed with an X-ray and a physical exam. The doctor listens to the air flow in your lungs. What are the symptoms? Symptoms of pleural effusion may include:  · Trouble breathing. · Shortness of breath. · Chest pain. · Fever. · A cough. Minor pleural effusion may not cause any symptoms. How is pleural effusion treated? Doctors may need to treat the condition that is causing pleural effusion. For example, you may get medicines to treat pneumonia or congestive heart failure. Minor pleural effusion often goes away on its own without treatment. Removing fluid  Pleural effusion can be treated by removing fluid from the space between the tissues around the lungs. This is done with a needle that's put into the chest (thoracentesis). A small amount of the fluid may be sent to a lab to find out what is causing the buildup of fluid. Removing the fluid may help to relieve symptoms, such as shortness of breath and chest pain. It can help the lungs to expand more fully. In some cases, if pleural effusion doesn't get better, a catheter may be placed in the chest. This is a flexible tube that allows fluid to drain from the lungs. The catheter stays in the chest until the doctor removes it.  Some people may get a treatment that removes the fluid and then puts a medicine into the chest cavity. This helps to prevent too much fluid from building up again. Follow-up care is a key part of your treatment and safety. Be sure to make and go to all appointments, and call your doctor if you are having problems. It's also a good idea to know your test results and keep a list of the medicines you take. Where can you learn more? Go to http://lisa-aranza.info/. Enter A920 in the search box to learn more about \"Learning About Pleural Effusion. \"  Current as of: December 6, 2017  Content Version: 11.7  © 8384-4048 [employer]. Care instructions adapted under license by [employer] (which disclaims liability or warranty for this information). If you have questions about a medical condition or this instruction, always ask your healthcare professional. [address] any warranty or liability for your use of this information. Shortness of Breath: Care Instructions  Your Care Instructions  Shortness of breath has many causes. Sometimes conditions such as anxiety can lead to shortness of breath. Some people get mild shortness of breath when they exercise. Trouble breathing also can be a symptom of a serious problem, such as asthma, lung disease, emphysema, heart problems, and pneumonia. If your shortness of breath continues, you may need tests and treatment. Watch for any changes in your breathing and other symptoms. Follow-up care is a key part of your treatment and safety. Be sure to make and go to all appointments, and call your doctor if you are having problems. It's also a good idea to know your test results and keep a list of the medicines you take. How can you care for yourself at home? · Do not smoke or allow others to smoke around you. If you need help quitting, talk to your doctor about stop-smoking programs and medicines. These can increase your chances of quitting for good. · Get plenty of rest and sleep.   · Take your medicines exactly as prescribed. Call your doctor if you think you are having a problem with your medicine. · Find healthy ways to deal with stress. ¨ Exercise daily. ¨ Get plenty of sleep. ¨ Eat regularly and well. When should you call for help? Call 911 anytime you think you may need emergency care. For example, call if:    · You have severe shortness of breath.     · You have symptoms of a heart attack. These may include:  ¨ Chest pain or pressure, or a strange feeling in the chest.  ¨ Sweating. ¨ Shortness of breath. ¨ Nausea or vomiting. ¨ Pain, pressure, or a strange feeling in the back, neck, jaw, or upper belly or in one or both shoulders or arms. ¨ Lightheadedness or sudden weakness. ¨ A fast or irregular heartbeat. After you call 911, the  may tell you to chew 1 adult-strength or 2 to 4 low-dose aspirin. Wait for an ambulance. Do not try to drive yourself.    Call your doctor now or seek immediate medical care if:    · Your shortness of breath gets worse or you start to wheeze. Wheezing is a high-pitched sound when you breathe.     · You wake up at night out of breath or have to prop your head up on several pillows to breathe.     · You are short of breath after only light activity or while at rest.    Watch closely for changes in your health, and be sure to contact your doctor if:    · You do not get better over the next 1 to 2 days. Where can you learn more? Go to http://lisa-aranza.info/. Enter S780 in the search box to learn more about \"Shortness of Breath: Care Instructions. \"  Current as of: December 6, 2017  Content Version: 11.7  © 8088-2130 [employer]. Care instructions adapted under license by [employer] (which disclaims liability or warranty for this information).  If you have questions about a medical condition or this instruction, always ask your healthcare professional. [employer] disclaims any warranty or liability for your use of this inform      DISCHARGE SUMMARY from Nurse    PATIENT INSTRUCTIONS:    After general anesthesia or intravenous sedation, for 24 hours or while taking prescription Narcotics:  · Limit your activities  · Do not drive and operate hazardous machinery  · Do not make important personal or business decisions  · Do  not drink alcoholic beverages  · If you have not urinated within 8 hours after discharge, please contact your surgeon on call. Report the following to your surgeon:  · Excessive pain, swelling, redness or odor of or around the surgical area  · Temperature over 100.5  · Nausea and vomiting lasting longer than 4 hours or if unable to take medications  · Any signs of decreased circulation or nerve impairment to extremity: change in color, persistent  numbness, tingling, coldness or increase pain  · Any questions    What to do at Home:  Recommended activity: Activity as tolerated,     If you experience any of the following symptoms fever greater then 100.5, pain unrelieved by medication, increase in shortness of breath, please follow up with primary care doctor. *  Please give a list of your current medications to your Primary Care Provider. *  Please update this list whenever your medications are discontinued, doses are      changed, or new medications (including over-the-counter products) are added. *  Please carry medication information at all times in case of emergency situations. These are general instructions for a healthy lifestyle:    No smoking/ No tobacco products/ Avoid exposure to second hand smoke  Surgeon General's Warning:  Quitting smoking now greatly reduces serious risk to your health.     Obesity, smoking, and sedentary lifestyle greatly increases your risk for illness    A healthy diet, regular physical exercise & weight monitoring are important for maintaining a healthy lifestyle    You may be retaining fluid if you have a history of heart failure or if you experience any of the following symptoms:  Weight gain of 3 pounds or more overnight or 5 pounds in a week, increased swelling in our hands or feet or shortness of breath while lying flat in bed. Please call your doctor as soon as you notice any of these symptoms; do not wait until your next office visit. Recognize signs and symptoms of STROKE:    F-face looks uneven    A-arms unable to move or move unevenly    S-speech slurred or non-existent    T-time-call 911 as soon as signs and symptoms begin-DO NOT go       Back to bed or wait to see if you get better-TIME IS BRAIN. Warning Signs of HEART ATTACK     Call 911 if you have these symptoms:   Chest discomfort. Most heart attacks involve discomfort in the center of the chest that lasts more than a few minutes, or that goes away and comes back. It can feel like uncomfortable pressure, squeezing, fullness, or pain.  Discomfort in other areas of the upper body. Symptoms can include pain or discomfort in one or both arms, the back, neck, jaw, or stomach.  Shortness of breath with or without chest discomfort.  Other signs may include breaking out in a cold sweat, nausea, or lightheadedness. Don't wait more than five minutes to call 911 - MINUTES MATTER! Fast action can save your life. Calling 911 is almost always the fastest way to get lifesaving treatment. Emergency Medical Services staff can begin treatment when they arrive -- up to an hour sooner than if someone gets to the hospital by car. The discharge information has been reviewed with the patient. The patient verbalized understanding. Discharge medications reviewed with the patient and appropriate educational materials and side effects teaching were provided.   ___________________________________________________________________________________________________________________________________

## 2018-09-02 NOTE — PROGRESS NOTES
Leslie Job Admission Date: 8/29/2018 Daily Progress Note: 9/2/2018 Pt is a 79 yo  male with a history of CAD, HTN, HLD, PUD, and recent pericarditis after visiting her daughter in [place] whose 3 small children had viral illnesses. He was hospitalized at [facility] x 2 (8/4 and 8/14)and treated with colchicine and ibuprofen. Pt presented to the ER on 8/29 with acute respiratory failure and LLL pneumonia and bilateral pleural effusions. He was admitted and we were consulted to assist. Pt underwent L thoracentesis with 1100 mL removed. Only a small R pleural effusion noted which was too small to tap. Pleural fluid negative but sputum with heavy GPC. B thoracentesis 8/31. Subjective:  
 
Patient remains on room air. Still with some chest pain though maybe better. Some diarrhea and nausea with colchicine but tolerable. Review of Systems Constitutional: positive for fatigue Respiratory: negative for sputum or dyspnea on exertion Current Facility-Administered Medications Medication Dose Route Frequency  amoxicillin (AMOXIL) capsule 500 mg  500 mg Oral Q8H  
 pantoprazole (PROTONIX) tablet 40 mg  40 mg Oral ACB&D  
 metoprolol succinate (TOPROL-XL) XL tablet 100 mg  100 mg Oral DAILY  colchicine tablet 0.6 mg  0.6 mg Oral BID  sodium chloride (NS) flush 5-10 mL  5-10 mL IntraVENous Q8H  
 sodium chloride (NS) flush 5-10 mL  5-10 mL IntraVENous PRN  
 acetaminophen (TYLENOL) tablet 650 mg  650 mg Oral Q4H PRN  
 diphenhydrAMINE (BENADRYL) injection 12.5 mg  12.5 mg IntraVENous Q4H PRN  
 ondansetron (ZOFRAN) injection 4 mg  4 mg IntraVENous Q4H PRN  
 bisacodyl (DULCOLAX) tablet 5 mg  5 mg Oral DAILY PRN  
 enoxaparin (LOVENOX) injection 40 mg  40 mg SubCUTAneous Q24H  
 indomethacin (INDOCIN) capsule 50 mg  50 mg Oral TID WITH MEALS  
 amitriptyline (ELAVIL) tablet 10 mg  10 mg Oral QHS  clopidogrel (PLAVIX) tablet 75 mg  75 mg Oral DAILY  isosorbide mononitrate ER (IMDUR) tablet 30 mg  30 mg Oral DAILY  lisinopril (PRINIVIL, ZESTRIL) tablet 5 mg  5 mg Oral DAILY  nitroglycerin (NITROSTAT) tablet 0.4 mg  0.4 mg SubLINGual Q5MIN PRN  pramipexole (MIRAPEX) tablet 0.5 mg  0.5 mg Oral QHS  traMADol (ULTRAM) tablet 50 mg  50 mg Oral Q6H PRN  
 magic mouthwash (JOSÉ) oral suspension 5 mL  5 mL Oral Q6H Objective:  
 
Vitals:  
 09/01/18 2257 09/02/18 0157 09/02/18 0308 09/02/18 5727 BP: 148/73  147/75 141/87 Pulse: 78 81 88 86 Resp: 18 18 18 Temp: 98.2 °F (36.8 °C)  98.4 °F (36.9 °C) 98.8 °F (37.1 °C) SpO2: 93%  96% 93% Weight:      
Height:      
 
Intake and Output:  
08/31 1901 - 09/02 0700 In: 824 [P.O.:985; I.V.:10] Out: 600 [Urine:600] Physical Exam:         
Constitutional: the patient is well develop and in no distress HEENT: Sclera clear, pupils equal, oral mucosa moist 
Lungs: CTA B, no w/r/r 
Cardiovascular: RRR without M,G,R 
Abd/GI: soft and non-tender; with positive bowel sounds. Ext: warm without cyanosis. There is no lower leg edema. Musculoskeletal: moves all four extremities with equal strength Skin: no jaundice or rashes, no wounds Neuro: no gross neuro deficits Lines/Drains: IV 
Nutrition: LAB Recent Labs  
   09/01/18 
 0636  08/31/18 
 7990 WBC  7.7  7.3 HGB  8.7*  8.8* HCT  28.8*  29.6*  
PLT  549*  507* Recent Labs  
   09/01/18 2015  09/01/18 
 1309  09/01/18 
 0848  09/01/18 
 0636  08/31/18 
 3708 NA   --    --    --   142  143 K   --    --    --   3.5  3.7 CL   --    --    --   106  106 CO2   --    --    --   29  29 GLU   --    --    --   94  93 BUN   --    --    --   7*  6*  
CREA   --    --    --   0.52*  0.52* TROIQ  0.03  0.02  0.02   --    -- Sputum 8/29 GRAM STAIN PENDING    Preliminary Culture result:  (A)    Preliminary HEAVY GRAM POSITIVE COCCI IDENTIFICATION AND SUSCEPTIBILITY TO FOLLOW Echocardiogram 
SUMMARY: 
 -  Left ventricle: Systolic function was normal. Ejection fraction was 
estimated in the range of 55 % to 60 %. There were no regional wall motion 
abnormalities -  Left atrium: The atrium was mildly dilated. -  Aortic valve: There was mild to moderate regurgitation. Estle Rink, systemic arteries: There was mild dilatation of the ascending  
aorta. -  Pericardium: A trace to small pericardial effusion was identified Posterior to the heart of no hemodynamic significance. There was a left pleural  
Effusion. CXR 
9/2/18 IMPRESSION: Chest appearance similar to the prior study-as above. There does 
appear to be mildly better inflation of the lungs today. 9/1/18 Assessment:  
 
Hospital Problems  Date Reviewed: 8/30/2018 Codes Class Noted POA Community acquired bacterial pneumonia ICD-10-CM: J15.9 ICD-9-CM: 482.9  8/29/2018 Yes Peptic ulcer disease (Chronic) ICD-10-CM: K27.9 ICD-9-CM: 533.90  8/29/2018 Yes Pericarditis with effusion ICD-10-CM: I31.9 ICD-9-CM: 423.9  8/29/2018 Yes Pleural effusion, right ICD-10-CM: J90 ICD-9-CM: 511.9  8/29/2018 Yes Pleural effusion, left ICD-10-CM: J90 ICD-9-CM: 511.9  8/29/2018 Yes Thrush ICD-10-CM: B37.0 ICD-9-CM: 112.0  8/29/2018 Yes DAVID (obstructive sleep apnea) (Chronic) ICD-10-CM: O02.45 
ICD-9-CM: 327.23  11/14/2016 Yes  
   
 BPH (benign prostatic hyperplasia) (Chronic) ICD-10-CM: N40.0 ICD-9-CM: 600.00  9/9/2015 Yes Hypertension (Chronic) ICD-10-CM: I10 
ICD-9-CM: 401.9  Unknown Yes Overview Signed 8/21/2015  8:46 AM by Mike mckinnon w/med Patient with recurrent exudative effusion with heavy MSSA in sputum. Also in setting of pericarditis. The effusion could be parapneumonic in nature or related to his pericardial process. Both are being treated and he is clinically improved though having some difficulty tolerating Plan -cont amoxicillin, only have 500mg tabs here but on discharge can be 875mg bid x 7 days total for MSSA PNA.  
-cont to monitor symptoms. When ready for discharge (ok from pulmonary standpoint) will need outpatient pulmonary f/u in 1-2 weeks with repeat CXR. -pleural fluid cultures negative. No additional pulmonary input at this time. We will sign off but please let us know if new issues arise. Nafisa Clark MD 
 
More than 50% of time documented was spent in face-to-face contact with the patient and in the care of the patient on the floor/unit where the patient is located.

## 2018-09-02 NOTE — PROGRESS NOTES
Discharge instructions and prescriptions  given and patient voiced understanding. Denies pain or shortness of breath. Patient discharged via wheelchair to home accompanied by staff

## 2018-09-02 NOTE — PROGRESS NOTES
9/2/2018 11:12 AM 
 
Admit Date: 8/29/2018 Subjective:  
 
Lisa Conley reports feeling better Has been walking the halls Tolerating colchicine better. Objective:  
  
Visit Vitals  /87 (BP 1 Location: Right arm, BP Patient Position: At rest)  Pulse 86  Temp 98.8 °F (37.1 °C)  Resp 18  Ht 5' 8\" (1.727 m)  Wt 77.3 kg (170 lb 6.4 oz)  SpO2 93%  BMI 25.91 kg/m2 Physical Exam: 
Heart: regular rate and rhythm, no rub Lungs: clear to auscultation bilaterally Data Review:  
Labs:   
Recent Results (from the past 24 hour(s)) TROPONIN I Collection Time: 09/01/18  1:09 PM  
Result Value Ref Range Troponin-I, Qt. 0.02 0.02 - 0.05 NG/ML  
TROPONIN I Collection Time: 09/01/18  8:15 PM  
Result Value Ref Range Troponin-I, Qt. 0.03 0.02 - 0.05 NG/ML  
CBC W/O DIFF Collection Time: 09/02/18  9:02 AM  
Result Value Ref Range WBC 8.3 4.3 - 11.1 K/uL  
 RBC 4.02 (L) 4.23 - 5.6 M/uL HGB 9.0 (L) 13.6 - 17.2 g/dL HCT 29.8 (L) 41.1 - 50.3 % MCV 74.1 (L) 79.6 - 97.8 FL  
 MCH 22.4 (L) 26.1 - 32.9 PG  
 MCHC 30.2 (L) 31.4 - 35.0 g/dL  
 RDW 18.0 % PLATELET 096 (H) 070 - 450 K/uL MPV 10.0 9.4 - 12.3 FL ABSOLUTE NRBC 0.00 0.0 - 0.2 K/uL Telemetry: normal sinus rhythm Assessment:  
 
Patient Active Problem List  
 Diagnosis Date Noted  Community acquired bacterial pneumonia heavy staph 08/29/2018  Peptic ulcer disease 08/29/2018  Pericarditis with effusion feels better on colchicine and NSAID 08/29/2018  Pleural effusion, right 08/29/2018  Pleural effusion, left 08/29/2018 [facility] Obi Eddy 08/29/2018  Upper GI bleed 05/19/2018  Acute blood loss anemia 05/19/2018  Coronary artery disease involving native coronary artery of native heart without angina pectoris 11/14/2016  DAVID (obstructive sleep apnea) 11/14/2016  Arthritis of shoulder region, right, degenerative 10/28/2015  BPH (benign prostatic hyperplasia) 09/09/2015  Allergic rhinitis  Hypercholesterolemia  Hypertension  Restless leg syndrome  IBS (irritable bowel syndrome) Plan:  
Continue meds Home soon

## 2018-09-02 NOTE — PROGRESS NOTES
Assumed care of patient. Assessment completed and documented, see docflow. Patient sitting up in bed, eating breakfast. Patient requested and received nausea meds; see MAR. Patient A/Ox3. Able to verbalize needs. NAD noted at present. Respirations even and non labored on RA. Patient verbalized understanding to call for needs. Call light within reach. Will continue to monitor.

## 2018-09-02 NOTE — DISCHARGE SUMMARY
Hospitalist Discharge Summary     Patient ID:  Cortez Reinoso  [id number]  34 y.o.  1940  Admit date: 8/29/2018 11:37 AM  Discharge date and time: 9/2/2018  Attending: Pierre Moritz, MD  PCP:  Jesus Alberto Husain MD  Treatment Team: Attending Provider: Pierre Moritz, MD; Consulting Provider: Marla Shaver MD; Utilization Review: Tee Nazario RN; Care Manager: Mickey Soto RN; Utilization Review: Gerardo Meeks RN    Principal Diagnosis   Acute respiratory failure with hypoxia [facility])  Community acquired bacterial pneumonia  MSSA pneumonia  Chest pain due pericarditis; resolving           Active Problems:    Hypertension ()      Overview: controlled w/med      BPH (benign prostatic hyperplasia) (9/9/2015)      DAVID (obstructive sleep apnea) (11/14/2016)      Community acquired bacterial pneumonia (8/29/2018)      Peptic ulcer disease (8/29/2018)      Pericarditis with effusion (8/29/2018)      Pleural effusion, right (8/29/2018)      Pleural effusion, left (8/29/2018)      [place] (8/29/2018)             Hospital Course:  Please refer to the admission H&P for details of presentation. In summary, the patient is 78 M with PMH of CAD s/p stent, GI Bleed, Peptic ulcers, HLD, HTN was sent to the ER on 8/29 by PCP for abnormal Labs and suspected infection and SOB. Pt was noted to have bilateral pleural effusion L>>R, had removal of 1100 ml of fluid from left. Pt started on rocephin/azithro for LLL CAP, received vanc and zosyn in ER. Recently discharged from [facility] after being treated for acute viral pericarditis, treated initially with colchicine, and later with NSAID. Pt was restarted back on colchicine by Cardiology. Recommends to complete 3-6 months with colchicine and Indomethacin. Echo showed trace pericardia effusion with normal EF55-60%.   Pt does report loose stools with colchicine, but has been advised to continue by cardiology team.  Pulmonary did another thoracentesis on left with removal of 700 cc, and right with removal of 600 cc. Fluid culture was negative but sputum showed heavy MSSA. Initial antibiotics for community acquired PNA, azithro and rocephin were switched to amoxicillin. Will be discharged on 7 days of amoxicillin 875 mg PO BID. He is advised to follow up with his cardiologist in 1-2 weeks. Pt was weaned off supplemental oxygen within 24-48 hours, and was stable on room air. Rest of the hospital course was uneventful. Significant Diagnostic Studies:   Echo:  SUMMARY:    -  Left ventricle: Systolic function was normal. Ejection fraction was  estimated in the range of 55 % to 60 %. There were no regional wall motion  abnormalities. -  Left atrium: The atrium was mildly dilated. -  Aortic valve: There was mild to moderate regurgitation. Derotha Hals, systemic arteries: There was mild dilatation of the ascending   aorta. -  Pericardium: A trace to small pericardial effusion was identified   posterior  to the heart of no hemodynamic significance. There was a left pleural   Effusion. 2 View Chest X-Ray 9/2/2018 8:20 AM     INDICATION: Left-sided inspiratory chest pain, shortness of breath. 2 recent  thoracenteses.     COMPARISON: 9/1/2018     FINDINGS: Upright PA and Lateral views are submitted. There is persistent right  hemidiaphragm elevation which is similar to before. Overall there is mildly  improved inflation of the lungs. Cardiomediastinal silhouette stable. No overt  edema. There continues to be some hazy increased opacity at the left base and a  small pleural meniscus. This is not significantly changed.     IMPRESSION  IMPRESSION: Chest appearance similar to the prior study-as above. There does  appear to be mildly better inflation of the lungs today.     Labs: Results:       Chemistry Recent Labs      09/01/18   0636  08/31/18   0641   GLU  94  93   NA  142  143   K  3.5  3.7   CL  106  106   CO2  29  29   BUN  7*  6*   CREA  0.52*  0.52*   CA  8.0*  7.9*   AGAP  7  8 CBC w/Diff Recent Labs      09/02/18   0902  09/01/18   0636  08/31/18   0641   WBC  8.3  7.7  7.3   RBC  4.02*  3.87*  3.92*   HGB  9.0*  8.7*  8.8*   HCT  29.8*  28.8*  29.6*   PLT  613*  549*  507*      Cardiac Enzymes No results for input(s): CPK, CKND1, KEITH in the last 72 hours. No lab exists for component: CKRMB, TROIP   Coagulation No results for input(s): PTP, INR, APTT in the last 72 hours. No lab exists for component: INREXT    Lipid Panel Lab Results   Component Value Date/Time    Cholesterol, total 139 01/12/2018 09:12 AM    HDL Cholesterol 56 01/12/2018 09:12 AM    LDL, calculated 70 01/12/2018 09:12 AM    VLDL, calculated 13 01/12/2018 09:12 AM    Triglyceride 67 01/12/2018 09:12 AM      BNP No results for input(s): BNPP in the last 72 hours. Liver Enzymes No results for input(s): TP, ALB, TBIL, AP, SGOT, GPT in the last 72 hours. No lab exists for component: DBIL   Thyroid Studies Lab Results   Component Value Date/Time    TSH 2.410 01/12/2018 09:12 AM            Discharge Exam:  Visit Vitals    /87 (BP 1 Location: Right arm, BP Patient Position: At rest)    Pulse 86    Temp 98.8 °F (37.1 °C)    Resp 18    Ht 5' 8\" (1.727 m)    Wt 77.3 kg (170 lb 6.4 oz)    SpO2 93%    BMI 25.91 kg/m2     General:                  NAD  Head:                                   NCAT.   No obvious deformity  Nose:                                   Nares normal. No drainage  Lungs:                     CTAB, fine bibasilar crackles (improved from yesterday)  Heart:                                  RRR.  No m/r/g. Abdomen:                  S/nt/nd.  Bowel sounds normal.   Extremities:               No cyanosis.    Skin:                                    No rashes or lesions.  Not Jaundiced  Neurologic:               GCS 15, no motor or sensory deficits, CN 2-12 intact    Disposition: home  Discharge Condition: stable  Patient Instructions:   Current Discharge Medication List      START taking these medications    Details   indomethacin (INDOCIN) 50 mg capsule Take 1 Cap by mouth three (3) times daily (with meals) for 90 days. Qty: 90 Cap, Refills: 2      amoxicillin (AMOXIL) 875 mg tablet Take 1 Tab by mouth two (2) times a day for 7 days. Qty: 14 Tab, Refills: 0         CONTINUE these medications which have CHANGED    Details   colchicine (COLCRYS) 0.6 mg tablet Take 1 Tab by mouth two (2) times a day for 30 days. Qty: 60 Tab, Refills: 2      traMADol (ULTRAM) 50 mg tablet Take 1 Tab by mouth every six (6) hours as needed. Max Daily Amount: 200 mg. Qty: 40 Tab, Refills: 1    Associated Diagnoses: Primary osteoarthritis of right shoulder         CONTINUE these medications which have NOT CHANGED    Details   metoprolol succinate (TOPROL-XL) 100 mg tablet Take 1 tablet by mouth once daily  Indications: hypertension  Qty: 90 Tab, Refills: 1    Comments: Pt's Metoprolol increased from 50 mg to 100 mg instead of prescribing another medication. amitriptyline (ELAVIL) 10 mg tablet TAKE 1 TABLET ONCE DAILY   FOR GASTROINTESTINAL UPSET PREVENTION/IRRITABLE BOWEL SYNDROME  Qty: 90 Tab, Refills: 3      isosorbide mononitrate ER (IMDUR) 30 mg tablet TAKE 1 TABLET DAILY FOR    CHRONIC STABLE ANGINA      PECTORIS  Qty: 90 Tab, Refills: 3      clopidogrel (PLAVIX) 75 mg tab TAKE 1 TABLET DAILY  Qty: 90 Tab, Refills: 3      lisinopril (PRINIVIL, ZESTRIL) 5 mg tablet TAKE 1 TABLET ONCE DAILY   FOR HYPERTENSION  Qty: 90 Tab, Refills: 3      pramipexole (MIRAPEX) 0.5 mg tablet TAKE 1 TABLET NIGHTLY FOR    RESTLESS LEGS SYNDROME  Qty: 90 Tab, Refills: 3      pantoprazole (PROTONIX) 40 mg tablet Take 1 Tab by mouth two (2) times a day. Qty: 60 Tab, Refills: 2      cpap machine kit by Does Not Apply route. nitroglycerin (NITROSTAT) 0.4 mg SL tablet 1 Tab by SubLINGual route every five (5) minutes as needed for Chest Pain (Last reported use was 4/12/15 approx).   Qty: 30 Tab, Refills: 5      acetaminophen (TYLENOL ARTHRITIS PAIN) 650 mg CR tablet Take 650 mg by mouth every six (6) hours as needed for Pain. Take / use AM day of surgery  per anesthesia protocols if needed      aspirin delayed-release 81 mg tablet Take 81 mg by mouth daily. Take / use AM day of surgery  per anesthesia protocols. STOP taking these medications       simvastatin (ZOCOR) 20 mg tablet Comments:   Reason for Stopping:               Activity: Activity as tolerated  Diet: Cardiac Diet  Wound Care: None needed    Follow-up  ·   Follow up with Pulmonary in 2-3 weeks as scheduled  · Follow up with Cardiology in 1-2 weeks. · Follow up with pcp in 1 week.   Time spent to discharge patient 35 minutes  Signed:  Princess Fournier MD  9/2/2018  10:45 AM

## 2018-09-03 ENCOUNTER — PATIENT OUTREACH (OUTPATIENT)
Dept: CASE MANAGEMENT | Age: 78
End: 2018-09-03

## 2018-09-03 LAB
BACTERIA SPEC CULT: NORMAL
BACTERIA SPEC CULT: NORMAL
SERVICE CMNT-IMP: NORMAL
SERVICE CMNT-IMP: NORMAL

## 2018-09-18 ENCOUNTER — APPOINTMENT (RX ONLY)
Dept: URBAN - METROPOLITAN AREA CLINIC 23 | Facility: CLINIC | Age: 78
Setting detail: DERMATOLOGY
End: 2018-09-18

## 2018-09-18 DIAGNOSIS — D18.0 HEMANGIOMA: ICD-10-CM

## 2018-09-18 DIAGNOSIS — L57.0 ACTINIC KERATOSIS: ICD-10-CM

## 2018-09-18 DIAGNOSIS — L81.4 OTHER MELANIN HYPERPIGMENTATION: ICD-10-CM

## 2018-09-18 DIAGNOSIS — D485 NEOPLASM OF UNCERTAIN BEHAVIOR OF SKIN: ICD-10-CM

## 2018-09-18 DIAGNOSIS — D22 MELANOCYTIC NEVI: ICD-10-CM

## 2018-09-18 PROBLEM — D48.5 NEOPLASM OF UNCERTAIN BEHAVIOR OF SKIN: Status: ACTIVE | Noted: 2018-09-18

## 2018-09-18 PROBLEM — D22.5 MELANOCYTIC NEVI OF TRUNK: Status: ACTIVE | Noted: 2018-09-18

## 2018-09-18 PROBLEM — J30.1 ALLERGIC RHINITIS DUE TO POLLEN: Status: ACTIVE | Noted: 2018-09-18

## 2018-09-18 PROBLEM — D18.01 HEMANGIOMA OF SKIN AND SUBCUTANEOUS TISSUE: Status: ACTIVE | Noted: 2018-09-18

## 2018-09-18 PROCEDURE — ? COUNSELING

## 2018-09-18 PROCEDURE — ? LIQUID NITROGEN

## 2018-09-18 PROCEDURE — ? OTHER

## 2018-09-18 PROCEDURE — 17000 DESTRUCT PREMALG LESION: CPT

## 2018-09-18 PROCEDURE — 99213 OFFICE O/P EST LOW 20 MIN: CPT | Mod: 25

## 2018-09-18 PROCEDURE — 17003 DESTRUCT PREMALG LES 2-14: CPT

## 2018-09-18 ASSESSMENT — LOCATION ZONE DERM
LOCATION ZONE: ARM
LOCATION ZONE: LEG
LOCATION ZONE: TRUNK
LOCATION ZONE: NECK
LOCATION ZONE: FACE

## 2018-09-18 ASSESSMENT — LOCATION DETAILED DESCRIPTION DERM
LOCATION DETAILED: LEFT CENTRAL TEMPLE
LOCATION DETAILED: LEFT CLAVICULAR NECK
LOCATION DETAILED: RIGHT DISTAL DORSAL FOREARM
LOCATION DETAILED: RIGHT INFERIOR CENTRAL MALAR CHEEK
LOCATION DETAILED: RIGHT SUPERIOR MEDIAL UPPER BACK
LOCATION DETAILED: PERIUMBILICAL SKIN
LOCATION DETAILED: LEFT MEDIAL INFERIOR CHEST
LOCATION DETAILED: RIGHT INFERIOR UPPER BACK
LOCATION DETAILED: LEFT DISTAL DORSAL FOREARM
LOCATION DETAILED: LEFT ANTERIOR PROXIMAL THIGH
LOCATION DETAILED: INFERIOR THORACIC SPINE

## 2018-09-18 ASSESSMENT — LOCATION SIMPLE DESCRIPTION DERM
LOCATION SIMPLE: CHEST
LOCATION SIMPLE: ABDOMEN
LOCATION SIMPLE: LEFT TEMPLE
LOCATION SIMPLE: RIGHT FOREARM
LOCATION SIMPLE: RIGHT UPPER BACK
LOCATION SIMPLE: UPPER BACK
LOCATION SIMPLE: RIGHT CHEEK
LOCATION SIMPLE: LEFT ANTERIOR NECK
LOCATION SIMPLE: LEFT FOREARM
LOCATION SIMPLE: LEFT THIGH

## 2018-09-18 NOTE — PROCEDURE: OTHER
Note Text (......Xxx Chief Complaint.): This diagnosis correlates with the
Detail Level: Simple
Other (Free Text): Resolved

## 2018-09-18 NOTE — PROCEDURE: LIQUID NITROGEN
Duration Of Freeze Thaw-Cycle (Seconds): 6
Render Post-Care Instructions In Note?: no
Number Of Freeze-Thaw Cycles: 2 freeze-thaw cycles
Consent: The patient's consent was obtained including but not limited to risks of crusting, scabbing, blistering, scarring, darker or lighter pigmentary change, recurrence, incomplete removal and infection.
Post-Care Instructions: I reviewed with the patient in detail post-care instructions. Patient is to wear sunprotection, and avoid picking at any of the treated lesions. Pt may apply Vaseline to crusted or scabbing areas.
Detail Level: Simple

## 2018-09-19 ENCOUNTER — HOSPITAL ENCOUNTER (OUTPATIENT)
Dept: GENERAL RADIOLOGY | Age: 78
Discharge: HOME | End: 2018-09-19
Payer: MEDICARE

## 2018-09-19 DIAGNOSIS — Z09 HOSPITAL DISCHARGE FOLLOW-UP: ICD-10-CM

## 2018-09-19 PROBLEM — J15.9 COMMUNITY ACQUIRED BACTERIAL PNEUMONIA: Status: RESOLVED | Noted: 2018-08-29 | Resolved: 2018-09-19

## 2018-09-19 PROCEDURE — 71046 X-RAY EXAM CHEST 2 VIEWS: CPT

## 2018-09-27 LAB
FUNGUS CULTURE, RFCO2T: NEGATIVE
FUNGUS SMEAR, RFCO1T: NORMAL
FUNGUS SPEC CULT: NORMAL
FUNGUS STAIN, 188244: NORMAL
REFLEX TO ID, RFCO3T: NORMAL
SPECIMEN SOURCE: NORMAL
SPECIMEN SOURCE: NORMAL

## 2018-09-28 LAB
FUNGUS CULTURE, RFCO2T: NORMAL
FUNGUS SMEAR, RFCO1T: NORMAL
FUNGUS SPEC CULT: NORMAL
FUNGUS STAIN, 188244: NORMAL
REFLEX TO ID, RFCO3T: NORMAL
SPECIMEN SOURCE: NORMAL
SPECIMEN SOURCE: NORMAL

## 2018-10-13 LAB
ACID FAST STN SPEC: NEGATIVE
MYCOBACTERIUM SPEC QL CULT: NEGATIVE
SPECIMEN PREPARATION: NORMAL
SPECIMEN SOURCE: NORMAL

## 2018-10-17 PROBLEM — D62 ACUTE BLOOD LOSS ANEMIA: Status: RESOLVED | Noted: 2018-05-19 | Resolved: 2018-10-17

## 2018-10-17 PROBLEM — K92.2 UPPER GI BLEED: Status: RESOLVED | Noted: 2018-05-19 | Resolved: 2018-10-17

## 2018-12-03 ENCOUNTER — HOSPITAL ENCOUNTER (OUTPATIENT)
Dept: GENERAL RADIOLOGY | Age: 78
Discharge: HOME | End: 2018-12-03
Payer: MEDICARE

## 2018-12-03 DIAGNOSIS — J90 PLEURAL EFFUSION, RIGHT: ICD-10-CM

## 2018-12-03 DIAGNOSIS — J90 PLEURAL EFFUSION, LEFT: ICD-10-CM

## 2018-12-03 PROCEDURE — 71046 X-RAY EXAM CHEST 2 VIEWS: CPT

## 2019-02-06 ENCOUNTER — HOSPITAL ENCOUNTER (OUTPATIENT)
Dept: MRI IMAGING | Age: 79
Discharge: HOME | End: 2019-02-06
Attending: OTOLARYNGOLOGY
Payer: MEDICARE

## 2019-02-06 PROCEDURE — 74011250636 HC RX REV CODE- 250/636: Performed by: OTOLARYNGOLOGY

## 2019-02-06 PROCEDURE — 70553 MRI BRAIN STEM W/O & W/DYE: CPT

## 2019-02-06 PROCEDURE — A9575 INJ GADOTERATE MEGLUMI 0.1ML: HCPCS | Performed by: OTOLARYNGOLOGY

## 2019-02-06 RX ORDER — SODIUM CHLORIDE 0.9 % (FLUSH) 0.9 %
10 SYRINGE (ML) INJECTION
Status: COMPLETED | OUTPATIENT
Start: 2019-02-06 | End: 2019-02-06

## 2019-02-06 RX ORDER — GADOTERATE MEGLUMINE 376.9 MG/ML
16 INJECTION INTRAVENOUS
Status: COMPLETED | OUTPATIENT
Start: 2019-02-06 | End: 2019-02-06

## 2019-02-06 RX ADMIN — GADOTERATE MEGLUMINE 16 ML: 376.9 INJECTION INTRAVENOUS at 10:24

## 2019-02-06 RX ADMIN — Medication 10 ML: at 10:24

## 2019-02-07 ENCOUNTER — ANESTHESIA EVENT (OUTPATIENT)
Dept: ENDOSCOPY | Age: 79
End: 2019-02-07
Payer: MEDICARE

## 2019-02-07 RX ORDER — SODIUM CHLORIDE, SODIUM LACTATE, POTASSIUM CHLORIDE, CALCIUM CHLORIDE 600; 310; 30; 20 MG/100ML; MG/100ML; MG/100ML; MG/100ML
100 INJECTION, SOLUTION INTRAVENOUS CONTINUOUS
Status: CANCELLED | OUTPATIENT
Start: 2019-02-07

## 2019-02-08 ENCOUNTER — HOSPITAL ENCOUNTER (OUTPATIENT)
Age: 79
Setting detail: OUTPATIENT SURGERY
Discharge: HOME | End: 2019-02-08
Attending: INTERNAL MEDICINE | Admitting: INTERNAL MEDICINE
Payer: MEDICARE

## 2019-02-08 ENCOUNTER — ANESTHESIA (OUTPATIENT)
Dept: ENDOSCOPY | Age: 79
End: 2019-02-08
Payer: MEDICARE

## 2019-02-08 VITALS
WEIGHT: 170 LBS | RESPIRATION RATE: 16 BRPM | BODY MASS INDEX: 25.76 KG/M2 | TEMPERATURE: 98 F | OXYGEN SATURATION: 97 % | HEART RATE: 62 BPM | HEIGHT: 68 IN | SYSTOLIC BLOOD PRESSURE: 124 MMHG | DIASTOLIC BLOOD PRESSURE: 63 MMHG

## 2019-02-08 PROCEDURE — 88312 SPECIAL STAINS GROUP 1: CPT

## 2019-02-08 PROCEDURE — 76040000025: Performed by: INTERNAL MEDICINE

## 2019-02-08 PROCEDURE — 74011250636 HC RX REV CODE- 250/636: Performed by: ANESTHESIOLOGY

## 2019-02-08 PROCEDURE — 74011250636 HC RX REV CODE- 250/636

## 2019-02-08 PROCEDURE — 77030009426 HC FCPS BIOP ENDOSC BSC -B: Performed by: INTERNAL MEDICINE

## 2019-02-08 PROCEDURE — 88305 TISSUE EXAM BY PATHOLOGIST: CPT

## 2019-02-08 PROCEDURE — 76060000031 HC ANESTHESIA FIRST 0.5 HR: Performed by: INTERNAL MEDICINE

## 2019-02-08 RX ORDER — LIDOCAINE HYDROCHLORIDE 20 MG/ML
INJECTION, SOLUTION EPIDURAL; INFILTRATION; INTRACAUDAL; PERINEURAL AS NEEDED
Status: DISCONTINUED | OUTPATIENT
Start: 2019-02-08 | End: 2019-02-08 | Stop reason: HOSPADM

## 2019-02-08 RX ORDER — PROPOFOL 10 MG/ML
INJECTION, EMULSION INTRAVENOUS AS NEEDED
Status: DISCONTINUED | OUTPATIENT
Start: 2019-02-08 | End: 2019-02-08 | Stop reason: HOSPADM

## 2019-02-08 RX ORDER — PROPOFOL 10 MG/ML
INJECTION, EMULSION INTRAVENOUS
Status: DISCONTINUED | OUTPATIENT
Start: 2019-02-08 | End: 2019-02-08 | Stop reason: HOSPADM

## 2019-02-08 RX ORDER — SODIUM CHLORIDE, SODIUM LACTATE, POTASSIUM CHLORIDE, CALCIUM CHLORIDE 600; 310; 30; 20 MG/100ML; MG/100ML; MG/100ML; MG/100ML
100 INJECTION, SOLUTION INTRAVENOUS CONTINUOUS
Status: DISCONTINUED | OUTPATIENT
Start: 2019-02-08 | End: 2019-02-08 | Stop reason: HOSPADM

## 2019-02-08 RX ADMIN — PROPOFOL 20 MG: 10 INJECTION, EMULSION INTRAVENOUS at 09:19

## 2019-02-08 RX ADMIN — PROPOFOL 40 MG: 10 INJECTION, EMULSION INTRAVENOUS at 09:17

## 2019-02-08 RX ADMIN — SODIUM CHLORIDE, SODIUM LACTATE, POTASSIUM CHLORIDE, AND CALCIUM CHLORIDE 100 ML/HR: 600; 310; 30; 20 INJECTION, SOLUTION INTRAVENOUS at 09:04

## 2019-02-08 RX ADMIN — PROPOFOL 100 MCG/KG/MIN: 10 INJECTION, EMULSION INTRAVENOUS at 09:17

## 2019-02-08 RX ADMIN — LIDOCAINE HYDROCHLORIDE 30 MG: 20 INJECTION, SOLUTION EPIDURAL; INFILTRATION; INTRACAUDAL; PERINEURAL at 09:17

## 2019-02-08 NOTE — DISCHARGE INSTRUCTIONS
Gastrointestinal Esophagogastroduodenoscopy (EGD) - Upper Exam Discharge Instructions    1. Call Dr. Nanette Osullivan at [phone] for any problems or questions. 2. Contact the doctor's office for follow up appointment as directed. 3. Medication may cause drowsiness for several hours, therefore, do not drive or operate machinery for remainder of the day. 4. No alcohol today. 5. Ordinarily, you may resume regular diet and activity after exam unless otherwise specified by your physician. 6. For mild soreness in your throat you may use Cepacol throat lozenges or warm salt-water gargles as needed. Any additional instructions:  1. Office visit in 2-3 months  2.  Continue pantoprazole (Protonix) twice daily

## 2019-02-08 NOTE — H&P
PreProcedure H&P Update Today's Date:  2/8/2019 CC:  Hx gastric ulcer Subjective: HPI:  Hx gastric ulcer PMH: 
Past Medical History:  
Diagnosis Date  Acute respiratory failure ([facility] 08/29/2018 LLL pneumonia with bilateral pleural effusions- admission [address]  Allergic rhinitis  Anemia  Aortic insufficiency  Aortic sclerosis   
 no stenosis- ECHO 9/8/14  LVEF 50-55%  BPH with urinary obstruction   
 resolved with surgery  CAD (coronary artery disease) stent X1 in 2010. .managed with medications  Chronic pain   
 right shoulder  Colon polyps  Depression  Diverticulitis  Dyslipidemia   
 managed with medications  Dyslipidemia  Former pipe smoker   
 stopped at age 30  GERD (gastroesophageal reflux disease)   
 managed with PRN medications  GI bleed   
 from Plavix  Heart murmur  History of depression  Hypercholesterolemia  Hypertension   
 controlled w/med  IBS (irritable bowel syndrome)   
 manged with medications  Ill-defined condition   
 pericarditis  Macular degeneration   
 managed with medications  Macular degeneration  Non-STEMI (non-ST elevated myocardial infarction) ([facility] 09/2015  Old MI (myocardial infarction) x2; last 9/2014  Osteoarthritis   
 in shoulders and back  Pleural effusion  PUD (peptic ulcer disease) 3/2011  
 no recent episodes  PUD (peptic ulcer disease)  Restless leg syndrome   
 manged with medications  Seasonal allergic rhinitis PRN medications  Unspecified sleep apnea   
 wears cpap Medications:  
Current Facility-Administered Medications Medication Dose Route Frequency  lactated Ringers infusion  100 mL/hr IntraVENous CONTINUOUS Objective:  
Vitals: 
Visit Vitals /57 Pulse 63 Temp 98 °F (36.7 °C) Resp 15 Ht 5' 8\" (1.727 m) Wt 77.1 kg (170 lb) SpO2 97% BMI 25.85 kg/m² Exam: General appearance: alert, cooperative, no distress Lungs: clear to auscultation bilaterally anteriorly Heart: regular rate and rhythm Abdomen: soft, non-tender. Bowel sounds normal. No masses, no organomegaly Data Review (Labs): No results for input(s): WBC, HGB, HCT, PLT, MCV, NA, K, CL, CO2, BUN, CREA, CA, GLU, AP, SGOT, GPT, TBIL, CBIL, ALB, TP, AML, LPSE, PTP, INR, APTT, HGBEXT, HCTEXT, PLTEXT in the last 72 hours. No lab exists for component: DBIL, INREXT Plan: Hx gastric ulcer. Patient now off indocin and colchicine. Proceed with EGD.

## 2019-02-08 NOTE — ROUTINE PROCESS
VSS. No complaints noted. Education reviewed and signed with patient and wife. Pt discharged via wheelchair by Teetee Gonzalez, [facility].

## 2019-02-08 NOTE — PROCEDURES
Esophagogastroduodenoscopy    DATE of PROCEDURE: 2/8/2019    INDICATION: Hx     POSTPROCEDURE DIAGNOSIS: two gastric ulcers    MEDICATIONS ADMINISTERED: MAC anesthesia (see anesthesia report)    INSTRUMENT:    PROCEDURE:  After obtaining informed consent, the patient was placed in the left lateral position and sedated. The endoscope was advanced under direct vision without difficulty. The esophagus, stomach (including retroflexed views) and duodenum were evaluated. The patient was taken to the recovery area in stable condition. FINDINGS:  ESOPHAGUS: normal  STOMACH: small hiatal hernia; two benign appearing fibrin based < = 1 cm ulcer in antrum (one slightly anterior, one posterior). Cold biopsies taken of both. DUODENUM: normal    Estimated blood loss: 0-minimal   Specimens obtained during procedure: yes    PLAN: Await path. Continue PPI BID. Patient has been off indocin for over a month. Was recently off colchicine. Office visit to review in 2-3 mo.

## 2019-02-08 NOTE — ANESTHESIA POSTPROCEDURE EVALUATION
Procedure(s): ESOPHAGOGASTRODUODENOSCOPY (EGD)/ 26 ESOPHAGOGASTRODUODENAL (EGD) BIOPSY. Anesthesia Post Evaluation Multimodal analgesia: multimodal analgesia used between 6 hours prior to anesthesia start to PACU discharge Patient location during evaluation: bedside Patient participation: complete - patient participated Level of consciousness: awake and responsive to light touch Pain management: adequate Airway patency: patent Anesthetic complications: no 
Cardiovascular status: acceptable, hemodynamically stable, blood pressure returned to baseline and stable Respiratory status: acceptable, unassisted, spontaneous ventilation and nonlabored ventilation Hydration status: acceptable Post anesthesia nausea and vomiting:  controlled Visit Vitals /63 Pulse 62 Temp 36.7 °C (98 °F) Resp 16 Ht 5' 8\" (1.727 m) Wt 77.1 kg (170 lb) SpO2 97% BMI 25.85 kg/m²

## 2019-02-08 NOTE — ANESTHESIA PREPROCEDURE EVALUATION
Anesthetic History No history of anesthetic complications Review of Systems / Medical History Patient summary reviewed and pertinent labs reviewed Pulmonary Sleep apnea: CPAP Neuro/Psych Within defined limits Cardiovascular Hypertension: well controlled Valvular problems/murmurs: aortic insufficiency CAD and cardiac stents (stent X1 in 2010) Exercise tolerance: >4 METS Comments: 8/2018 preserved LVEF, mild-moderate AI. Mild pHTN. H/o pericarditis 6 mos. Ago, now CP free, has been gaining energy, no orthopnea or Sx of HF  
GI/Hepatic/Renal 
  
GERD: well controlled Comments: GIB requiring blood transfusion Endo/Other Arthritis Other Findings Physical Exam 
 
Airway Mallampati: II 
TM Distance: 4 - 6 cm Neck ROM: normal range of motion Cardiovascular Regular rate and rhythm,  S1 and S2 normal,  no murmur, click, rub, or gallop Rhythm: regular Rate: normal 
 
 
Pertinent negatives: No murmur Dental 
 
Dentition: Poor dentition Pulmonary Breath sounds clear to auscultation Abdominal 
GI exam deferred Other Findings Anesthetic Plan ASA: 3 Anesthesia type: total IV anesthesia Induction: Intravenous Anesthetic plan and risks discussed with: Patient

## 2019-06-28 ENCOUNTER — HOSPITAL ENCOUNTER (OUTPATIENT)
Dept: GENERAL RADIOLOGY | Age: 79
Discharge: HOME | End: 2019-06-28
Attending: INTERNAL MEDICINE
Payer: MEDICARE

## 2019-06-28 DIAGNOSIS — R13.19 ESOPHAGEAL DYSPHAGIA: ICD-10-CM

## 2019-06-28 PROCEDURE — 74220 X-RAY XM ESOPHAGUS 1CNTRST: CPT

## 2019-06-28 PROCEDURE — 74011000250 HC RX REV CODE- 250: Performed by: INTERNAL MEDICINE

## 2019-06-28 PROCEDURE — 74011000255 HC RX REV CODE- 255: Performed by: INTERNAL MEDICINE

## 2019-06-28 RX ADMIN — BARIUM SULFATE 150 ML: 0.6 SUSPENSION ORAL at 11:20

## 2019-06-28 RX ADMIN — ANTACID/ANTIFLATULENT 4 G: 380; 550; 10; 10 GRANULE, EFFERVESCENT ORAL at 11:20

## 2019-06-28 RX ADMIN — BARIUM SULFATE 135 ML: 980 POWDER, FOR SUSPENSION ORAL at 11:20

## 2019-06-28 RX ADMIN — BARIUM SULFATE 700 MG: 700 TABLET ORAL at 11:20

## 2019-09-17 ENCOUNTER — APPOINTMENT (RX ONLY)
Dept: URBAN - METROPOLITAN AREA CLINIC 23 | Facility: CLINIC | Age: 79
Setting detail: DERMATOLOGY
End: 2019-09-17

## 2019-09-17 DIAGNOSIS — D18.0 HEMANGIOMA: ICD-10-CM

## 2019-09-17 DIAGNOSIS — Z71.89 OTHER SPECIFIED COUNSELING: ICD-10-CM

## 2019-09-17 DIAGNOSIS — L57.0 ACTINIC KERATOSIS: ICD-10-CM

## 2019-09-17 DIAGNOSIS — L82.1 OTHER SEBORRHEIC KERATOSIS: ICD-10-CM

## 2019-09-17 DIAGNOSIS — L20.89 OTHER ATOPIC DERMATITIS: ICD-10-CM

## 2019-09-17 DIAGNOSIS — L81.4 OTHER MELANIN HYPERPIGMENTATION: ICD-10-CM

## 2019-09-17 DIAGNOSIS — D22 MELANOCYTIC NEVI: ICD-10-CM

## 2019-09-17 PROBLEM — L20.84 INTRINSIC (ALLERGIC) ECZEMA: Status: ACTIVE | Noted: 2019-09-17

## 2019-09-17 PROBLEM — D18.01 HEMANGIOMA OF SKIN AND SUBCUTANEOUS TISSUE: Status: ACTIVE | Noted: 2019-09-17

## 2019-09-17 PROBLEM — F41.9 ANXIETY DISORDER, UNSPECIFIED: Status: ACTIVE | Noted: 2019-09-17

## 2019-09-17 PROBLEM — D22.5 MELANOCYTIC NEVI OF TRUNK: Status: ACTIVE | Noted: 2019-09-17

## 2019-09-17 PROCEDURE — ? PRESCRIPTION

## 2019-09-17 PROCEDURE — ? LIQUID NITROGEN

## 2019-09-17 PROCEDURE — ? OTHER

## 2019-09-17 PROCEDURE — 99214 OFFICE O/P EST MOD 30 MIN: CPT | Mod: 25

## 2019-09-17 PROCEDURE — 17000 DESTRUCT PREMALG LESION: CPT

## 2019-09-17 PROCEDURE — ? COUNSELING

## 2019-09-17 RX ORDER — TRIAMCINOLONE ACETONIDE 1 MG/G
OINTMENT TOPICAL
Qty: 1 | Refills: 2 | Status: ERX

## 2019-09-17 ASSESSMENT — LOCATION SIMPLE DESCRIPTION DERM
LOCATION SIMPLE: RIGHT PRETIBIAL REGION
LOCATION SIMPLE: RIGHT CHEEK
LOCATION SIMPLE: ABDOMEN
LOCATION SIMPLE: RIGHT LOWER BACK
LOCATION SIMPLE: LEFT THIGH
LOCATION SIMPLE: RIGHT SHOULDER
LOCATION SIMPLE: LEFT POSTERIOR THIGH
LOCATION SIMPLE: LEFT CHEEK
LOCATION SIMPLE: LEFT UPPER BACK
LOCATION SIMPLE: LEFT SCALP
LOCATION SIMPLE: CHEST
LOCATION SIMPLE: LEFT PRETIBIAL REGION
LOCATION SIMPLE: RIGHT ANTERIOR NECK
LOCATION SIMPLE: TRAPEZIAL NECK
LOCATION SIMPLE: LEFT ZYGOMA
LOCATION SIMPLE: RIGHT UPPER BACK

## 2019-09-17 ASSESSMENT — LOCATION DETAILED DESCRIPTION DERM
LOCATION DETAILED: LEFT SUPERIOR UPPER BACK
LOCATION DETAILED: RIGHT LATERAL DISTAL PRETIBIAL REGION
LOCATION DETAILED: LEFT CENTRAL ZYGOMA
LOCATION DETAILED: LEFT CENTRAL FRONTAL SCALP
LOCATION DETAILED: LEFT LATERAL DISTAL PRETIBIAL REGION
LOCATION DETAILED: MID TRAPEZIAL NECK
LOCATION DETAILED: RIGHT MID PREAURICULAR CHEEK
LOCATION DETAILED: LEFT SUPERIOR LATERAL MALAR CHEEK
LOCATION DETAILED: RIGHT INFERIOR ANTERIOR NECK
LOCATION DETAILED: LEFT ANTERIOR DISTAL THIGH
LOCATION DETAILED: RIGHT INFERIOR UPPER BACK
LOCATION DETAILED: RIGHT POSTERIOR SHOULDER
LOCATION DETAILED: LEFT DISTAL POSTERIOR THIGH
LOCATION DETAILED: PERIUMBILICAL SKIN
LOCATION DETAILED: RIGHT INFERIOR MEDIAL MIDBACK
LOCATION DETAILED: RIGHT MEDIAL SUPERIOR CHEST

## 2019-09-17 ASSESSMENT — LOCATION ZONE DERM
LOCATION ZONE: LEG
LOCATION ZONE: SCALP
LOCATION ZONE: FACE
LOCATION ZONE: NECK
LOCATION ZONE: ARM
LOCATION ZONE: TRUNK

## 2019-09-17 NOTE — PROCEDURE: LIQUID NITROGEN
Consent: The patient's consent was obtained including but not limited to risks of crusting, scabbing, blistering, scarring, darker or lighter pigmentary change, recurrence, incomplete removal and infection.
Post-Care Instructions: I reviewed with the patient in detail post-care instructions. Patient is to wear sunprotection, and avoid picking at any of the treated lesions. Pt may apply Vaseline to crusted or scabbing areas.
Detail Level: Simple
Duration Of Freeze Thaw-Cycle (Seconds): 4
Number Of Freeze-Thaw Cycles: 1 freeze-thaw cycle
Render Note In Bullet Format When Appropriate: No

## 2019-09-17 NOTE — PROCEDURE: OTHER
Detail Level: Simple
Other (Free Text): Scent free moisturizer, short shower, scent free products
Note Text (......Xxx Chief Complaint.): This diagnosis correlates with the

## 2020-01-27 PROBLEM — B37.0 THRUSH: Status: RESOLVED | Noted: 2018-08-29 | Resolved: 2020-01-27

## 2020-03-30 NOTE — PROCEDURES
Esophagogastroduodenoscopy    DATE of PROCEDURE: 5/21/2018    MEDICATIONS ADMINISTERED: MAC    INSTRUMENT: GIF    PROCEDURE:  After obtaining informed consent, the patient was placed in the left lateral position and sedated. The endoscope was advanced under direct vision without difficulty. The esophagus, stomach (including retroflexed views) and duodenum were evaluated. The patient was taken to the recovery area in stable condition. FINDINGS:  ESOPHAGUS:  2cm hiatal hernia without esophagitis or tear  STOMACH:  There is a clean based linear ulcer in the antrum that likely caused bleeding. No active bleeding, or visible vessel noted. Single biopsy taken from the antrum and the body for h pylori  DUODENUM: normal without ulcers    Estimated blood loss: 0-minimal     PLAN:  1. Fup pathology  2. Hold plavix an additional day and then can resume if needed for cardiac history  3. Continue PPI twice daily  4.  Regular diet today but needs one unit of blood for hb 7.6 in setting of CAD    Philippe Clayton MD  [facility], [address]

## 2020-09-10 ENCOUNTER — HOSPITAL ENCOUNTER (OUTPATIENT)
Dept: GENERAL RADIOLOGY | Age: 80
Discharge: HOME | End: 2020-09-10
Payer: MEDICARE

## 2020-09-10 DIAGNOSIS — J90 PLEURAL EFFUSION: ICD-10-CM

## 2020-09-10 DIAGNOSIS — J96.90 RESPIRATORY FAILURE, UNSPECIFIED CHRONICITY, UNSPECIFIED WHETHER WITH HYPOXIA OR HYPERCAPNIA (HCC): ICD-10-CM

## 2020-09-10 PROCEDURE — 71046 X-RAY EXAM CHEST 2 VIEWS: CPT

## 2020-12-08 ENCOUNTER — APPOINTMENT (RX ONLY)
Dept: URBAN - METROPOLITAN AREA CLINIC 23 | Facility: CLINIC | Age: 80
Setting detail: DERMATOLOGY
End: 2020-12-08

## 2020-12-08 DIAGNOSIS — L663 OTHER SPECIFIED DISEASES OF HAIR AND HAIR FOLLICLES: ICD-10-CM

## 2020-12-08 DIAGNOSIS — Z71.89 OTHER SPECIFIED COUNSELING: ICD-10-CM

## 2020-12-08 DIAGNOSIS — L73.9 FOLLICULAR DISORDER, UNSPECIFIED: ICD-10-CM

## 2020-12-08 DIAGNOSIS — L81.4 OTHER MELANIN HYPERPIGMENTATION: ICD-10-CM

## 2020-12-08 DIAGNOSIS — L82.1 OTHER SEBORRHEIC KERATOSIS: ICD-10-CM

## 2020-12-08 DIAGNOSIS — D22 MELANOCYTIC NEVI: ICD-10-CM

## 2020-12-08 DIAGNOSIS — L738 OTHER SPECIFIED DISEASES OF HAIR AND HAIR FOLLICLES: ICD-10-CM

## 2020-12-08 DIAGNOSIS — D18.0 HEMANGIOMA: ICD-10-CM

## 2020-12-08 PROBLEM — D18.01 HEMANGIOMA OF SKIN AND SUBCUTANEOUS TISSUE: Status: ACTIVE | Noted: 2020-12-08

## 2020-12-08 PROBLEM — L02.821 FURUNCLE OF HEAD [ANY PART, EXCEPT FACE]: Status: ACTIVE | Noted: 2020-12-08

## 2020-12-08 PROBLEM — D22.5 MELANOCYTIC NEVI OF TRUNK: Status: ACTIVE | Noted: 2020-12-08

## 2020-12-08 PROCEDURE — ? COUNSELING

## 2020-12-08 PROCEDURE — 99214 OFFICE O/P EST MOD 30 MIN: CPT

## 2020-12-08 ASSESSMENT — LOCATION ZONE DERM
LOCATION ZONE: TRUNK
LOCATION ZONE: ARM
LOCATION ZONE: SCALP
LOCATION ZONE: NECK
LOCATION ZONE: LEG
LOCATION ZONE: FACE

## 2020-12-08 ASSESSMENT — LOCATION DETAILED DESCRIPTION DERM
LOCATION DETAILED: RIGHT INFERIOR MEDIAL MIDBACK
LOCATION DETAILED: LEFT ANTERIOR DISTAL THIGH
LOCATION DETAILED: RIGHT INFERIOR ANTERIOR NECK
LOCATION DETAILED: PERIUMBILICAL SKIN
LOCATION DETAILED: RIGHT INFERIOR UPPER BACK
LOCATION DETAILED: MID TRAPEZIAL NECK
LOCATION DETAILED: RIGHT MID PREAURICULAR CHEEK
LOCATION DETAILED: LEFT CENTRAL FRONTAL SCALP
LOCATION DETAILED: LEFT CENTRAL ZYGOMA
LOCATION DETAILED: RIGHT POSTERIOR SHOULDER
LOCATION DETAILED: RIGHT MEDIAL SUPERIOR CHEST
LOCATION DETAILED: LEFT SUPERIOR PARIETAL SCALP
LOCATION DETAILED: LEFT DISTAL POSTERIOR THIGH
LOCATION DETAILED: LEFT SUPERIOR UPPER BACK

## 2020-12-08 ASSESSMENT — LOCATION SIMPLE DESCRIPTION DERM
LOCATION SIMPLE: RIGHT UPPER BACK
LOCATION SIMPLE: LEFT ZYGOMA
LOCATION SIMPLE: LEFT UPPER BACK
LOCATION SIMPLE: CHEST
LOCATION SIMPLE: RIGHT CHEEK
LOCATION SIMPLE: LEFT POSTERIOR THIGH
LOCATION SIMPLE: RIGHT ANTERIOR NECK
LOCATION SIMPLE: LEFT SCALP
LOCATION SIMPLE: TRAPEZIAL NECK
LOCATION SIMPLE: LEFT THIGH
LOCATION SIMPLE: SCALP
LOCATION SIMPLE: ABDOMEN
LOCATION SIMPLE: RIGHT SHOULDER
LOCATION SIMPLE: RIGHT LOWER BACK

## 2020-12-22 ENCOUNTER — ANESTHESIA EVENT (OUTPATIENT)
Dept: ENDOSCOPY | Age: 80
End: 2020-12-22
Payer: MEDICARE

## 2020-12-22 NOTE — PROGRESS NOTES
Confirmed scheduled procedure with patient. Informed patient of time of arrival, entry location, and 1 visitor policy. Patient verbalized understanding. Opportunity for questions provided. No concerns at this time.

## 2020-12-23 ENCOUNTER — ANESTHESIA (OUTPATIENT)
Dept: ENDOSCOPY | Age: 80
End: 2020-12-23
Payer: MEDICARE

## 2020-12-23 ENCOUNTER — HOSPITAL ENCOUNTER (OUTPATIENT)
Age: 80
Setting detail: OUTPATIENT SURGERY
Discharge: HOME | End: 2020-12-23
Attending: INTERNAL MEDICINE | Admitting: INTERNAL MEDICINE
Payer: MEDICARE

## 2020-12-23 VITALS
TEMPERATURE: 98 F | HEART RATE: 53 BPM | HEIGHT: 68 IN | DIASTOLIC BLOOD PRESSURE: 59 MMHG | BODY MASS INDEX: 25.46 KG/M2 | WEIGHT: 168 LBS | OXYGEN SATURATION: 98 % | SYSTOLIC BLOOD PRESSURE: 145 MMHG | RESPIRATION RATE: 16 BRPM

## 2020-12-23 PROCEDURE — 2709999900 HC NON-CHARGEABLE SUPPLY: Performed by: INTERNAL MEDICINE

## 2020-12-23 PROCEDURE — 74011250636 HC RX REV CODE- 250/636: Performed by: NURSE ANESTHETIST, CERTIFIED REGISTERED

## 2020-12-23 PROCEDURE — 76060000031 HC ANESTHESIA FIRST 0.5 HR: Performed by: INTERNAL MEDICINE

## 2020-12-23 PROCEDURE — 74011250636 HC RX REV CODE- 250/636: Performed by: ANESTHESIOLOGY

## 2020-12-23 PROCEDURE — 76040000025: Performed by: INTERNAL MEDICINE

## 2020-12-23 RX ORDER — PROPOFOL 10 MG/ML
INJECTION, EMULSION INTRAVENOUS AS NEEDED
Status: DISCONTINUED | OUTPATIENT
Start: 2020-12-23 | End: 2020-12-23 | Stop reason: HOSPADM

## 2020-12-23 RX ORDER — PROPOFOL 10 MG/ML
INJECTION, EMULSION INTRAVENOUS
Status: DISCONTINUED | OUTPATIENT
Start: 2020-12-23 | End: 2020-12-23 | Stop reason: HOSPADM

## 2020-12-23 RX ORDER — SODIUM CHLORIDE, SODIUM LACTATE, POTASSIUM CHLORIDE, CALCIUM CHLORIDE 600; 310; 30; 20 MG/100ML; MG/100ML; MG/100ML; MG/100ML
100 INJECTION, SOLUTION INTRAVENOUS CONTINUOUS
Status: DISCONTINUED | OUTPATIENT
Start: 2020-12-23 | End: 2020-12-23 | Stop reason: HOSPADM

## 2020-12-23 RX ADMIN — SODIUM CHLORIDE, SODIUM LACTATE, POTASSIUM CHLORIDE, AND CALCIUM CHLORIDE 100 ML/HR: 600; 310; 30; 20 INJECTION, SOLUTION INTRAVENOUS at 10:19

## 2020-12-23 RX ADMIN — PROPOFOL 140 MCG/KG/MIN: 10 INJECTION, EMULSION INTRAVENOUS at 10:53

## 2020-12-23 RX ADMIN — PROPOFOL 50 MG: 10 INJECTION, EMULSION INTRAVENOUS at 10:53

## 2020-12-23 NOTE — ANESTHESIA POSTPROCEDURE EVALUATION
Procedure(s):  ESOPHAGOGASTRODUODENOSCOPY (EGD)/BMI 27. total IV anesthesia    Anesthesia Post Evaluation        Patient location during evaluation: PACU  Patient participation: complete - patient participated  Level of consciousness: awake  Pain management: satisfactory to patient  Airway patency: patent  Anesthetic complications: no  Cardiovascular status: hemodynamically stable  Respiratory status: spontaneous ventilation  Hydration status: euvolemic  Post anesthesia nausea and vomiting:  none      INITIAL Post-op Vital signs:   Vitals Value Taken Time   /59 12/23/20 1133   Temp 36.7 °C (98 °F) 12/23/20 1104   Pulse 55 12/23/20 1139   Resp 14 12/23/20 1104   SpO2 98 % 12/23/20 1139   Vitals shown include unvalidated device data.

## 2020-12-23 NOTE — H&P
PreProcedure H&P Update    Today's Date:  12/23/2020    CC:  Hx gastric ulcers    Subjective:   HPI: Hx gastric ulcers    PMH:  Past Medical History:   Diagnosis Date    Acute respiratory failure ([facility] 08/29/2018    LLL pneumonia with bilateral pleural effusions- admission [facility]    Allergic rhinitis     Anemia     Aortic insufficiency     Aortic sclerosis     no stenosis- ECHO 9/8/14  LVEF 50-55%    BPH with urinary obstruction     resolved with surgery    CAD (coronary artery disease)     stent X1 in 2010. .managed with medications    Chronic pain     right shoulder    Colon polyps     Depression     Diverticulitis     Dyslipidemia     managed with medications    Dyslipidemia     Former pipe smoker     stopped at age 30    GERD (gastroesophageal reflux disease)     managed with PRN medications    GI bleed     from Plavix    Heart murmur     History of depression     Hypercholesterolemia     managed with meds    Hypertension     controlled w/med    IBS (irritable bowel syndrome)     manged with medications    Ill-defined condition     pericarditis    Macular degeneration     managed with medications    Macular degeneration     [facility]2015    Old MI (myocardial infarction)     x2; last 9/2014    Osteoarthritis     in shoulders and back    Pleural effusion     PUD (peptic ulcer disease) 3/2011    no recent episodes    PUD (peptic ulcer disease)     Restless leg syndrome     manged with medications    Seasonal allergic rhinitis     PRN medications    Unspecified sleep apnea     wears cpap       Medications:   Current Facility-Administered Medications   Medication Dose Route Frequency    lactated Ringers infusion  100 mL/hr IntraVENous CONTINUOUS    lactated Ringers infusion  100 mL/hr IntraVENous CONTINUOUS         Objective:   Vitals:  Visit Vitals  BP (!) 175/74   Pulse 88   Temp 98.4 °F (36.9 °C)   Resp 18   Ht 5' 8\" (1.727 m)   Wt 76.2 kg (168 lb)   SpO2 98%   BMI 25.54 kg/m²     Exam:  General appearance: alert, cooperative, no distress  Lungs: clear to auscultation bilaterally anteriorly  Heart: regular rate and rhythm  Abdomen: soft, non-tender. Bowel sounds normal. No masses, no organomegaly      Data Review (Labs):    No results for input(s): WBC, HGB, HCT, PLT, MCV, NA, K, CL, CO2, BUN, CREA, CA, GLU, AP, TBIL, CBIL, ALB, TP, AML, LPSE, PTP, INR, APTT, HGBEXT, HCTEXT, PLTEXT, INREXT in the last 72 hours. No lab exists for component: SGOT, GPT, DBIL    Plan:     Hx gastric ulcers.   Proceed with EGD on Plavix, aspirin and Protonix

## 2020-12-23 NOTE — PROCEDURES
Esophagogastroduodenoscopy    DATE of PROCEDURE: 12/23/2020    INDICATION: history of gastric ulcers    POSTPROCEDURE DIAGNOSIS: gastric ulcers    MEDICATIONS ADMINISTERED: MAC anesthesia (see anesthesia report)    INSTRUMENT:    PROCEDURE:  After obtaining informed consent, the patient was placed in the left lateral position and sedated. The endoscope was advanced under direct vision without difficulty. The esophagus, stomach (including retroflexed views) and duodenum were evaluated. The patient was taken to the recovery area in stable condition. FINDINGS:  ESOPHAGUS: normal  STOMACH: immediate pre pyloric region with three benign appearing fibrin based ulcers (< = 1 cm in size)  DUODENUM: normal    Estimated blood loss: 0-minimal   Specimens obtained during procedure: none    PLAN: Unfortunately, still with ulcers on Protonix. Since he doesn't want PPI BID, will add Carafate BID. OV 3 mo with CBC check 1-2 weeks prior.

## 2020-12-23 NOTE — DISCHARGE INSTRUCTIONS
Gastrointestinal Esophagogastroduodenoscopy (EGD) - Upper Exam Discharge Instructions    1. Call Dr. Melissa Choi at [phone] for any problems or questions. 2. Contact the doctor's office for follow up appointment as directed. 3. Medication may cause drowsiness for several hours, therefore:  · Do not drive or operate machinery for remainder of the day. · No alcohol today. · Do not make any important or legal decisions for 24 hours. · Do not sign any legal documents for 24 hours. 5. Ordinarily, you may resume regular diet and activity after exam unless otherwise              specified by your physician. 6. For mild soreness in your throat you may use Cepacol throat lozenges or warm               salt-water gargles as needed.

## 2020-12-23 NOTE — ANESTHESIA PREPROCEDURE EVALUATION
Anesthetic History   No history of anesthetic complications            Review of Systems / Medical History  Patient summary reviewed and pertinent labs reviewed    Pulmonary        Sleep apnea: CPAP           Neuro/Psych   Within defined limits           Cardiovascular    Hypertension: well controlled  Valvular problems/murmurs (mod-severe AI): aortic insufficiency    CHF (EF 50%)    Past MI (2014), CAD and cardiac stents (stent X1 in 2010)    Exercise tolerance: >4 METS  Comments: Cath 6/20:  1-PATENT CYPHER STENT MID LAD. MILD TO MODERATE LAD DISEASE. RFR=.92=NON   OBSTRUCTIVE  2-LIKELY RECENT TOTAL PROXIMAL OCCLUSION OF THE CIRCUMFLEX  3-MILD LATERAL WALL HYPOKINESIS, EF 50%   4-SUCCESSFUL OPENING OF THE CIRCUMFLEX BUT AFTER INTERVENTION IT IS   APPARENT THAT THIS WAS A VERY SMALL CALIBER VESSEL.     GI/Hepatic/Renal     GERD: well controlled          Comments: GIB requiring blood transfusion Endo/Other        Arthritis     Other Findings            Physical Exam    Airway  Mallampati: II  TM Distance: 4 - 6 cm  Neck ROM: normal range of motion   Mouth opening: Normal     Cardiovascular  Regular rate and rhythm,  S1 and S2 normal,  no murmur, click, rub, or gallop  Rhythm: regular  Rate: normal      Pertinent negatives: No murmur   Dental    Dentition: Poor dentition     Pulmonary  Breath sounds clear to auscultation               Abdominal  GI exam deferred       Other Findings            Anesthetic Plan    ASA: 3  Anesthesia type: total IV anesthesia          Induction: Intravenous  Anesthetic plan and risks discussed with: Patient

## 2021-01-15 NOTE — PROGRESS NOTES
Called pt to reschedule appt, left voicemail for pt to call us back  Report given to oncoming nurse, pt stable.

## 2021-03-23 ENCOUNTER — TRANSCRIBE ORDER (OUTPATIENT)
Dept: SCHEDULING | Age: 81
End: 2021-03-23

## 2021-03-23 DIAGNOSIS — R10.32 LEFT LOWER QUADRANT PAIN: Primary | ICD-10-CM

## 2021-03-23 DIAGNOSIS — Z87.11 HISTORY OF GASTRIC ULCER: ICD-10-CM

## 2021-03-26 ENCOUNTER — HOSPITAL ENCOUNTER (OUTPATIENT)
Dept: CT IMAGING | Age: 81
Discharge: HOME | End: 2021-03-26
Attending: INTERNAL MEDICINE
Payer: MEDICARE

## 2021-03-26 DIAGNOSIS — Z87.11 HISTORY OF GASTRIC ULCER: ICD-10-CM

## 2021-03-26 DIAGNOSIS — R10.32 LEFT LOWER QUADRANT PAIN: ICD-10-CM

## 2021-03-26 LAB — CREAT BLD-MCNC: 0.79 MG/DL (ref 0.8–1.5)

## 2021-03-26 PROCEDURE — 74011000258 HC RX REV CODE- 258: Performed by: INTERNAL MEDICINE

## 2021-03-26 PROCEDURE — 82565 ASSAY OF CREATININE: CPT

## 2021-03-26 PROCEDURE — 74177 CT ABD & PELVIS W/CONTRAST: CPT

## 2021-03-26 PROCEDURE — 74011000636 HC RX REV CODE- 636: Performed by: INTERNAL MEDICINE

## 2021-03-26 RX ORDER — SODIUM CHLORIDE 0.9 % (FLUSH) 0.9 %
10 SYRINGE (ML) INJECTION
Status: COMPLETED | OUTPATIENT
Start: 2021-03-26 | End: 2021-03-26

## 2021-03-26 RX ADMIN — SODIUM CHLORIDE 100 ML: 900 INJECTION, SOLUTION INTRAVENOUS at 15:18

## 2021-03-26 RX ADMIN — DIATRIZOATE MEGLUMINE AND DIATRIZOATE SODIUM 15 ML: 660; 100 LIQUID ORAL; RECTAL at 15:18

## 2021-03-26 RX ADMIN — Medication 10 ML: at 15:18

## 2021-03-26 RX ADMIN — IOPAMIDOL 100 ML: 755 INJECTION, SOLUTION INTRAVENOUS at 15:18

## 2021-12-07 ENCOUNTER — APPOINTMENT (RX ONLY)
Dept: URBAN - METROPOLITAN AREA CLINIC 23 | Facility: CLINIC | Age: 81
Setting detail: DERMATOLOGY
End: 2021-12-07

## 2021-12-07 DIAGNOSIS — L85.3 XEROSIS CUTIS: ICD-10-CM

## 2021-12-07 DIAGNOSIS — L82.1 OTHER SEBORRHEIC KERATOSIS: ICD-10-CM

## 2021-12-07 DIAGNOSIS — L57.0 ACTINIC KERATOSIS: ICD-10-CM

## 2021-12-07 DIAGNOSIS — Z71.89 OTHER SPECIFIED COUNSELING: ICD-10-CM

## 2021-12-07 DIAGNOSIS — L57.8 OTHER SKIN CHANGES DUE TO CHRONIC EXPOSURE TO NONIONIZING RADIATION: ICD-10-CM

## 2021-12-07 PROCEDURE — ? COUNSELING

## 2021-12-07 PROCEDURE — ? TREATMENT REGIMEN

## 2021-12-07 PROCEDURE — ? PRESCRIPTION

## 2021-12-07 PROCEDURE — 17000 DESTRUCT PREMALG LESION: CPT

## 2021-12-07 PROCEDURE — ? LIQUID NITROGEN

## 2021-12-07 PROCEDURE — 99214 OFFICE O/P EST MOD 30 MIN: CPT | Mod: 25

## 2021-12-07 RX ORDER — TRIAMCINOLONE ACETONIDE 1 MG/G
CREAM TOPICAL BID
Qty: 454 | Refills: 3 | Status: ERX | COMMUNITY
Start: 2021-12-07

## 2021-12-07 RX ADMIN — TRIAMCINOLONE ACETONIDE: 1 CREAM TOPICAL at 00:00

## 2021-12-07 ASSESSMENT — LOCATION ZONE DERM
LOCATION ZONE: LEG
LOCATION ZONE: TRUNK
LOCATION ZONE: ARM
LOCATION ZONE: FACE
LOCATION ZONE: SCALP

## 2021-12-07 ASSESSMENT — LOCATION DETAILED DESCRIPTION DERM
LOCATION DETAILED: LEFT MID-UPPER BACK
LOCATION DETAILED: RIGHT POSTERIOR SHOULDER
LOCATION DETAILED: PERIUMBILICAL SKIN
LOCATION DETAILED: RIGHT SUPERIOR UPPER BACK
LOCATION DETAILED: LEFT SUPERIOR UPPER BACK
LOCATION DETAILED: RIGHT SUPERIOR OCCIPITAL SCALP
LOCATION DETAILED: LEFT CENTRAL ZYGOMA
LOCATION DETAILED: RIGHT CLAVICULAR SKIN
LOCATION DETAILED: LEFT DISTAL POSTERIOR THIGH
LOCATION DETAILED: LEFT ANTERIOR DISTAL THIGH
LOCATION DETAILED: LEFT CENTRAL FRONTAL SCALP
LOCATION DETAILED: RIGHT MID PREAURICULAR CHEEK

## 2021-12-07 ASSESSMENT — LOCATION SIMPLE DESCRIPTION DERM
LOCATION SIMPLE: SCALP
LOCATION SIMPLE: RIGHT SHOULDER
LOCATION SIMPLE: ABDOMEN
LOCATION SIMPLE: RIGHT OCCIPITAL SCALP
LOCATION SIMPLE: LEFT POSTERIOR THIGH
LOCATION SIMPLE: LEFT SCALP
LOCATION SIMPLE: RIGHT CLAVICULAR SKIN
LOCATION SIMPLE: LEFT THIGH
LOCATION SIMPLE: RIGHT UPPER BACK
LOCATION SIMPLE: LEFT UPPER BACK
LOCATION SIMPLE: LEFT ZYGOMA
LOCATION SIMPLE: RIGHT CHEEK

## 2021-12-07 NOTE — PROCEDURE: TREATMENT REGIMEN
Initiate Treatment: Atopic precautions, shorter shower times, TAC cream when itchy
Detail Level: Zone

## 2021-12-07 NOTE — PROCEDURE: LIQUID NITROGEN
Consent: The patient's consent was obtained including but not limited to risks of crusting, scabbing, blistering, scarring, darker or lighter pigmentary change, recurrence, incomplete removal and infection.
Show Aperture Variable?: Yes
Render Post-Care Instructions In Note?: no
Detail Level: Simple
Post-Care Instructions: I reviewed with the patient in detail post-care instructions. Patient is to wear sunprotection, and avoid picking at any of the treated lesions. Pt may apply Vaseline to crusted or scabbing areas.
Duration Of Freeze Thaw-Cycle (Seconds): 5
Number Of Freeze-Thaw Cycles: 1 freeze-thaw cycle

## 2022-02-18 ENCOUNTER — HOSPITAL ENCOUNTER (OUTPATIENT)
Dept: ULTRASOUND IMAGING | Age: 82
Discharge: HOME | End: 2022-02-18
Attending: FAMILY MEDICINE
Payer: MEDICARE

## 2022-02-18 DIAGNOSIS — R13.10 DYSPHAGIA, UNSPECIFIED TYPE: ICD-10-CM

## 2022-02-18 PROCEDURE — 76536 US EXAM OF HEAD AND NECK: CPT

## 2022-03-18 PROBLEM — I31.9 PERICARDITIS WITH EFFUSION: Status: ACTIVE | Noted: 2018-08-29

## 2022-03-18 PROBLEM — J90 PLEURAL EFFUSION, LEFT: Status: ACTIVE | Noted: 2018-08-29

## 2022-03-19 PROBLEM — K27.9 PEPTIC ULCER DISEASE: Status: ACTIVE | Noted: 2018-08-29

## 2022-03-20 PROBLEM — J90 PLEURAL EFFUSION, RIGHT: Status: ACTIVE | Noted: 2018-08-29

## 2022-06-13 RX ORDER — PANTOPRAZOLE SODIUM 40 MG/1
40 TABLET, DELAYED RELEASE ORAL DAILY
Qty: 30 TABLET | Refills: 5 | Status: SHIPPED | OUTPATIENT
Start: 2022-06-13 | End: 2022-06-15 | Stop reason: SDUPTHER

## 2022-06-15 RX ORDER — PANTOPRAZOLE SODIUM 40 MG/1
TABLET, DELAYED RELEASE ORAL
Qty: 180 TABLET | Refills: 3 | Status: SHIPPED | OUTPATIENT
Start: 2022-06-15

## 2022-07-08 ENCOUNTER — OFFICE VISIT (OUTPATIENT)
Dept: FAMILY MEDICINE CLINIC | Facility: CLINIC | Age: 82
End: 2022-07-08
Payer: MEDICARE

## 2022-07-08 VITALS
BODY MASS INDEX: 27.52 KG/M2 | SYSTOLIC BLOOD PRESSURE: 138 MMHG | WEIGHT: 181.6 LBS | DIASTOLIC BLOOD PRESSURE: 64 MMHG | HEIGHT: 68 IN

## 2022-07-08 DIAGNOSIS — I10 PRIMARY HYPERTENSION: ICD-10-CM

## 2022-07-08 DIAGNOSIS — S69.91XA INJURY OF FINGER OF RIGHT HAND, INITIAL ENCOUNTER: Primary | ICD-10-CM

## 2022-07-08 PROCEDURE — G8417 CALC BMI ABV UP PARAM F/U: HCPCS | Performed by: FAMILY MEDICINE

## 2022-07-08 PROCEDURE — G8428 CUR MEDS NOT DOCUMENT: HCPCS | Performed by: FAMILY MEDICINE

## 2022-07-08 PROCEDURE — 99213 OFFICE O/P EST LOW 20 MIN: CPT | Performed by: FAMILY MEDICINE

## 2022-07-08 PROCEDURE — 1123F ACP DISCUSS/DSCN MKR DOCD: CPT | Performed by: FAMILY MEDICINE

## 2022-07-08 PROCEDURE — 4004F PT TOBACCO SCREEN RCVD TLK: CPT | Performed by: FAMILY MEDICINE

## 2022-07-08 RX ORDER — AMLODIPINE BESYLATE 5 MG/1
TABLET ORAL
Qty: 90 TABLET | Refills: 1 | Status: SHIPPED | OUTPATIENT
Start: 2022-07-08 | End: 2022-10-14 | Stop reason: SDUPTHER

## 2022-07-08 NOTE — PROGRESS NOTES
SUBJECTIVE:   Graciela Marie is a 80 y.o. male who has a past medical history significant for hypertension, CAD, recurrent pericarditis, history of GI bleed, peptic ulcer disease, BPH, reflux and high cholesterol.     Presents today reporting that about 10 days ago he was getting in his truck and was reaching up to grab the handle on the ceiling to pull himself up. He fell backwards and hyperextended his right fourth finger. He states that he had immediate pain and swelling. This got better but he has now developed ongoing pain that radiates into his hand and forearm along with numbness. He has not lost  strength. The discomfort seems to be worse at night. HPI  See above    Past Medical History, Past Surgical History, Family history, Social History, and Medications were all reviewed with the patient today and updated as necessary.        Current Outpatient Medications   Medication Sig Dispense Refill    pantoprazole (PROTONIX) 40 MG tablet TAKE 1 TABLET BY MOUTH TWICE A  tablet 3    acetaminophen (TYLENOL) 650 MG extended release tablet Take 650 mg by mouth every 6 hours as needed      amLODIPine (NORVASC) 5 MG tablet TAKE 1 TABLET BY MOUTH EVERY DAY      aspirin 81 MG EC tablet Take 81 mg by mouth daily      atorvastatin (LIPITOR) 40 MG tablet TAKE 1 TABLET DAILY      clopidogrel (PLAVIX) 75 MG tablet TAKE 1 TABLET DAILY      guaiFENesin (MUCINEX) 600 MG extended release tablet Take 600 mg by mouth 2 times daily      hydroCHLOROthiazide (HYDRODIURIL) 25 MG tablet Take 25 mg by mouth daily      isosorbide mononitrate (IMDUR) 30 MG extended release tablet TAKE 1 TABLET DAILY FOR CHRONIC STABLE ANGINA PECTORIS      losartan (COZAAR) 100 MG tablet Take 100 mg by mouth      Melatonin 5 MG CAPS Take 5 mg by mouth      metoprolol succinate (TOPROL XL) 100 MG extended release tablet TAKE 1 TABLET ONCE DAILY      nitroGLYCERIN (NITROSTAT) 0.4 MG SL tablet Place 0.4 mg under the tongue  pramipexole (MIRAPEX) 0.5 MG tablet TAKE 1 TABLET NIGHTLY FOR RESTLESS LEGS SYNDROME      sucralfate (CARAFATE) 1 GM tablet DISSOLVE 1 TAB IN 2TBSP WATER TO MAKE SLURRY & DRINK 2 XA DAY. DONT TAKE WITHIN AN HR OF OTHER MEDS      triamcinolone (KENALOG) 0.1 % cream APPLY TWICE DAILY TO ECZEMA UP TO 2 WEEKS/MONTH AS NEEDED. NOT FOR FACE, GROIN, AXILLA       No current facility-administered medications for this visit. Allergies   Allergen Reactions    Amoxicillin-Pot Clavulanate Nausea And Vomiting     Severe abd cramping  Severe abd cramping    Oxycodone Other (See Comments)     Sever headaches, abdominal issues     Patient Active Problem List   Diagnosis    Arthritis of shoulder region, right, degenerative    Pericarditis with effusion    Pleural effusion, left    Coronary artery disease involving native coronary artery of native heart without angina pectoris    IBS (irritable bowel syndrome)    Allergic rhinitis    BPH (benign prostatic hyperplasia)    Hypercholesterolemia    Restless leg syndrome    Peptic ulcer disease    BEATRIS (obstructive sleep apnea)    Pleural effusion, right    Hypertension     Past Medical History:   Diagnosis Date    Acute respiratory failure ([facility] [address] 08/29/2018    LLL pneumonia with bilateral pleural effusions- admission [facility]    Allergic rhinitis     Anemia     Aortic insufficiency     Aortic sclerosis     no stenosis- ECHO 9/8/14  LVEF 50-55%    BPH with urinary obstruction     resolved with surgery    CAD (coronary artery disease)     stent X1 in 2010. .managed with medications    Chronic pain     right shoulder    Colon polyps     Depression     Diverticulitis     Dyslipidemia     Dyslipidemia     managed with medications    Former pipe smoker     stopped at age 30    GERD (gastroesophageal reflux disease)     managed with PRN medications    GI bleed     from Plavix    Heart murmur     History of depression     Hypercholesterolemia managed with meds    Hypertension     controlled w/med    IBS (irritable bowel syndrome)     manged with medications    Ill-defined condition     pericarditis    Macular degeneration     managed with medications    Macular degeneration     [facility]    Old MI (myocardial infarction)     x2; last 2014    Osteoarthritis     in shoulders and back    Pleural effusion     PUD (peptic ulcer disease) 3/2011    no recent episodes    PUD (peptic ulcer disease)     Restless leg syndrome     manged with medications    Seasonal allergic rhinitis     PRN medications    Unspecified sleep apnea     wears cpap     Past Surgical History:   Procedure Laterality Date    ADENOIDECTOMY      CARDIAC CATHETERIZATION  2014    no stent required    CARDIAC CATHETERIZATION  , 3/2011,2011    (1)stent 2010     CATARACT REMOVAL Bilateral     COLONOSCOPY      SHOULDER ARTHROSCOPY Right  approx    SHOULDER ARTHROSCOPY Left     TONSILLECTOMY AND ADENOIDECTOMY      as a child    TOTAL SHOULDER ARTHROPLASTY Right     TRANSURETHRAL RESECTION OF PROSTATE  05/15; 07/15     Family History   Problem Relation Age of Onset    High Cholesterol Mother     Heart Disease Mother     Hypertension Brother     Heart Disease Brother     Cancer Father         lung cancer--    Coronary Art Dis Mother     Hypertension Mother      Social History     Tobacco Use    Smoking status: Former Smoker     Packs/day: 0.00     Quit date: 1960     Years since quittin.5    Smokeless tobacco: Never Used    Tobacco comment: Quit smoking: quit smoking pipe --- no cigs   Substance Use Topics    Alcohol use: No         Review of Systems  See above    OBJECTIVE:  /64   Ht 5' 8\" (1.727 m)   Wt 181 lb 9.6 oz (82.4 kg)   BMI 27.61 kg/m²      Physical Exam  Constitutional:       General: He is not in acute distress.      Appearance: Normal appearance. He is not ill-appearing. HENT:      Head: Normocephalic and atraumatic. Cardiovascular:      Rate and Rhythm: Normal rate and regular rhythm. Heart sounds: Normal heart sounds. No murmur heard. Pulmonary:      Effort: Pulmonary effort is normal.      Breath sounds: Normal breath sounds. No wheezing or rhonchi. Musculoskeletal:         General: Normal range of motion. Cervical back: Normal range of motion and neck supple. Right lower leg: No edema. Left lower leg: No edema. Comments: Examination of the patient's right hand reveals no gross deformity or swelling. There is no loss of  strength. Attention to the right fourth finger reveals no evidence of any tendon injury. Good radial and ulnar pulses are noted. Skin:     General: Skin is warm. Findings: No rash. Neurological:      Mental Status: He is alert and oriented to person, place, and time. Psychiatric:         Mood and Affect: Mood normal.         Behavior: Behavior normal.         Thought Content: Thought content normal.         Judgment: Judgment normal.         Medical problems and test results were reviewed with the patient today. ASSESSMENT and PLAN    1. Right fourth finger pain/injury. Numbness and tingling described in the distribution of the ulnar nerve. Refer to orthopedics. In the interim I have immobilized in a soft splint. Treat with Voltaren gel. If symptoms worsen he is to let me know. 2.  Hypertension. /64. Continue current therapy. Elements of this note have been dictated using speech recognition software. As a result, errors of speech recognition may have occurred.

## 2022-07-12 ENCOUNTER — OFFICE VISIT (OUTPATIENT)
Dept: ORTHOPEDIC SURGERY | Age: 82
End: 2022-07-12
Payer: MEDICARE

## 2022-07-12 DIAGNOSIS — M79.641 RIGHT HAND PAIN: Primary | ICD-10-CM

## 2022-07-12 DIAGNOSIS — G56.01 RIGHT CARPAL TUNNEL SYNDROME: ICD-10-CM

## 2022-07-12 PROCEDURE — G8417 CALC BMI ABV UP PARAM F/U: HCPCS | Performed by: NURSE PRACTITIONER

## 2022-07-12 PROCEDURE — G8427 DOCREV CUR MEDS BY ELIG CLIN: HCPCS | Performed by: NURSE PRACTITIONER

## 2022-07-12 PROCEDURE — 1123F ACP DISCUSS/DSCN MKR DOCD: CPT | Performed by: NURSE PRACTITIONER

## 2022-07-12 PROCEDURE — 99204 OFFICE O/P NEW MOD 45 MIN: CPT | Performed by: NURSE PRACTITIONER

## 2022-07-12 PROCEDURE — 1036F TOBACCO NON-USER: CPT | Performed by: NURSE PRACTITIONER

## 2022-07-12 RX ORDER — METHYLPREDNISOLONE 4 MG/1
TABLET ORAL
Qty: 1 KIT | Refills: 0 | Status: SHIPPED | OUTPATIENT
Start: 2022-07-12 | End: 2022-10-21

## 2022-07-12 NOTE — LETTER
DME Patient Authorization Form    Name: Lisa Westfall  : 1940  Age: 80 y.o. Gender: male  Delivery Address: [facility]     Diagnosis:   1. Right hand pain         Requested DME:  wrist lacer x1 right         Clinical Notes:     **Indicates non-covered items by insurance. Payment expected on date of service. Electronically signed by  Provider __Silvana Kapadia, JESSA______________________     Date: _2022_____________________                             [facility] Tax ID # [id number]        Durable Medical Equipment and/or Orthotics Patient Consent     I understand that my physician has prescribed this medical supply as part of my treatment plan as a matter of Medical Necessity.  I understand that I have a choice in where I receive my prescribed orthopedic supplies and/or services.  I authorize [facility] to furnish this service/product and to provide my insurance carrier with any information requested in order to process for payment.  I instruct my insurance carrier to pay [facility] directly for these services/products.  I understand that my insurance carrier may deny payment for this supply because it is a non-covered item, deemed not medically necessary or considered experimental.   I understand that any cost not covered by my insurance carrier will be solely my financial responsibility.  I have received the Supplier Standards and have reviewed them.  I have received the prescribed item and have been fully instructed on the proper use of the above services/products.    ______ (Patient Initials) I understand that all DME items are non-returnable after being dispensed. Items still in sealed packaging may be returned up to 14 days after purchasing.  [address] will replace items that are defective.    ______ (Patient Initials) I understand that [facility] [place] will not file a claim with my insurance carrier for this service/product and I am waiving my right to file a claim on my own for this service/product with my insurance company as this item is NON-COVERED (Denoted by the **) by my Insurance company/policy. ______ (Patient Initials) I understand that I am responsible to bring my equipment to the hospital for any surgery. ______________________________________________  ________________________  Patient / Jessee Jorge            Thank you for considering [address] Your physician has prescribed specific medical equipment or devices for your home use. The following describes your rights and responsibilities as our customer. Right to Choose Providers: You have a choice regarding which company supplies your home medical equipment and devices, and to consult your physician in this decision. You may choose a medical supply store, a home medical equipment provider, or a specialist such as [facility]. [facility] will coordinate with your physician to provide the medical equipment or devices prescribed for your home use. Right to Service:  You have the right to considerate, respectful and nondiscriminatory care. You have the right to receive accurate and easily understood information about your health care. If you speak a foreign language, or don't understand the discussions, assistance will be provided to allow you to make informed health care decisions. You have the right to know your treatment options and to participate in decisions about your care, including the right to accept or refuse treatment. You have the right to expect a reasonable response to your requests for treatment or service.   You have the right to talk in confidence with health care providers and to have your health care information protected. You have the right to receive an explanation of your bill. You have the right to complain about the service or product you receive. Patient Responsibilities:  Please provide complete and accurate information about your health insurance benefits and make arrangements for the timely payment of your bill. [facility] will, if possible, assume responsibility for billing your insurance (Medicare, Medicaid and commercial) for the prescribed equipment or devices. If your policy does not cover the prescribed product, or only covers a portion of the bill, you are responsible for any remaining balance. Return and Exchange Policy:  [facility] will honor published  Warranties for products. [facility] will accept returns or exchanges within 14 days from the date of receipt, providin) the product must be in new condition; 2) receipt as required; and 3) used disposable and hygiene products may only be returned due to a defective product. Note: Refunds will be issued in a timely manner, please allow 4-6 weeks for processing. Complaint Procedures and DME Consumer Protection Resources:  [facility] values you as a customer, and is committed to resolving patient concerns. This commitment includes understanding and documenting your concerns, conducting a review of internal procedures, and providing you with an explanation and resolution to your concerns. Should you have any questions about our services or billing process, please contact our office at (practice phone number). If we are unable to resolve the concern, you have the right to direct comments to the office of Consumer Protection, in the [address] or the [facility] office, without fear of repercussion. DMEPOS SUPPLIER STANDARDS    A supplier must be in compliance with all applicable Federal and [employer] and regulatory requirements.   A supplier must provide complete and accurate information on the DMEPOS supplier application. Any changes to this information must be reported to the [facility] within 30 days. An authorized individual (one whose signature is binding) must sign the application for billing privileges. A supplier must fill orders from its own inventory, or must contract with other companies for the purchase of items necessary to fill the order. A supplier may not contract with any entity that is currently excluded from the Medicare program, any [facility] program, or from any other Federal procurement or Nonprocurement programs. A supplier must advise beneficiaries that they may rent or purchase inexpensive or routinely purchased durable medical equipment, and of the purchase option for capped rental equipment. A supplier must notify beneficiaries of warranty coverage and honor all warranties under applicable State Law, and repair or replace free of charge Medicare covered items that are under warranty. A supplier must maintain a physical facility on an appropriate site. A supplier must permit CMS, or its agents to conduct on-site inspections to ascertain the supplier's compliance with these standards. The supplier location must be accessible to beneficiaries during reasonable business hours, and must maintain a visible sign and posted hours of operation. A supplier must maintain a primary business telephone listed under the name of the business in a [employer] or a toll free number available through directory assistance. The exclusive use of a beeper, answering machine or cell phone is prohibited. A supplier must have comprehensive liability insurance in the amount of at least $300,000 that covers both the supplier's place of business and all customers and employees of the supplier. If the supplier manufactures its own items, this insurance must also cover product liability and completed operations.   A supplier must agree not to initiate telephone contact with beneficiaries, with a few exceptions allowed. This standard prohibits suppliers from calling beneficiaries in order to solicit new business. A supplier is responsible for delivery and must instruct beneficiaries on use of Medicare covered items, and maintain proof of delivery. A supplier must answer questions, and respond to complaints of the beneficiaries, and maintain documentation of such contacts. A supplier must maintain and replace at no charge or repair directly, or through a service contract with another company, Medicare covered items it has rented to beneficiaries. A supplier must accept returns of substandard (less than full quality for the particular item) or unsuitable items (inappropriate for the beneficiary at the time it was fitted and rented or sold) from beneficiaries. A supplier must disclose these supplier standards to each beneficiary to whom it supplies a Medicare-covered item. A supplier must disclose to the government any person having ownership, financial, or control interest in the supplier. A supplier must not convey or reassign a supplier number; i.e., the supplier may not sell or allow another entity to use its Medicare billing number. A supplier must have a complaint resolution protocol established to address beneficiary complaints that relate to these standards. A record of these complaints must be maintained at the physical facility. Complaint records must include: the name, address, telephone number and health insurance claim number of the beneficiary, a summary of the complaint, and any action taken to resolve it. A supplier must agree to furnish CMS any information required by the Medicare statute and implementing regulations. A supplier of DMEPOS and other items and services must be accredited by a CMS-approved accreditation organization in order to receive and retain a supplier billing number.  The accreditation must indicate the specific products and services, for which the supplier is accredited in order for the supplier to receive payment for those specific products and services. A DMEPOS supplier must notify their accreditation organization when a new DMEPOS location is opened. The accreditation organization may accredit the new supplier location for three months after it is operational without requiring a new site visit. All DMEPOS supplier locations, whether owned or subcontracted, must meet the [employer] and be separately accredited in order to bill Medicare. An accredited supplier may be denied enrollment or their enrollment may be revoked, if CMS determines that they are not in compliance with the DMEPOS quality standards. A DMEPOS supplier must disclose upon enrollment all products and services, including the addition of new product lines for which they are seeking accreditation. If a new product line is added after enrollment, the DMEPOS supplier will be responsible for notifying the accrediting body of the new product so that the DMEPOS supplier can be re-surveyed and accredited for these new products. Must meet the surety bond requirements specified in 42 C. F.R. 424.57(c). Implementation date- May 4, 2009. A supplier must obtain oxygen from a state-licensed oxygen supplier. A supplier must maintain ordering and referring documentation consistent with provisions found in 42 C. F.R. 424.516(f). DMEPOS suppliers are prohibited from sharing a practice location with certain other Medicare providers and suppliers. DMEPOS suppliers must remain open to the public for a minimum of 30 hours per week with certain exceptions.

## 2022-07-25 ENCOUNTER — OFFICE VISIT (OUTPATIENT)
Dept: ORTHOPEDIC SURGERY | Age: 82
End: 2022-07-25
Payer: MEDICARE

## 2022-07-25 DIAGNOSIS — M79.641 RIGHT HAND PAIN: Primary | ICD-10-CM

## 2022-07-25 PROCEDURE — 1036F TOBACCO NON-USER: CPT | Performed by: NURSE PRACTITIONER

## 2022-07-25 PROCEDURE — 99213 OFFICE O/P EST LOW 20 MIN: CPT | Performed by: NURSE PRACTITIONER

## 2022-07-25 PROCEDURE — G8417 CALC BMI ABV UP PARAM F/U: HCPCS | Performed by: NURSE PRACTITIONER

## 2022-07-25 PROCEDURE — G8427 DOCREV CUR MEDS BY ELIG CLIN: HCPCS | Performed by: NURSE PRACTITIONER

## 2022-07-25 PROCEDURE — 1123F ACP DISCUSS/DSCN MKR DOCD: CPT | Performed by: NURSE PRACTITIONER

## 2022-08-03 RX ORDER — ISOSORBIDE MONONITRATE 30 MG/1
TABLET, EXTENDED RELEASE ORAL
Qty: 90 TABLET | Refills: 3 | Status: SHIPPED | OUTPATIENT
Start: 2022-08-03

## 2022-08-03 RX ORDER — ATORVASTATIN CALCIUM 40 MG/1
40 TABLET, FILM COATED ORAL DAILY
Qty: 90 TABLET | Refills: 3 | Status: SHIPPED | OUTPATIENT
Start: 2022-08-03

## 2022-08-16 ENCOUNTER — OFFICE VISIT (OUTPATIENT)
Dept: ORTHOPEDIC SURGERY | Age: 82
End: 2022-08-16
Payer: MEDICARE

## 2022-08-16 DIAGNOSIS — G56.01 RIGHT CARPAL TUNNEL SYNDROME: Primary | ICD-10-CM

## 2022-08-16 PROCEDURE — G8427 DOCREV CUR MEDS BY ELIG CLIN: HCPCS | Performed by: NURSE PRACTITIONER

## 2022-08-16 PROCEDURE — G8417 CALC BMI ABV UP PARAM F/U: HCPCS | Performed by: NURSE PRACTITIONER

## 2022-08-16 PROCEDURE — 20526 THER INJECTION CARP TUNNEL: CPT | Performed by: NURSE PRACTITIONER

## 2022-08-16 PROCEDURE — 1123F ACP DISCUSS/DSCN MKR DOCD: CPT | Performed by: NURSE PRACTITIONER

## 2022-08-16 PROCEDURE — 1036F TOBACCO NON-USER: CPT | Performed by: NURSE PRACTITIONER

## 2022-08-16 RX ORDER — BETAMETHASONE SODIUM PHOSPHATE AND BETAMETHASONE ACETATE 3; 3 MG/ML; MG/ML
6 INJECTION, SUSPENSION INTRA-ARTICULAR; INTRALESIONAL; INTRAMUSCULAR; SOFT TISSUE ONCE
Status: COMPLETED | OUTPATIENT
Start: 2022-08-16 | End: 2022-08-16

## 2022-08-16 RX ADMIN — BETAMETHASONE SODIUM PHOSPHATE AND BETAMETHASONE ACETATE 6 MG: 3; 3 INJECTION, SUSPENSION INTRA-ARTICULAR; INTRALESIONAL; INTRAMUSCULAR; SOFT TISSUE at 14:40

## 2022-08-16 NOTE — PROGRESS NOTES
[facility] Note    Name: Greg Dobson  YOB: 1940  Gender: male  MRN: [id number]      Follow up visit:   1. Right carpal tunnel syndrome        HPI: Greg Dobson is a 80 y.o. male who is following up for right hand pain. He is a little over a month from his injury. The last time I saw him he was doing better. He said his symptoms have returned and getting worse. He said his symptoms are worse at night. He said he has pain in the palm of his right hand. There is burning associated with that. He has numbness and tingling in all fingers. ROS/Meds/PSH/PMH/FH/SH: I personally reviewed the patients standard intake form. Pertinents are discussed in the HPI    Physical Examination:    Musculoskeletal Examination:  Examination on the right upper extremity demonstrates cap refill < 5 seconds in all fingers  Examination of the right upper extremity demonstrates Normal sensation to light touch in the median distribution, normal sensation in ulnar and radial distribution, positive carpal tunnel compression testing and Phalen testing, cap refill < 5 seconds in all fingers. Inspection reveals no thenar atrophy. Negative Tinel and elbow flexion compression test of the cubital tunnel, negative Tinel over Guyon's canal. Sensation to light touch in the ulnar 2 digits is normal with no intrinsic atrophy/weakness. No tenderness to palpation or masses noted in the forearm. Imaging / Electrodiagnostic Tests:     none    Assessment:     ICD-10-CM    1. Right carpal tunnel syndrome  G56.01 betamethasone acetate-betamethasone sodium phosphate (CELESTONE) injection 6 mg     Ambulatory referral to Neurology          Plan:   We discussed the diagnosis and different treatment options. We discussed observation, therapy, antiinflammatory medications and other pertinent treatment modalities. After discussing in detail the patient elects to proceed with right carpal tunnel injection.   He will continue to sleep in his wrist brace. We will get him set up for nerve conduction studies. We will reassess his progress back in 2 months. Procedure Note    The risk, benefits and alternatives of injection and no injection therapy were discussed. Risks including skin blanching, subcutaneous fat atrophy, and elevations in blood glucose levels were discussed. The patient consented for an injection. The patient has been identified by name and birthdate. The injection site was identified, marked and prepped with alcohol swabs. Time out completed. The right carpal tunnel was injected with 1ml of 40mg/ml Depomedrol and 1ml Lidocaine plain 1%. The injection site was then dressed with a bandaid. The patient tolerated the injection well. The patient was instructed to monitor their blood sugars if diabetic and call if any concerns. The patient was instructed to call the office if any adverse local effects occurred or any if any questions or concerns arise. Patient voiced accordance and understanding of the information provided and the formulated plan. All questions were answered to the patient's satisfaction during the encounter. 4 This is a chronic illness with exacerbation, progression, or side effect of treatment  Treatment at this time: Minor procedure: cortisone injection. I discussed the risk of skin blanching and hyperglycemia. I discussed the symptoms of hyperglycemia such as confusion, lethargy, polyuria and polydipsia. If any of these symptoms occur the patient is to present to an urgent care facility or primary care doctor for blood sugar evaluation.     ANGELO Rangel - CNP  Orthopaedic Surgery  08/16/22  3:01 PM

## 2022-09-15 ENCOUNTER — NURSE ONLY (OUTPATIENT)
Dept: FAMILY MEDICINE CLINIC | Facility: CLINIC | Age: 82
End: 2022-09-15
Payer: MEDICARE

## 2022-09-15 DIAGNOSIS — E78.00 HYPERCHOLESTEROLEMIA: Primary | ICD-10-CM

## 2022-09-15 DIAGNOSIS — I10 PRIMARY HYPERTENSION: ICD-10-CM

## 2022-09-15 LAB
ALBUMIN SERPL-MCNC: 3.5 G/DL (ref 3.2–4.6)
ALBUMIN/GLOB SERPL: 1.3 {RATIO} (ref 1.2–3.5)
ALP SERPL-CCNC: 85 U/L (ref 50–136)
ALT SERPL-CCNC: 24 U/L (ref 12–65)
ANION GAP SERPL CALC-SCNC: 4 MMOL/L (ref 4–13)
AST SERPL-CCNC: 23 U/L (ref 15–37)
BILIRUB SERPL-MCNC: 1.3 MG/DL (ref 0.2–1.1)
BUN SERPL-MCNC: 12 MG/DL (ref 8–23)
CALCIUM SERPL-MCNC: 9.1 MG/DL (ref 8.3–10.4)
CHLORIDE SERPL-SCNC: 99 MMOL/L (ref 101–110)
CHOLEST SERPL-MCNC: 126 MG/DL
CO2 SERPL-SCNC: 33 MMOL/L (ref 21–32)
CREAT SERPL-MCNC: 0.9 MG/DL (ref 0.8–1.5)
GLOBULIN SER CALC-MCNC: 2.7 G/DL (ref 2.3–3.5)
GLUCOSE SERPL-MCNC: 117 MG/DL (ref 65–100)
HDLC SERPL-MCNC: 36 MG/DL (ref 40–60)
HDLC SERPL: 3.5 {RATIO}
LDLC SERPL CALC-MCNC: 68.4 MG/DL
POTASSIUM SERPL-SCNC: 3.9 MMOL/L (ref 3.5–5.1)
PROT SERPL-MCNC: 6.2 G/DL (ref 6.3–8.2)
SODIUM SERPL-SCNC: 136 MMOL/L (ref 136–145)
TRIGL SERPL-MCNC: 108 MG/DL (ref 35–150)
VLDLC SERPL CALC-MCNC: 21.6 MG/DL (ref 6–23)

## 2022-09-15 PROCEDURE — 36415 COLL VENOUS BLD VENIPUNCTURE: CPT | Performed by: FAMILY MEDICINE

## 2022-09-19 ENCOUNTER — OFFICE VISIT (OUTPATIENT)
Dept: FAMILY MEDICINE CLINIC | Facility: CLINIC | Age: 82
End: 2022-09-19
Payer: MEDICARE

## 2022-09-19 VITALS
DIASTOLIC BLOOD PRESSURE: 60 MMHG | WEIGHT: 179.6 LBS | HEART RATE: 54 BPM | OXYGEN SATURATION: 97 % | HEIGHT: 68 IN | SYSTOLIC BLOOD PRESSURE: 130 MMHG | BODY MASS INDEX: 27.22 KG/M2

## 2022-09-19 DIAGNOSIS — E78.00 PURE HYPERCHOLESTEROLEMIA: Primary | ICD-10-CM

## 2022-09-19 DIAGNOSIS — R06.09 DOE (DYSPNEA ON EXERTION): ICD-10-CM

## 2022-09-19 DIAGNOSIS — I25.10 CORONARY ARTERY DISEASE INVOLVING NATIVE CORONARY ARTERY OF NATIVE HEART WITHOUT ANGINA PECTORIS: ICD-10-CM

## 2022-09-19 DIAGNOSIS — R26.81 UNSTEADINESS: ICD-10-CM

## 2022-09-19 DIAGNOSIS — I10 PRIMARY HYPERTENSION: ICD-10-CM

## 2022-09-19 PROCEDURE — 1123F ACP DISCUSS/DSCN MKR DOCD: CPT | Performed by: FAMILY MEDICINE

## 2022-09-19 PROCEDURE — 99214 OFFICE O/P EST MOD 30 MIN: CPT | Performed by: FAMILY MEDICINE

## 2022-09-19 PROCEDURE — G8417 CALC BMI ABV UP PARAM F/U: HCPCS | Performed by: FAMILY MEDICINE

## 2022-09-19 PROCEDURE — 1036F TOBACCO NON-USER: CPT | Performed by: FAMILY MEDICINE

## 2022-09-19 PROCEDURE — G8427 DOCREV CUR MEDS BY ELIG CLIN: HCPCS | Performed by: FAMILY MEDICINE

## 2022-09-19 NOTE — PROGRESS NOTES
SUBJECTIVE:   Екатерина Peters is a [de-identified] y.o. male who has a past medical history significant for hypertension, CAD, recurrent pericarditis, history of GI bleed, peptic ulcer disease, BPH, reflux and high cholesterol. Review of systems reveals that he continues to have issues related to dyspnea on exertion. This was evaluated by cardiology after he had complained of this to me previously. He had a negative work-up. His main issues are when he walks up an incline he will feel more short of breath and he \"used to feel\". He reports no wheezing, cough, orthopnea or PND. No peripheral edema is reported. In addition the patient reports ongoing struggles with generalized unsteadiness. No falls are reported. This unsteadiness seems to be becoming more prominent over the last 6 to 8 months. HPI  See above    Past Medical History, Past Surgical History, Family history, Social History, and Medications were all reviewed with the patient today and updated as necessary. Current Outpatient Medications   Medication Sig Dispense Refill    atorvastatin (LIPITOR) 40 MG tablet Take 1 tablet by mouth in the morning. TAKE 1 TABLET DAILY.  90 tablet 3    isosorbide mononitrate (IMDUR) 30 MG extended release tablet TAKE 1 TABLET DAILY FOR CHRONIC STABLE ANGINA PECTORIS 90 tablet 3    amLODIPine (NORVASC) 5 MG tablet TAKE 1 TABLET BY MOUTH EVERY DAY 90 tablet 1    pantoprazole (PROTONIX) 40 MG tablet TAKE 1 TABLET BY MOUTH TWICE A  tablet 3    acetaminophen (TYLENOL) 650 MG extended release tablet Take 650 mg by mouth every 6 hours as needed      aspirin 81 MG EC tablet Take 81 mg by mouth daily      clopidogrel (PLAVIX) 75 MG tablet TAKE 1 TABLET DAILY      guaiFENesin (MUCINEX) 600 MG extended release tablet Take 600 mg by mouth 2 times daily      hydroCHLOROthiazide (HYDRODIURIL) 25 MG tablet Take 25 mg by mouth daily      losartan (COZAAR) 100 MG tablet Take 100 mg by mouth      Melatonin 5 MG CAPS Take 5 mg by mouth      metoprolol succinate (TOPROL XL) 100 MG extended release tablet TAKE 1 TABLET ONCE DAILY      nitroGLYCERIN (NITROSTAT) 0.4 MG SL tablet Place 0.4 mg under the tongue      pramipexole (MIRAPEX) 0.5 MG tablet TAKE 1 TABLET NIGHTLY FOR RESTLESS LEGS SYNDROME      triamcinolone (KENALOG) 0.1 % cream APPLY TWICE DAILY TO ECZEMA UP TO 2 WEEKS/MONTH AS NEEDED. NOT FOR FACE, GROIN, AXILLA      methylPREDNISolone (MEDROL DOSEPACK) 4 MG tablet Follow package instructions (Patient not taking: Reported on 9/19/2022) 1 kit 0     No current facility-administered medications for this visit. Allergies   Allergen Reactions    Amoxicillin-Pot Clavulanate Nausea And Vomiting     Severe abd cramping  Severe abd cramping    Oxycodone Other (See Comments)     Sever headaches, abdominal issues     Patient Active Problem List   Diagnosis    Arthritis of shoulder region, right, degenerative    Pericarditis with effusion    Pleural effusion, left    Coronary artery disease involving native coronary artery of native heart without angina pectoris    IBS (irritable bowel syndrome)    Allergic rhinitis    BPH (benign prostatic hyperplasia)    Hypercholesterolemia    Restless leg syndrome    Peptic ulcer disease    BEATRIS (obstructive sleep apnea)    Pleural effusion, right    Hypertension    Right hand pain    Right carpal tunnel syndrome     Past Medical History:   Diagnosis Date    Acute respiratory failure ([facility] [place] [address] 08/29/2018    LLL pneumonia with bilateral pleural effusions- admission [place]    Allergic rhinitis     Anemia     Aortic insufficiency     Aortic sclerosis     no stenosis- ECHO 9/8/14  LVEF 50-55%    BPH with urinary obstruction     resolved with surgery    CAD (coronary artery disease)     stent X1 in 2010. .managed with medications    Chronic pain     right shoulder    Colon polyps     Depression     Diverticulitis     Dyslipidemia     Dyslipidemia     managed with medications    Former pipe smoker     stopped at age 30    GERD (gastroesophageal reflux disease)     managed with PRN medications    GI bleed     from Plavix    Heart murmur     History of depression     Hypercholesterolemia     managed with meds    Hypertension     controlled w/med    IBS (irritable bowel syndrome)     manged with medications    Ill-defined condition     pericarditis    Macular degeneration     managed with medications    Macular degeneration     Non-STEMI (non-ST elevated myocardial infarction) ([facility] [address] 2015    Old MI (myocardial infarction)     x2; last 2014    Osteoarthritis     in shoulders and back    Pleural effusion     PUD (peptic ulcer disease) 3/2011    no recent episodes    PUD (peptic ulcer disease)     Restless leg syndrome     manged with medications    Seasonal allergic rhinitis     PRN medications    Unspecified sleep apnea     wears cpap     Past Surgical History:   Procedure Laterality Date    ADENOIDECTOMY      CARDIAC CATHETERIZATION  2014    no stent required    CARDIAC CATHETERIZATION  , 3/2011,2011    (1)stent 2010     CATARACT REMOVAL Bilateral 2009    COLONOSCOPY      SHOULDER ARTHROPLASTY Right 2015    SHOULDER ARTHROSCOPY Right  approx    SHOULDER ARTHROSCOPY Left 2017    TONSILLECTOMY AND ADENOIDECTOMY      as a child    TURP  05/15; 07/15     Family History   Problem Relation Age of Onset    High Cholesterol Mother     Heart Disease Mother     Hypertension Brother     Heart Disease Brother     Cancer Father         lung cancer--    Coronary Art Dis Mother     Hypertension Mother      Social History     Tobacco Use    Smoking status: Former     Packs/day: 0.00     Types: Cigarettes     Quit date: 1960     Years since quittin.7    Smokeless tobacco: Never    Tobacco comments:     Quit smoking: quit smoking pipe --- no cigs   Substance Use Topics    Alcohol use: No         Review of Systems  See above    OBJECTIVE:  /60   Pulse 54   Ht 5' 8\" (1.727 m)   Wt 179 lb 9.6 oz (81.5 kg)   SpO2 97%   BMI 27.31 kg/m²      Physical Exam  Constitutional:       General: He is not in acute distress. Appearance: Normal appearance. He is not ill-appearing. HENT:      Head: Normocephalic and atraumatic. Cardiovascular:      Rate and Rhythm: Normal rate and regular rhythm. Heart sounds: Normal heart sounds. No murmur heard. Pulmonary:      Effort: Pulmonary effort is normal.      Breath sounds: Normal breath sounds. No wheezing or rhonchi. Musculoskeletal:         General: Normal range of motion. Cervical back: Normal range of motion and neck supple. Right lower leg: No edema. Left lower leg: No edema. Skin:     General: Skin is warm. Findings: No rash. Neurological:      Mental Status: He is alert and oriented to person, place, and time. Psychiatric:         Mood and Affect: Mood normal.         Behavior: Behavior normal.         Thought Content: Thought content normal.         Judgment: Judgment normal.       Medical problems and test results were reviewed with the patient today. ASSESSMENT and PLAN    1. High cholesterol. LDL 68. Liver enzymes remain normal.  Continue current therapy. 2.  Hypertension. /60. Renal function and electrolytes are normal.    3.  CAD. Per cardiology. No chest pain reported. 4.  Dyspnea on exertion. Discussed potential continued work-up to include pulmonary referral.  Physical deconditioning likely also contributing some. Negative cardiac work-up. Patient elects for observation for now. He will let me know if symptoms worsen. 5.  Unsteadiness. Recommend physical therapy for balance training. Patient states that he is in agreement. He will let me know the name of a specific physical therapist he would like to be referred to. Elements of this note have been dictated using speech recognition software. As a result, errors of speech recognition may have occurred.

## 2022-09-26 ENCOUNTER — OFFICE VISIT (OUTPATIENT)
Dept: PHYSICAL MEDICINE AND REHAB | Age: 82
End: 2022-09-26
Payer: MEDICARE

## 2022-09-26 VITALS
HEART RATE: 62 BPM | HEIGHT: 68 IN | WEIGHT: 179 LBS | SYSTOLIC BLOOD PRESSURE: 141 MMHG | DIASTOLIC BLOOD PRESSURE: 61 MMHG | BODY MASS INDEX: 27.13 KG/M2

## 2022-09-26 DIAGNOSIS — G56.01 RIGHT CARPAL TUNNEL SYNDROME: Primary | ICD-10-CM

## 2022-09-26 PROCEDURE — 95909 NRV CNDJ TST 5-6 STUDIES: CPT | Performed by: PHYSICAL MEDICINE & REHABILITATION

## 2022-10-07 RX ORDER — CLOPIDOGREL BISULFATE 75 MG/1
TABLET ORAL
Qty: 90 TABLET | Refills: 3 | Status: SHIPPED | OUTPATIENT
Start: 2022-10-07

## 2022-10-14 ENCOUNTER — OFFICE VISIT (OUTPATIENT)
Dept: FAMILY MEDICINE CLINIC | Facility: CLINIC | Age: 82
End: 2022-10-14
Payer: MEDICARE

## 2022-10-14 VITALS
HEIGHT: 68 IN | OXYGEN SATURATION: 96 % | WEIGHT: 175 LBS | DIASTOLIC BLOOD PRESSURE: 62 MMHG | TEMPERATURE: 98.1 F | SYSTOLIC BLOOD PRESSURE: 138 MMHG | BODY MASS INDEX: 26.52 KG/M2 | HEART RATE: 61 BPM

## 2022-10-14 DIAGNOSIS — J20.8 ACUTE BACTERIAL BRONCHITIS: ICD-10-CM

## 2022-10-14 DIAGNOSIS — R06.09 DOE (DYSPNEA ON EXERTION): ICD-10-CM

## 2022-10-14 DIAGNOSIS — J30.9 ALLERGIC RHINITIS, UNSPECIFIED SEASONALITY, UNSPECIFIED TRIGGER: ICD-10-CM

## 2022-10-14 DIAGNOSIS — R05.1 ACUTE COUGH: Primary | ICD-10-CM

## 2022-10-14 DIAGNOSIS — B96.89 ACUTE BACTERIAL BRONCHITIS: ICD-10-CM

## 2022-10-14 DIAGNOSIS — R09.89 CHEST CONGESTION: ICD-10-CM

## 2022-10-14 DIAGNOSIS — I10 PRIMARY HYPERTENSION: ICD-10-CM

## 2022-10-14 PROCEDURE — 1036F TOBACCO NON-USER: CPT | Performed by: FAMILY MEDICINE

## 2022-10-14 PROCEDURE — G8484 FLU IMMUNIZE NO ADMIN: HCPCS | Performed by: FAMILY MEDICINE

## 2022-10-14 PROCEDURE — 99213 OFFICE O/P EST LOW 20 MIN: CPT | Performed by: FAMILY MEDICINE

## 2022-10-14 PROCEDURE — G8417 CALC BMI ABV UP PARAM F/U: HCPCS | Performed by: FAMILY MEDICINE

## 2022-10-14 PROCEDURE — 1123F ACP DISCUSS/DSCN MKR DOCD: CPT | Performed by: FAMILY MEDICINE

## 2022-10-14 PROCEDURE — 96372 THER/PROPH/DIAG INJ SC/IM: CPT | Performed by: FAMILY MEDICINE

## 2022-10-14 PROCEDURE — G8427 DOCREV CUR MEDS BY ELIG CLIN: HCPCS | Performed by: FAMILY MEDICINE

## 2022-10-14 RX ORDER — TRIAMCINOLONE ACETONIDE 40 MG/ML
40 INJECTION, SUSPENSION INTRA-ARTICULAR; INTRAMUSCULAR ONCE
Status: COMPLETED | OUTPATIENT
Start: 2022-10-14 | End: 2022-10-14

## 2022-10-14 RX ORDER — AZITHROMYCIN 250 MG/1
TABLET, FILM COATED ORAL
Qty: 6 TABLET | Refills: 0 | Status: SHIPPED | OUTPATIENT
Start: 2022-10-14 | End: 2022-10-21

## 2022-10-14 RX ORDER — AMLODIPINE BESYLATE 5 MG/1
TABLET ORAL
Qty: 90 TABLET | Refills: 3 | Status: SHIPPED | OUTPATIENT
Start: 2022-10-14

## 2022-10-14 RX ADMIN — TRIAMCINOLONE ACETONIDE 40 MG: 40 INJECTION, SUSPENSION INTRA-ARTICULAR; INTRAMUSCULAR at 10:14

## 2022-10-14 ASSESSMENT — PATIENT HEALTH QUESTIONNAIRE - PHQ9
2. FEELING DOWN, DEPRESSED OR HOPELESS: 0
SUM OF ALL RESPONSES TO PHQ QUESTIONS 1-9: 0
SUM OF ALL RESPONSES TO PHQ9 QUESTIONS 1 & 2: 0
1. LITTLE INTEREST OR PLEASURE IN DOING THINGS: 0
SUM OF ALL RESPONSES TO PHQ QUESTIONS 1-9: 0

## 2022-10-14 NOTE — PROGRESS NOTES
SUBJECTIVE:   Nalini Partida is a [de-identified] y.o. male who has a past medical history significant for hypertension, CAD, recurrent pericarditis, history of GI bleed, peptic ulcer disease, BPH, reflux and high cholesterol. Review of systems reveals that he continues to have issues related to dyspnea on exertion. This was evaluated by cardiology negative findings. His symptoms continue. In addition the patient reports that for roughly a week he has been experiencing a productive cough, chest congestion and feeling poorly. 2 COVID test have been negative. No fever, body aches or chest pain reported. HPI  See above    Past Medical History, Past Surgical History, Family history, Social History, and Medications were all reviewed with the patient today and updated as necessary. Current Outpatient Medications   Medication Sig Dispense Refill    amLODIPine (NORVASC) 5 MG tablet TAKE 1 TABLET BY MOUTH EVERY DAY 90 tablet 3    clopidogrel (PLAVIX) 75 MG tablet TAKE 1 TABLET BY MOUTH EVERY DAY 90 tablet 3    atorvastatin (LIPITOR) 40 MG tablet Take 1 tablet by mouth in the morning. TAKE 1 TABLET DAILY.  90 tablet 3    isosorbide mononitrate (IMDUR) 30 MG extended release tablet TAKE 1 TABLET DAILY FOR CHRONIC STABLE ANGINA PECTORIS 90 tablet 3    pantoprazole (PROTONIX) 40 MG tablet TAKE 1 TABLET BY MOUTH TWICE A  tablet 3    acetaminophen (TYLENOL) 650 MG extended release tablet Take 650 mg by mouth every 6 hours as needed      aspirin 81 MG EC tablet Take 81 mg by mouth daily      guaiFENesin (MUCINEX) 600 MG extended release tablet Take 600 mg by mouth 2 times daily      hydroCHLOROthiazide (HYDRODIURIL) 25 MG tablet Take 25 mg by mouth daily      losartan (COZAAR) 100 MG tablet Take 100 mg by mouth      Melatonin 5 MG CAPS Take 5 mg by mouth      metoprolol succinate (TOPROL XL) 100 MG extended release tablet TAKE 1 TABLET ONCE DAILY      nitroGLYCERIN (NITROSTAT) 0.4 MG SL tablet Place 0.4 mg under the tongue      pramipexole (MIRAPEX) 0.5 MG tablet TAKE 1 TABLET NIGHTLY FOR RESTLESS LEGS SYNDROME      triamcinolone (KENALOG) 0.1 % cream APPLY TWICE DAILY TO ECZEMA UP TO 2 WEEKS/MONTH AS NEEDED. NOT FOR FACE, GROIN, AXILLA      methylPREDNISolone (MEDROL DOSEPACK) 4 MG tablet Follow package instructions (Patient not taking: Reported on 9/19/2022) 1 kit 0     No current facility-administered medications for this visit. Allergies   Allergen Reactions    Amoxicillin-Pot Clavulanate Nausea And Vomiting     Severe abd cramping  Severe abd cramping    Oxycodone Other (See Comments)     Sever headaches, abdominal issues     Patient Active Problem List   Diagnosis    Arthritis of shoulder region, right, degenerative    Pericarditis with effusion    Pleural effusion, left    Coronary artery disease involving native coronary artery of native heart without angina pectoris    IBS (irritable bowel syndrome)    Allergic rhinitis    BPH (benign prostatic hyperplasia)    Hypercholesterolemia    Restless leg syndrome    Peptic ulcer disease    BEATRIS (obstructive sleep apnea)    Pleural effusion, right    Hypertension    Right hand pain    Right carpal tunnel syndrome     Past Medical History:   Diagnosis Date    Acute respiratory failure ([facility] [address] 08/29/2018    LLL pneumonia with bilateral pleural effusions- admission [place]    Allergic rhinitis     Anemia     Aortic insufficiency     Aortic sclerosis     no stenosis- ECHO 9/8/14  LVEF 50-55%    BPH with urinary obstruction     resolved with surgery    CAD (coronary artery disease)     stent X1 in 2010. .managed with medications    Chronic pain     right shoulder    Colon polyps     Depression     Diverticulitis     Dyslipidemia     Dyslipidemia     managed with medications    Former pipe smoker     stopped at age 30    GERD (gastroesophageal reflux disease)     managed with PRN medications    GI bleed     from Plavix    Heart murmur     History of depression Hypercholesterolemia     managed with meds    Hypertension     controlled w/med    IBS (irritable bowel syndrome)     manged with medications    Ill-defined condition     pericarditis    Macular degeneration     managed with medications    Macular degeneration     [facility]    Old MI (myocardial infarction)     x2; last 2014    Osteoarthritis     in shoulders and back    Pleural effusion     PUD (peptic ulcer disease) 3/2011    no recent episodes    PUD (peptic ulcer disease)     Restless leg syndrome     manged with medications    Seasonal allergic rhinitis     PRN medications    Unspecified sleep apnea     wears cpap     Past Surgical History:   Procedure Laterality Date    ADENOIDECTOMY      CARDIAC CATHETERIZATION  2014    no stent required    CARDIAC CATHETERIZATION  , 3/2011,2011    (1)stent 2010     CATARACT REMOVAL Bilateral 2009    COLONOSCOPY      SHOULDER ARTHROPLASTY Right 2015    SHOULDER ARTHROSCOPY Right  approx    SHOULDER ARTHROSCOPY Left 2017    TONSILLECTOMY AND ADENOIDECTOMY      as a child    TURP  05/15; 07/15     Family History   Problem Relation Age of Onset    High Cholesterol Mother     Heart Disease Mother     Hypertension Brother     Heart Disease Brother     Cancer Father         lung cancer--    Coronary Art Dis Mother     Hypertension Mother      Social History     Tobacco Use    Smoking status: Former     Packs/day: 0.00     Types: Cigarettes     Quit date: 1960     Years since quittin.8    Smokeless tobacco: Never    Tobacco comments:     Quit smoking: quit smoking pipe --- no cigs   Substance Use Topics    Alcohol use: No         Review of Systems  See above    OBJECTIVE:  /62   Pulse 61   Temp 98.1 °F (36.7 °C) (Oral)   Ht 5' 8\" (1.727 m)   Wt 175 lb (79.4 kg)   SpO2 96%   BMI 26.61 kg/m²      Physical Exam  Constitutional:       General: He is not in acute distress. Appearance: Normal appearance. He is not ill-appearing. HENT:      Head: Normocephalic and atraumatic. Ears:      Comments: TMs dull bilaterally with clear effusions. OP is erythematous with postnasal drip. No exudate. Cardiovascular:      Rate and Rhythm: Normal rate and regular rhythm. Heart sounds: Normal heart sounds. No murmur heard. Pulmonary:      Effort: Pulmonary effort is normal.      Breath sounds: Rhonchi present. No wheezing. Musculoskeletal:         General: Normal range of motion. Cervical back: Normal range of motion and neck supple. Right lower leg: No edema. Left lower leg: No edema. Skin:     General: Skin is warm. Findings: No rash. Neurological:      Mental Status: He is alert and oriented to person, place, and time. Psychiatric:         Mood and Affect: Mood normal.         Behavior: Behavior normal.         Thought Content: Thought content normal.         Judgment: Judgment normal.       Medical problems and test results were reviewed with the patient today. ASSESSMENT and PLAN    1. Cough. Underlying bronchitis. Over-the-counter Mucinex and/or Delsym as needed. 2.  Chest congestion. As above. 3.  Allergic rhinitis. 1 cc of Kenalog IM. As above. 4.  Bronchitis. Z-Dl as directed. 5.  Dyspnea on exertion. Negative cardiac work-up. Symptoms persist.  Refer to pulmonary for further evaluation. 6.  Hypertension. /62. Continue current therapy. Elements of this note have been dictated using speech recognition software. As a result, errors of speech recognition may have occurred.

## 2022-10-18 ENCOUNTER — OFFICE VISIT (OUTPATIENT)
Dept: ORTHOPEDIC SURGERY | Age: 82
End: 2022-10-18
Payer: MEDICARE

## 2022-10-18 DIAGNOSIS — G56.01 RIGHT CARPAL TUNNEL SYNDROME: Primary | ICD-10-CM

## 2022-10-18 PROCEDURE — 99213 OFFICE O/P EST LOW 20 MIN: CPT | Performed by: NURSE PRACTITIONER

## 2022-10-18 PROCEDURE — G8484 FLU IMMUNIZE NO ADMIN: HCPCS | Performed by: NURSE PRACTITIONER

## 2022-10-18 PROCEDURE — G8417 CALC BMI ABV UP PARAM F/U: HCPCS | Performed by: NURSE PRACTITIONER

## 2022-10-18 PROCEDURE — 1123F ACP DISCUSS/DSCN MKR DOCD: CPT | Performed by: NURSE PRACTITIONER

## 2022-10-18 PROCEDURE — 1036F TOBACCO NON-USER: CPT | Performed by: NURSE PRACTITIONER

## 2022-10-18 PROCEDURE — G8427 DOCREV CUR MEDS BY ELIG CLIN: HCPCS | Performed by: NURSE PRACTITIONER

## 2022-10-18 NOTE — PROGRESS NOTES
Orthopaedic Hand Surgery Note    Name: Jarrod Carrera  YOB: 1940  Gender: male  MRN: [id number]    CC: Follow up of right carpal tunnel     HPI: Patient is a [de-identified] y.o. male who is here regarding follow up for right hand numbness and tingling. Patient received a right carpal tunnel injection on August 16, 2022. He comes in today for his 2-month recheck. He says the injection helped. He said he has slight tingling at times. He says he does not notice it unless he sits to think about it. ROS/Meds/PSH/PMH/FH/SH: I personally reviewed the patients standard intake form. Pertinents are discussed in the HPI    Physical Examination:  Musculoskeletal:   Cervical spine has normal range of motion without tenderness to palpation, negative Spurling's test. Shoulders and elbows have normal pain free range of motion. Examination of the right upper extremity demonstrates Normal sensation to light touch in the median distribution, normal sensation in ulnar and radial distribution, Negative carpal tunnel compression testing and Phalen testing, cap refill < 5 seconds in all fingers. Inspection reveals no thenar atrophy. Negative Tinel and elbow flexion compression test of the cubital tunnel, negative Tinel over Guyon's canal. Sensation to light touch in the ulnar 2 digits is normal with no intrinsic atrophy/weakness. No tenderness to palpation or masses noted in the forearm. Imaging / Electrodiagnostic Tests:     None    Assessment:   No diagnosis found. Plan:  We discussed the diagnosis and different treatment options. We discussed observation, EMG/NCV, night splinting, cortisone injections and surgical decompression and the risks and benefits of all were clearly outlined. After discussing in detail the patient elects to proceed with continuing to observe. We discussed repeat injections versus surgery in detail. We discussed surgical intervention including the postoperative care in detail. Patient will call us if his symptoms return. He will continue to wear his brace nightly. Patient voiced accordance and understanding of the information provided and the formulated plan. All questions were answered to the patient's satisfaction during the encounter.     Treatment at this time: [address] [place], [place] [address]ANGELO  CNP  Orthopaedic Surgery  10/18/22  10:31 AM

## 2022-10-18 NOTE — PROGRESS NOTES
Caller: Kash Shipman    [facility] Note    Name: Екатерина Peters  YOB: 1940  Gender: male  MRN: [id number]      CC: Patient referred for evaluation of right upper extremity pain    HPI: Екатерина Peters is a 80 y.o. male right hand dominant with a chief complaint of left hand pain/numbness and tingling. He reports approximately 2 weeks ago he was trying to get in his truck and lost his balance. He said he grabbed the bar of his truck catching his ring finger. He said the ring finger bent backwards. He said he had a sharp pain that ran from his hand down to his elbow. He said it went away after several seconds. He said since then he has developed numbness and tingling in his entire hand and fingers. He says it feels tight. He says this is waking him up in the middle the night. He said he was told several years ago by physician he had carpal tunnel syndrome. He is very active and enjoys going to the gym 5 days a week. He saw his primary care doctor last week who referred him to orthopedics for follow-up. Unable to take anti-inflammatories due to a gastric problem. ROS/Meds/PSH/PMH/FH/SH: I personally reviewed the patients standard intake form. Pertinents are discussed in the HPI    Physical Examination:  General: Awake and alert. HEENT: Normocephalic, atraumatic  CV/Pulm: Breathing even and unlabored  Skin: No obvious rashes noted. Lymphatic: No obvious evidence of lymphedema or lymphadenopathy    Musculoskeletal Exam:  Examination on the right upper extremity demonstrates cap refill < 5 seconds in all fingers  No swelling or ecchymosis to the right forearm, wrist, hand, fingers. Cervical spine has normal range of motion without tenderness to palpation, negative Spurling's test. Shoulders and elbows have normal pain free range of motion.     Examination of the right upper extremity demonstrates Decreased sensation to light touch in the median distribution, normal sensation in ulnar and radial distribution, positive carpal tunnel compression testing and Phalen testing, cap refill < 5 seconds in all fingers. Inspection reveals no thenar atrophy. Negative Tinel and elbow flexion compression test of the cubital tunnel, negative Tinel over Guyon's canal. Sensation to light touch in the ulnar 2 digits is decreased with no intrinsic atrophy/weakness. No tenderness to palpation or masses noted in the forearm. Imaging / Electrodiagnostic Tests:     X-rays include a 3 view right hand including a CMC view are reviewed. Patient has right thumb CMC arthritis    Assessment:   1. Right hand pain    2. Right carpal tunnel syndrome        Plan:   We discussed the diagnosis and different treatment options. We discussed observation, therapy, antiinflammatory medications and other pertinent treatment modalities. After discussing in detail the patient elects to proceed with medrol dose pack and wrist splint. We will see him back in 2 weeks to reassess his progress. Again he is not able to take anti-inflammatories due to a gastric issue. .     Patient voiced accordance and understanding of the information provided and the formulated plan. All questions were answered to the patient's satisfaction during the encounter.     4 This is an undiagnosed new problem with uncertain prognosis  Treatment at this time: Prescription medication and Brace    ANGELO Alanis - CNP  Orthopaedic Surgery  07/12/22  11:34 AM

## 2022-10-21 ENCOUNTER — OFFICE VISIT (OUTPATIENT)
Dept: PULMONOLOGY | Age: 82
End: 2022-10-21
Payer: MEDICARE

## 2022-10-21 ENCOUNTER — HOSPITAL ENCOUNTER (OUTPATIENT)
Dept: GENERAL RADIOLOGY | Age: 82
Discharge: HOME | End: 2022-10-24
Payer: MEDICARE

## 2022-10-21 VITALS
HEART RATE: 55 BPM | SYSTOLIC BLOOD PRESSURE: 143 MMHG | DIASTOLIC BLOOD PRESSURE: 64 MMHG | OXYGEN SATURATION: 95 % | BODY MASS INDEX: 26.98 KG/M2 | TEMPERATURE: 97 F | WEIGHT: 178 LBS | RESPIRATION RATE: 15 BRPM | HEIGHT: 68 IN

## 2022-10-21 DIAGNOSIS — R06.02 SHORTNESS OF BREATH: Primary | ICD-10-CM

## 2022-10-21 DIAGNOSIS — R06.02 SHORTNESS OF BREATH: ICD-10-CM

## 2022-10-21 LAB
EXPIRATORY TIME: NORMAL
FEF 25-75% %PRED-PRE: NORMAL
FEF 25-75% PRED: NORMAL
FEF 25-75%-PRE: NORMAL
FEV1 %PRED-PRE: 72 %
FEV1 PRED: 2.62 L
FEV1/FVC %PRED-PRE: NORMAL
FEV1/FVC PRED: NORMAL
FEV1/FVC: 77 %
FEV1: 2.32 L
FVC %PRED-PRE: NORMAL
FVC PRED: 3.7 L
FVC: 3.01 L
PEF %PRED-PRE: NORMAL
PEF PRED: NORMAL
PEF-PRE: NORMAL

## 2022-10-21 PROCEDURE — G8428 CUR MEDS NOT DOCUMENT: HCPCS | Performed by: INTERNAL MEDICINE

## 2022-10-21 PROCEDURE — 1123F ACP DISCUSS/DSCN MKR DOCD: CPT | Performed by: INTERNAL MEDICINE

## 2022-10-21 PROCEDURE — 99214 OFFICE O/P EST MOD 30 MIN: CPT | Performed by: INTERNAL MEDICINE

## 2022-10-21 PROCEDURE — 71046 X-RAY EXAM CHEST 2 VIEWS: CPT

## 2022-10-21 PROCEDURE — 1036F TOBACCO NON-USER: CPT | Performed by: INTERNAL MEDICINE

## 2022-10-21 PROCEDURE — G8484 FLU IMMUNIZE NO ADMIN: HCPCS | Performed by: INTERNAL MEDICINE

## 2022-10-21 PROCEDURE — 94010 BREATHING CAPACITY TEST: CPT | Performed by: INTERNAL MEDICINE

## 2022-10-21 PROCEDURE — G8417 CALC BMI ABV UP PARAM F/U: HCPCS | Performed by: INTERNAL MEDICINE

## 2022-10-21 ASSESSMENT — PULMONARY FUNCTION TESTS
FVC: 3.01
FEV1/FVC: 77
FEV1_PREDICTED: 2.62
FVC_PREDICTED: 3.70
FEV1_PERCENT_PREDICTED_PRE: 72
FEV1: 2.32

## 2022-10-21 NOTE — PROGRESS NOTES
[address] [facility] [address] [facility], [address] [facility]  [phone]    Patient Name:  Burke Garza  YOB: 1940      Office Visit 10/21/2022    CHIEF COMPLAINT:    Chief Complaint   Patient presents with    Shortness of Breath    Fatigue         HISTORY OF PRESENT ILLNESS:    This is a delightful 82-year-old white male that looks younger than his age very active person who presents for evaluation of dyspnea on exertion since around May of this year. He does not remember any inciting factors he does notice that with the usual exertion that he is used to he can perform the task but it is resulting in him having dyspnea. He had a cardiac evaluation which was completely normal apparently and he is here for further evaluation to see whether there is any underlying pulmonary disease to cause this phenomenon. He is aware of an elevated right hemidiaphragm for at least 8 years, he has had no recent infections that he can speak off and he does not remember any triggers around May to cause him to have sudden onset of problems. He denies any fevers, chills, coughing, wheezing or chest pain. As I mentioned he still able to perform the tasks go to the gym and exert himself but he feels that he is more short of breath than usual when he does these things. He denies any weight gain he denies any weight loss, he has obstructive sleep apnea and is compliant with CPAP therapy. 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  He has no exposure to birds.       Past Medical History:   Diagnosis Date    Acute respiratory failure ([facility] [address] 08/29/2018    LLL pneumonia with bilateral pleural effusions- admission [facility]    Allergic rhinitis     Anemia     Aortic insufficiency     Aortic sclerosis     no stenosis- ECHO 9/8/14  LVEF 50-55%    BPH with urinary obstruction     resolved with surgery    CAD (coronary artery disease)     stent X1 in 2010. .managed with medications    Chronic pain     right shoulder    Colon polyps     Depression     Diverticulitis     Dyslipidemia     Dyslipidemia     managed with medications    Former pipe smoker     stopped at age 30    GERD (gastroesophageal reflux disease)     managed with PRN medications    GI bleed     from Plavix    Heart murmur     History of depression     Hypercholesterolemia     managed with meds    Hypertension     controlled w/med    IBS (irritable bowel syndrome)     manged with medications    Ill-defined condition     pericarditis    Macular degeneration     managed with medications    Macular degeneration     Non-STEMI (non-ST elevated myocardial infarction) ([facility] [address] 09/2015    Old MI (myocardial infarction)     x2; last 9/2014    Osteoarthritis     in shoulders and back    Pleural effusion     PUD (peptic ulcer disease) 3/2011    no recent episodes    PUD (peptic ulcer disease)     Restless leg syndrome     manged with medications    Seasonal allergic rhinitis     PRN medications    Unspecified sleep apnea     wears cpap         Patient Active Problem List   Diagnosis    Arthritis of shoulder region, right, degenerative    Pericarditis with effusion    Pleural effusion, left    Coronary artery disease involving native coronary artery of native heart without angina pectoris    IBS (irritable bowel syndrome)    Allergic rhinitis    BPH (benign prostatic hyperplasia)    Hypercholesterolemia    Restless leg syndrome    Peptic ulcer disease    BEATRIS (obstructive sleep apnea)    Pleural effusion, right    Hypertension    Right hand pain    Right carpal tunnel syndrome           Past Surgical History:   Procedure Laterality Date ADENOIDECTOMY      CARDIAC CATHETERIZATION  2014    no stent required    CARDIAC CATHETERIZATION  , 3/2011,2011    (1)stent 2010     CATARACT REMOVAL Bilateral 2009    COLONOSCOPY      SHOULDER ARTHROPLASTY Right 2015    SHOULDER ARTHROSCOPY Right 2010 approx    SHOULDER ARTHROSCOPY Left 2017    TONSILLECTOMY AND ADENOIDECTOMY      as a child    TURP  05/15; 07/15         Social History     Socioeconomic History    Marital status:      Spouse name: Not on file    Number of children: Not on file    Years of education: Not on file    Highest education level: Not on file   Occupational History    Not on file   Tobacco Use    Smoking status: Former     Packs/day: 0.00     Types: Cigarettes, Pipe     Quit date: 1974     Years since quittin.8    Smokeless tobacco: Never    Tobacco comments:     Quit smoking: quit smoking pipe --- no cigs   Substance and Sexual Activity    Alcohol use: No    Drug use: No    Sexual activity: Not on file   Other Topics Concern    Not on file   Social History Narrative     and lives with wife. Retired.      Social Determinants of Health     Financial Resource Strain: Not on file   Food Insecurity: Not on file   Transportation Needs: Not on file   Physical Activity: Not on file   Stress: Not on file   Social Connections: Not on file   Intimate Partner Violence: Not on file   Housing Stability: Not on file         Family History   Problem Relation Age of Onset    High Cholesterol Mother     Heart Disease Mother     Hypertension Brother     Heart Disease Brother     Cancer Father         lung cancer--    Coronary Art Dis Mother     Hypertension Mother          Allergies   Allergen Reactions    Amoxicillin-Pot Clavulanate Nausea And Vomiting     Severe abd cramping  Severe abd cramping    Oxycodone Other (See Comments)     Sever headaches, abdominal issues         Current Outpatient Medications   Medication Sig    amLODIPine (NORVASC) 5 MG tablet TAKE 1 TABLET BY MOUTH EVERY DAY    clopidogrel (PLAVIX) 75 MG tablet TAKE 1 TABLET BY MOUTH EVERY DAY    atorvastatin (LIPITOR) 40 MG tablet Take 1 tablet by mouth in the morning. TAKE 1 TABLET DAILY. isosorbide mononitrate (IMDUR) 30 MG extended release tablet TAKE 1 TABLET DAILY FOR CHRONIC STABLE ANGINA PECTORIS    pantoprazole (PROTONIX) 40 MG tablet TAKE 1 TABLET BY MOUTH TWICE A DAY    acetaminophen (TYLENOL) 650 MG extended release tablet Take 650 mg by mouth every 6 hours as needed    aspirin 81 MG EC tablet Take 81 mg by mouth daily    guaiFENesin (MUCINEX) 600 MG extended release tablet Take 600 mg by mouth 2 times daily    hydroCHLOROthiazide (HYDRODIURIL) 25 MG tablet Take 25 mg by mouth daily    losartan (COZAAR) 100 MG tablet Take 100 mg by mouth    Melatonin 5 MG CAPS Take 5 mg by mouth    metoprolol succinate (TOPROL XL) 100 MG extended release tablet TAKE 1 TABLET ONCE DAILY    nitroGLYCERIN (NITROSTAT) 0.4 MG SL tablet Place 0.4 mg under the tongue    pramipexole (MIRAPEX) 0.5 MG tablet TAKE 1 TABLET NIGHTLY FOR RESTLESS LEGS SYNDROME    azithromycin (ZITHROMAX) 250 MG tablet Take 2 Tablets with food by mouth first day, then 1 tab with food by mouth daily for days 2 through 5.    methylPREDNISolone (MEDROL DOSEPACK) 4 MG tablet Follow package instructions (Patient not taking: Reported on 9/19/2022)    triamcinolone (KENALOG) 0.1 % cream APPLY TWICE DAILY TO ECZEMA UP TO 2 WEEKS/MONTH AS NEEDED. NOT FOR FACE, GROIN, AXILLA     No current facility-administered medications for this visit. Review of Systems        PHYSICAL EXAM:    Vitals:    10/21/22 1510   BP: (!) 143/64   Pulse: 55   Resp: 15   Temp: 97 °F (36.1 °C)   SpO2: 95%        GENERAL APPEARANCE:   The patient is fit gentleman, with normal weight and in no respiratory distress that looks younger than his stated age   [de-identified]:   PERRL. Conjunctivae unremarkable. Nasal mucosa is without epistaxis, exudate, or polyps.   Gums and dentition are unremarkable. There is no oropharyngeal narrowing. TMs are clear. NECK/LYMPHATIC:   Symmetrical with no elevation of jugular venous pulsation. Trachea midline. No thyroid enlargement. No cervical adenopathy. LUNGS:   Normal respiratory effort with symmetrical lung expansion. Breath sounds clear to auscultation bilaterally with no rhonchi wheezing crackles noted. HEART:   There is a regular rate and rhythm. No murmur, rub, or gallop. There is no edema in the lower extremities. ABDOMEN:   Soft and non-tender. No hepatosplenomegaly. Bowel sounds are normal.     NEURO:   The patient is alert and oriented to person, place, and time. Memory appears intact and mood is normal.  No gross sensorimotor deficits are present. DIAGNOSTIC TESTS:      CXR:     Spirometry:  10.21.22        ASSESSMENT:  (Medical Decision Making)     Patient Active Problem List   Diagnosis    Arthritis of shoulder region, right, degenerative    Pericarditis with effusion    Pleural effusion, left    Coronary artery disease involving native coronary artery of native heart without angina pectoris    IBS (irritable bowel syndrome)    Allergic rhinitis    BPH (benign prostatic hyperplasia)    Hypercholesterolemia    Restless leg syndrome    Peptic ulcer disease    BEATRIS (obstructive sleep apnea)    Pleural effusion, right    Hypertension    Right hand pain    Right carpal tunnel syndrome           PLAN:  Encounter Diagnosis   Name Primary? Shortness of breath Yes   The patient symptoms are nonspecific and subjective. He had a normal cardiac evaluation, he has normal pulmonary function testing, and normal chest x-ray and a normal physical examination. He does have an elevated right hemidiaphragm but this is a chronic condition for at least 8 years as far as he knows and that should not explain sudden increase in dyspnea on exertion and may of this year.   It may well be that this is related to his age rather than any specific problem and that he is trying to exert himself at a higher level than his body can allow him without increased dyspnea but I think he is also in the right age group to try to exclude interstitial lung disease and I discussed this with him at length. He has no risk factors for hypersensitivity pneumonitis no symptoms or signs of sarcoidosis or other rheumatological disorders to explain pulmonary fibrosis moreover his physical exam is very normal.  Therefore complete pulmonary function will be performed with diffusion capacity assessment if abnormal if there is any hint of early interstitial lung disease decreased diffusion capacity decreased residual volume then we will proceed with a high-resolution CT scan of the chest with prone, supine, inspiratory and expiratory imaging to try to exclude that possibility. If confirmed he may need bronchoscopy with BAL and further basic evaluation for interstitial lung disease but I suspect if that is uncovered it would be early IPF rather than anything else and he would be a candidate for antifibrotic therapy. At this point in time however there is really no evidence of anything and we will start with complete pulmonary function testing in order to determine whether further testing is needed. The patient will return to the office for follow-up in 1 month to discuss the results of the complete pulmonary function testing and whether we need to do any further evaluation. I encouraged the patient to continue with being physically active I explained to him the phenomenon of physical deconditioning at his age with lack of activity I just instructed him to decrease the intensity of exertion in order to accommodate for the shortness of breath that he is subjectively experiencing. Any questions asked were answered seemingly to his and his wife satisfaction and to the best of my knowledge.   Patient will return to the office for follow-up in 1 month    Total time spent 45 minutes    Orders Placed This Encounter   Procedures    Spirometry Without Bronchodilator       No orders of the defined types were placed in this encounter. Cade Ortiz MD  Dictated using voice recognition software.  Proofread, but unrecognized voice recognition errors may exist.

## 2022-10-28 ENCOUNTER — HOSPITAL ENCOUNTER (OUTPATIENT)
Dept: PULMONOLOGY | Age: 82
Discharge: HOME | End: 2022-10-31
Payer: MEDICARE

## 2022-10-28 PROCEDURE — 94060 EVALUATION OF WHEEZING: CPT

## 2022-10-28 PROCEDURE — 94729 DIFFUSING CAPACITY: CPT

## 2022-10-28 PROCEDURE — 94726 PLETHYSMOGRAPHY LUNG VOLUMES: CPT

## 2022-11-16 ENCOUNTER — OFFICE VISIT (OUTPATIENT)
Dept: PULMONOLOGY | Age: 82
End: 2022-11-16
Payer: MEDICARE

## 2022-11-16 VITALS
SYSTOLIC BLOOD PRESSURE: 140 MMHG | HEART RATE: 75 BPM | TEMPERATURE: 98 F | DIASTOLIC BLOOD PRESSURE: 80 MMHG | HEIGHT: 68 IN | WEIGHT: 176 LBS | OXYGEN SATURATION: 95 % | BODY MASS INDEX: 26.67 KG/M2 | RESPIRATION RATE: 20 BRPM

## 2022-11-16 DIAGNOSIS — R06.02 SHORTNESS OF BREATH: Primary | ICD-10-CM

## 2022-11-16 DIAGNOSIS — R94.2 DECREASED DIFFUSION CAPACITY: ICD-10-CM

## 2022-11-16 DIAGNOSIS — J84.9 ILD (INTERSTITIAL LUNG DISEASE) (HCC): ICD-10-CM

## 2022-11-16 PROCEDURE — 99213 OFFICE O/P EST LOW 20 MIN: CPT | Performed by: INTERNAL MEDICINE

## 2022-11-16 PROCEDURE — G8417 CALC BMI ABV UP PARAM F/U: HCPCS | Performed by: INTERNAL MEDICINE

## 2022-11-16 PROCEDURE — G8484 FLU IMMUNIZE NO ADMIN: HCPCS | Performed by: INTERNAL MEDICINE

## 2022-11-16 PROCEDURE — 1123F ACP DISCUSS/DSCN MKR DOCD: CPT | Performed by: INTERNAL MEDICINE

## 2022-11-16 PROCEDURE — 3078F DIAST BP <80 MM HG: CPT | Performed by: INTERNAL MEDICINE

## 2022-11-16 PROCEDURE — 3074F SYST BP LT 130 MM HG: CPT | Performed by: INTERNAL MEDICINE

## 2022-11-16 PROCEDURE — G8427 DOCREV CUR MEDS BY ELIG CLIN: HCPCS | Performed by: INTERNAL MEDICINE

## 2022-11-16 PROCEDURE — 1036F TOBACCO NON-USER: CPT | Performed by: INTERNAL MEDICINE

## 2022-11-16 NOTE — PROGRESS NOTES
[address] [facility], [address] [facility]  [phone]    Patient Name:  Suzy Sosa  YOB: 1940      Office Visit 11/16/2022    CHIEF COMPLAINT:    Chief Complaint   Patient presents with    Shortness of Breath         HISTORY OF PRESENT ILLNESS:    Previously seen by:  Margarita Gonzáles MD on 10/21/22  This is a delightful 82-year-old white male that looks younger than his age very active person who presents for evaluation of dyspnea on exertion since around May of this year. He does not remember any inciting factors he does notice that with the usual exertion that he is used to he can perform the task but it is resulting in him having dyspnea. He had a cardiac evaluation which was completely normal apparently and he is here for further evaluation to see whether there is any underlying pulmonary disease to cause this phenomenon. He is aware of an elevated right hemidiaphragm for at least 8 years, he has had no recent infections that he can speak off and he does not remember any triggers around May to cause him to have sudden onset of problems. He denies any fevers, chills, coughing, wheezing or chest pain. As I mentioned he still able to perform the tasks go to the gym and exert himself but he feels that he is more short of breath than usual when he does these things. He denies any weight gain he denies any weight loss, he has obstructive sleep apnea and is compliant with CPAP therapy. 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  He has no exposure to birds. PLAN:  Encounter Diagnosis   Name Primary?     Shortness of breath Yes The patient symptoms are nonspecific and subjective. He had a normal cardiac evaluation, he has normal pulmonary function testing, and normal chest x-ray and a normal physical examination. He does have an elevated right hemidiaphragm but this is a chronic condition for at least 8 years as far as he knows and that should not explain sudden increase in dyspnea on exertion and may of this year. It may well be that this is related to his age rather than any specific problem and that he is trying to exert himself at a higher level than his body can allow him without increased dyspnea but I think he is also in the right age group to try to exclude interstitial lung disease and I discussed this with him at length. He has no risk factors for hypersensitivity pneumonitis no symptoms or signs of sarcoidosis or other rheumatological disorders to explain pulmonary fibrosis moreover his physical exam is very normal.  Therefore complete pulmonary function will be performed with diffusion capacity assessment if abnormal if there is any hint of early interstitial lung disease decreased diffusion capacity decreased residual volume then we will proceed with a high-resolution CT scan of the chest with prone, supine, inspiratory and expiratory imaging to try to exclude that possibility. If confirmed he may need bronchoscopy with BAL and further basic evaluation for interstitial lung disease but I suspect if that is uncovered it would be early IPF rather than anything else and he would be a candidate for antifibrotic therapy. At this point in time however there is really no evidence of anything and we will start with complete pulmonary function testing in order to determine whether further testing is needed. The patient will return to the office for follow-up in 1 month to discuss the results of the complete pulmonary function testing and whether we need to do any further evaluation.   I encouraged the patient to continue with being physically active I explained to him the phenomenon of physical deconditioning at his age with lack of activity I just instructed him to decrease the intensity of exertion in order to accommodate for the shortness of breath that he is subjectively experiencing. Any questions asked were answered seemingly to his and his wife satisfaction and to the best of my knowledge. Patient will return to the office for follow-up in 1 month      November 16, 2022:    The patient continues to have mild symptoms of shortness of breath on exertion, he had complete pulmonary function testing which revealed normal spirometry and lung volume analysis however did show a mild decline in diffusion capacity of 68% predicted he denies any cough, shortness of breath more than his usual that he presented with initially, hemoptysis, unintentional weight loss. Past Medical History:   Diagnosis Date    Acute respiratory failure ([facility] 08/29/2018    LLL pneumonia with bilateral pleural effusions- admission [place]    Allergic rhinitis     Anemia     Aortic insufficiency     Aortic sclerosis     no stenosis- ECHO 9/8/14  LVEF 50-55%    BPH with urinary obstruction     resolved with surgery    CAD (coronary artery disease)     stent X1 in 2010. .managed with medications    Chronic pain     right shoulder    Colon polyps     Depression     Diverticulitis     Dyslipidemia     Dyslipidemia     managed with medications    Former pipe smoker     stopped at age 30    GERD (gastroesophageal reflux disease)     managed with PRN medications    GI bleed     from Plavix    Heart murmur     History of depression     Hypercholesterolemia     managed with meds    Hypertension     controlled w/med    IBS (irritable bowel syndrome)     manged with medications    Ill-defined condition     pericarditis    Macular degeneration     managed with medications    Macular degeneration     Non-STEMI (non-ST elevated myocardial infarction) ([facility] 09/2015    Old MI (myocardial infarction)     x2; last 9/2014    Osteoarthritis     in shoulders and back    Pleural effusion     PUD (peptic ulcer disease) 3/2011    no recent episodes    PUD (peptic ulcer disease)     Restless leg syndrome     manged with medications    Seasonal allergic rhinitis     PRN medications    Unspecified sleep apnea     wears cpap         Patient Active Problem List   Diagnosis    Arthritis of shoulder region, right, degenerative    Pericarditis with effusion    Pleural effusion, left    Coronary artery disease involving native coronary artery of native heart without angina pectoris    IBS (irritable bowel syndrome)    Allergic rhinitis    BPH (benign prostatic hyperplasia)    Hypercholesterolemia    Restless leg syndrome    Peptic ulcer disease    BEATRIS (obstructive sleep apnea)    Pleural effusion, right    Hypertension    Right hand pain    Right carpal tunnel syndrome           Past Surgical History:   Procedure Laterality Date    ADENOIDECTOMY      CARDIAC CATHETERIZATION  9/2014    no stent required    CARDIAC CATHETERIZATION  2010, 3/2011,4/2011    (1)stent 2010     CATARACT REMOVAL Bilateral 2009    COLONOSCOPY      SHOULDER ARTHROPLASTY Right 2015    SHOULDER ARTHROSCOPY Right 2010 approx    SHOULDER ARTHROSCOPY Left 2017    TONSILLECTOMY AND ADENOIDECTOMY      as a child    TURP  05/15; 07/15         Social History     Socioeconomic History    Marital status:      Spouse name: Not on file    Number of children: Not on file    Years of education: Not on file    Highest education level: Not on file   Occupational History    Not on file   Tobacco Use    Smoking status: Former     Types: Pipe    Smokeless tobacco: Never    Tobacco comments:     Quit smoking: quit smoking pipe 1960s--- no cigs   Substance and Sexual Activity    Alcohol use: No    Drug use: No    Sexual activity: Not on file   Other Topics Concern    Not on file   Social History Narrative     and lives with wife. Retired. Social Determinants of Health     Financial Resource Strain: Not on file   Food Insecurity: Not on file   Transportation Needs: Not on file   Physical Activity: Not on file   Stress: Not on file   Social Connections: Not on file   Intimate Partner Violence: Not on file   Housing Stability: Not on file         Family History   Problem Relation Age of Onset    High Cholesterol Mother     Heart Disease Mother     Hypertension Brother     Heart Disease Brother     Cancer Father         lung cancer--    Coronary Art Dis Mother     Hypertension Mother          Allergies   Allergen Reactions    Amoxicillin-Pot Clavulanate Nausea And Vomiting     Severe abd cramping  Severe abd cramping    Oxycodone Other (See Comments)     Sever headaches, abdominal issues         Current Outpatient Medications   Medication Sig    amLODIPine (NORVASC) 5 MG tablet TAKE 1 TABLET BY MOUTH EVERY DAY    clopidogrel (PLAVIX) 75 MG tablet TAKE 1 TABLET BY MOUTH EVERY DAY    atorvastatin (LIPITOR) 40 MG tablet Take 1 tablet by mouth in the morning. TAKE 1 TABLET DAILY.     isosorbide mononitrate (IMDUR) 30 MG extended release tablet TAKE 1 TABLET DAILY FOR CHRONIC STABLE ANGINA PECTORIS    pantoprazole (PROTONIX) 40 MG tablet TAKE 1 TABLET BY MOUTH TWICE A DAY    acetaminophen (TYLENOL) 650 MG extended release tablet Take 650 mg by mouth every 6 hours as needed    aspirin 81 MG EC tablet Take 81 mg by mouth daily    guaiFENesin (MUCINEX) 600 MG extended release tablet Take 600 mg by mouth 2 times daily    hydroCHLOROthiazide (HYDRODIURIL) 25 MG tablet Take 25 mg by mouth daily    losartan (COZAAR) 100 MG tablet Take 100 mg by mouth    Melatonin 5 MG CAPS Take 5 mg by mouth    metoprolol succinate (TOPROL XL) 100 MG extended release tablet TAKE 1 TABLET ONCE DAILY    nitroGLYCERIN (NITROSTAT) 0.4 MG SL tablet Place 0.4 mg under the tongue    pramipexole (MIRAPEX) 0.5 MG tablet TAKE 1 TABLET NIGHTLY FOR RESTLESS LEGS SYNDROME No current facility-administered medications for this visit. Review of Systems        PHYSICAL EXAM:    Vitals:    11/16/22 1101   BP: (!) 140/80   Pulse: 75   Resp: 20   Temp: 98 °F (36.7 °C)   SpO2: 95%        GENERAL APPEARANCE:   The patient is fit gentleman, with normal weight and in no respiratory distress that looks younger than his stated age   [de-identified]:   PERRL. Conjunctivae unremarkable. Nasal mucosa is without epistaxis, exudate, or polyps. Gums and dentition are unremarkable. There is no oropharyngeal narrowing. TMs are clear. NECK/LYMPHATIC:   Symmetrical with no elevation of jugular venous pulsation. Trachea midline. No thyroid enlargement. No cervical adenopathy. LUNGS:   Normal respiratory effort with symmetrical lung expansion. Breath sounds clear to auscultation bilaterally with no rhonchi wheezing crackles noted. HEART:   There is a regular rate and rhythm. No murmur, rub, or gallop. There is no edema in the lower extremities. ABDOMEN:   Soft and non-tender. No hepatosplenomegaly. Bowel sounds are normal.     NEURO:   The patient is alert and oriented to person, place, and time. Memory appears intact and mood is normal.  No gross sensorimotor deficits are present. DIAGNOSTIC TESTS:      CXR:     Spirometry:  10.21.22        ASSESSMENT:  (Medical Decision Making)     Patient Active Problem List   Diagnosis    Arthritis of shoulder region, right, degenerative    Pericarditis with effusion    Pleural effusion, left    Coronary artery disease involving native coronary artery of native heart without angina pectoris    IBS (irritable bowel syndrome)    Allergic rhinitis    BPH (benign prostatic hyperplasia)    Hypercholesterolemia    Restless leg syndrome    Peptic ulcer disease    BEATRIS (obstructive sleep apnea)    Pleural effusion, right    Hypertension    Right hand pain    Right carpal tunnel syndrome           PLAN:  Encounter Diagnoses   Name Primary? Shortness of breath Yes    ILD (interstitial lung disease) (HCC)     Decreased diffusion capacity    The patient has mild symptoms of dyspnea on exertion with normal physical examination chest x-ray but abnormal complete pulmonary function testing with impaired diffusion capacity. Differential diagnosis is broad and includes early interstitial lung disease, congestive heart failure, he does have an elevated hemidiaphragm and atelectasis could cause symmetric decline in diffusion capacity, this is mild at 68% predicted and DLCO corrected for alveolar volume is normal at 88%. It is not clear the patient still has symptoms on exertion and I think we should proceed with evaluation of possible early interstitial lung disease. I doubt this however given his preserved residual volume. He will therefore have a high-resolution CT scan of the chest with inspiratory, expiratory, supine and prone imaging to look for signs of early interstitial lung disease in the form of interlobular septal thickening groundglass opacities etc. to explain his mild decrease in diffusion capacity. He will return to the office for follow-up in 1 month to discuss the results, if his CT scan is completely normal then we will see him on a yearly basis with repeat complete pulmonary function testing if his CT scan is however abnormal he may need bronchoscopy with BAL plus minus transbronchial biopsies depending on the findings. Total time spent 25 minutes    Orders Placed This Encounter   Procedures    CT CHEST HIGH RESOLUTION     Standing Status:   Future     Standing Expiration Date:   2023     Order Specific Question:   Reason for exam:     Answer:   SUPINE, PRONE, INSPIRE,          No orders of the defined types were placed in this encounter. Denisse Frazier MD  Dictated using voice recognition software.  Proofread, but unrecognized voice recognition errors may exist.

## 2022-11-25 ENCOUNTER — HOSPITAL ENCOUNTER (OUTPATIENT)
Dept: CT IMAGING | Age: 82
Discharge: HOME | End: 2022-11-28
Payer: MEDICARE

## 2022-11-25 DIAGNOSIS — J84.9 ILD (INTERSTITIAL LUNG DISEASE) (HCC): ICD-10-CM

## 2022-11-25 PROCEDURE — 71250 CT THORAX DX C-: CPT

## 2022-12-15 RX ORDER — PANTOPRAZOLE SODIUM 40 MG/1
TABLET, DELAYED RELEASE ORAL
Qty: 90 TABLET | Refills: 1 | Status: SHIPPED | OUTPATIENT
Start: 2022-12-15

## 2023-01-03 RX ORDER — AMLODIPINE BESYLATE 5 MG/1
TABLET ORAL
Qty: 90 TABLET | Refills: 3 | Status: SHIPPED | OUTPATIENT
Start: 2023-01-03

## 2023-01-30 ENCOUNTER — OFFICE VISIT (OUTPATIENT)
Dept: FAMILY MEDICINE CLINIC | Facility: CLINIC | Age: 83
End: 2023-01-30
Payer: MEDICARE

## 2023-01-30 VITALS
SYSTOLIC BLOOD PRESSURE: 122 MMHG | OXYGEN SATURATION: 94 % | HEART RATE: 65 BPM | BODY MASS INDEX: 27.68 KG/M2 | WEIGHT: 182.6 LBS | HEIGHT: 68 IN | DIASTOLIC BLOOD PRESSURE: 66 MMHG

## 2023-01-30 DIAGNOSIS — J84.9 ILD (INTERSTITIAL LUNG DISEASE) (HCC): ICD-10-CM

## 2023-01-30 DIAGNOSIS — I10 PRIMARY HYPERTENSION: ICD-10-CM

## 2023-01-30 DIAGNOSIS — M25.522 LEFT ELBOW PAIN: Primary | ICD-10-CM

## 2023-01-30 DIAGNOSIS — M77.02 MEDIAL EPICONDYLITIS OF LEFT ELBOW: ICD-10-CM

## 2023-01-30 PROCEDURE — G8417 CALC BMI ABV UP PARAM F/U: HCPCS | Performed by: FAMILY MEDICINE

## 2023-01-30 PROCEDURE — 3078F DIAST BP <80 MM HG: CPT | Performed by: FAMILY MEDICINE

## 2023-01-30 PROCEDURE — G8428 CUR MEDS NOT DOCUMENT: HCPCS | Performed by: FAMILY MEDICINE

## 2023-01-30 PROCEDURE — 1036F TOBACCO NON-USER: CPT | Performed by: FAMILY MEDICINE

## 2023-01-30 PROCEDURE — 1123F ACP DISCUSS/DSCN MKR DOCD: CPT | Performed by: FAMILY MEDICINE

## 2023-01-30 PROCEDURE — G8484 FLU IMMUNIZE NO ADMIN: HCPCS | Performed by: FAMILY MEDICINE

## 2023-01-30 PROCEDURE — 3074F SYST BP LT 130 MM HG: CPT | Performed by: FAMILY MEDICINE

## 2023-01-30 PROCEDURE — 99213 OFFICE O/P EST LOW 20 MIN: CPT | Performed by: FAMILY MEDICINE

## 2023-01-30 ASSESSMENT — PATIENT HEALTH QUESTIONNAIRE - PHQ9
SUM OF ALL RESPONSES TO PHQ9 QUESTIONS 1 & 2: 0
1. LITTLE INTEREST OR PLEASURE IN DOING THINGS: 0
2. FEELING DOWN, DEPRESSED OR HOPELESS: 0
SUM OF ALL RESPONSES TO PHQ QUESTIONS 1-9: 0

## 2023-01-30 ASSESSMENT — ANXIETY QUESTIONNAIRES
4. TROUBLE RELAXING: 0
1. FEELING NERVOUS, ANXIOUS, OR ON EDGE: 0
7. FEELING AFRAID AS IF SOMETHING AWFUL MIGHT HAPPEN: 0
GAD7 TOTAL SCORE: 0
2. NOT BEING ABLE TO STOP OR CONTROL WORRYING: 0
3. WORRYING TOO MUCH ABOUT DIFFERENT THINGS: 0
6. BECOMING EASILY ANNOYED OR IRRITABLE: 0
5. BEING SO RESTLESS THAT IT IS HARD TO SIT STILL: 0

## 2023-01-30 NOTE — PROGRESS NOTES
SUBJECTIVE:   Nalini Partida is a [de-identified] y.o. male who has a past medical history significant for hypertension, CAD, recurrent pericarditis, history of GI bleed, peptic ulcer disease, BPH, reflux and high cholesterol. At previous visit the patient has been complaining of persistent dyspnea on exertion with a negative cardiac work-up. He has been evaluated by pulmonary and apparently has been diagnosed with interstitial lung disease. Patient presents today complaining of about a 10-day history of left elbow pain. This began after lifting some weights at the gym. No direct or specific trauma reported. It is mainly located on the medial elbow. No swelling is reported. HPI  See above    Past Medical History, Past Surgical History, Family history, Social History, and Medications were all reviewed with the patient today and updated as necessary. Current Outpatient Medications   Medication Sig Dispense Refill    amLODIPine (NORVASC) 5 MG tablet TAKE 1 TABLET BY MOUTH EVERY DAY 90 tablet 3    pantoprazole (PROTONIX) 40 MG tablet TAKE 1 TABLET BY MOUTH DAILY TAKE 1 TABLET BY MOUTH TWICE A DAY 90 tablet 1    clopidogrel (PLAVIX) 75 MG tablet TAKE 1 TABLET BY MOUTH EVERY DAY 90 tablet 3    atorvastatin (LIPITOR) 40 MG tablet Take 1 tablet by mouth in the morning. TAKE 1 TABLET DAILY.  90 tablet 3    isosorbide mononitrate (IMDUR) 30 MG extended release tablet TAKE 1 TABLET DAILY FOR CHRONIC STABLE ANGINA PECTORIS 90 tablet 3    acetaminophen (TYLENOL) 650 MG extended release tablet Take 650 mg by mouth every 6 hours as needed      aspirin 81 MG EC tablet Take 81 mg by mouth daily      guaiFENesin (MUCINEX) 600 MG extended release tablet Take 600 mg by mouth 2 times daily      hydroCHLOROthiazide (HYDRODIURIL) 25 MG tablet Take 25 mg by mouth daily      losartan (COZAAR) 100 MG tablet Take 100 mg by mouth      Melatonin 5 MG CAPS Take 5 mg by mouth      metoprolol succinate (TOPROL XL) 100 MG extended release tablet TAKE 1 TABLET ONCE DAILY      nitroGLYCERIN (NITROSTAT) 0.4 MG SL tablet Place 0.4 mg under the tongue      pramipexole (MIRAPEX) 0.5 MG tablet TAKE 1 TABLET NIGHTLY FOR RESTLESS LEGS SYNDROME       No current facility-administered medications for this visit. Allergies   Allergen Reactions    Amoxicillin-Pot Clavulanate Nausea And Vomiting     Severe abd cramping  Severe abd cramping    Oxycodone Other (See Comments)     Sever headaches, abdominal issues     Patient Active Problem List   Diagnosis    Arthritis of shoulder region, right, degenerative    Pericarditis with effusion    Pleural effusion, left    Coronary artery disease involving native coronary artery of native heart without angina pectoris    IBS (irritable bowel syndrome)    Allergic rhinitis    BPH (benign prostatic hyperplasia)    Hypercholesterolemia    Restless leg syndrome    Peptic ulcer disease    BEATRIS (obstructive sleep apnea)    Pleural effusion, right    Hypertension    Right hand pain    Right carpal tunnel syndrome    ILD (interstitial lung disease) ([facility]     Past Medical History:   Diagnosis Date    Acute respiratory failure ([facility] 08/29/2018    LLL pneumonia with bilateral pleural effusions- admission [place]    Allergic rhinitis     Anemia     Aortic insufficiency     Aortic sclerosis     no stenosis- ECHO 9/8/14  LVEF 50-55%    BPH with urinary obstruction     resolved with surgery    CAD (coronary artery disease)     stent X1 in 2010. .managed with medications    Chronic pain     right shoulder    Colon polyps     Depression     Diverticulitis     Dyslipidemia     Dyslipidemia     managed with medications    Former pipe smoker     stopped at age 30    GERD (gastroesophageal reflux disease)     managed with PRN medications    GI bleed     from Plavix    Heart murmur     History of depression     Hypercholesterolemia     managed with meds    Hypertension     controlled w/med    IBS (irritable bowel syndrome)     manged with medications    Ill-defined condition     pericarditis    Macular degeneration     managed with medications    Macular degeneration     Non-STEMI (non-ST elevated myocardial infarction) ([facility] 2015    Old MI (myocardial infarction)     x2; last 2014    Osteoarthritis     in shoulders and back    Pleural effusion     PUD (peptic ulcer disease) 3/2011    no recent episodes    PUD (peptic ulcer disease)     Restless leg syndrome     manged with medications    Seasonal allergic rhinitis     PRN medications    Unspecified sleep apnea     wears cpap     Past Surgical History:   Procedure Laterality Date    ADENOIDECTOMY      CARDIAC CATHETERIZATION  2014    no stent required    CARDIAC CATHETERIZATION  , 3/2011,2011    (1)stent 2010     CATARACT REMOVAL Bilateral 2009    COLONOSCOPY      SHOULDER ARTHROPLASTY Right 2015    SHOULDER ARTHROSCOPY Right 2010 approx    SHOULDER ARTHROSCOPY Left 2017    TONSILLECTOMY AND ADENOIDECTOMY      as a child    TURP  05/15; 07/15     Family History   Problem Relation Age of Onset    High Cholesterol Mother     Heart Disease Mother     Hypertension Brother     Heart Disease Brother     Cancer Father         lung cancer--    Coronary Art Dis Mother     Hypertension Mother      Social History     Tobacco Use    Smoking status: Former     Types: Pipe    Smokeless tobacco: Never    Tobacco comments:     Quit smoking: quit smoking pipe --- no cigs   Substance Use Topics    Alcohol use: No         Review of Systems  See above    OBJECTIVE:  /66   Pulse 65   Ht 5' 8\" (1.727 m)   Wt 182 lb 9.6 oz (82.8 kg)   SpO2 94%   BMI 27.76 kg/m²      Physical Exam  Constitutional:       General: He is not in acute distress. Appearance: Normal appearance. He is not ill-appearing. HENT:      Head: Normocephalic and atraumatic. Cardiovascular:      Rate and Rhythm: Normal rate and regular rhythm. Heart sounds: Normal heart sounds. No murmur heard.   Pulmonary: Effort: Pulmonary effort is normal.      Breath sounds: Normal breath sounds. No wheezing or rhonchi. Musculoskeletal:         General: Normal range of motion. Cervical back: Normal range of motion and neck supple. Right lower leg: No edema. Left lower leg: No edema. Comments: Emanation of the patient's left elbow reveals palpable discomfort on the medial upper condyle. Full range of motion is noted. No gross deformity or swelling is noted. Skin:     General: Skin is warm. Findings: No rash. Neurological:      Mental Status: He is alert and oriented to person, place, and time. Psychiatric:         Mood and Affect: Mood normal.         Behavior: Behavior normal.         Thought Content: Thought content normal.         Judgment: Judgment normal.       Medical problems and test results were reviewed with the patient today. ASSESSMENT and PLAN    1. Left elbow pain. Underlying epicondylitis. 2.  Epicondylitis left elbow. Treat with ice, rest and tennis elbow strap. May apply some Voltaren gel if needed. Return for injection if symptoms not improved in a week. 3.  Interstitial lung disease. Has follow-up next week to go over recent testing results and discussion with pulmonary. 4.  Hypertension. /66. Elements of this note have been dictated using speech recognition software. As a result, errors of speech recognition may have occurred.

## 2023-02-01 NOTE — PROGRESS NOTES
[facility]  [address]  [place] [address]  [phone]    Patient Name:  Aren Hinojosa  YOB: 1940      Office Visit 2/3/2023    CHIEF COMPLAINT:    Chief Complaint   Patient presents with    Follow-up    Shortness of Breath           HISTORY OF PRESENT ILLNESS:    Previously seen by:  Robert Mayorga MD on 10/21/22  This is a delightful 82-year-old white male that looks younger than his age very active person who presents for evaluation of dyspnea on exertion since around May of this year.  He does not remember any inciting factors he does notice that with the usual exertion that he is used to he can perform the task but it is resulting in him having dyspnea.  He had a cardiac evaluation which was completely normal apparently and he is here for further evaluation to see whether there is any underlying pulmonary disease to cause this phenomenon.  He is aware of an elevated right hemidiaphragm for at least 8 years, he has had no recent infections that he can speak off and he does not remember any triggers around May to cause him to have sudden onset of problems.  He denies any fevers, chills, coughing, wheezing or chest pain.  As I mentioned he still able to perform the tasks go to the gym and exert himself but he feels that he is more short of breath than usual when he does these things.  He denies any weight gain he denies any weight loss, he has obstructive sleep apnea and is compliant with CPAP therapy.  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  He has no exposure to birds.  PLAN:  Encounter Diagnosis   Name Primary?    Shortness of  breath Yes   The patient symptoms are nonspecific and subjective. He had a normal cardiac evaluation, he has normal pulmonary function testing, and normal chest x-ray and a normal physical examination. He does have an elevated right hemidiaphragm but this is a chronic condition for at least 8 years as far as he knows and that should not explain sudden increase in dyspnea on exertion and may of this year. It may well be that this is related to his age rather than any specific problem and that he is trying to exert himself at a higher level than his body can allow him without increased dyspnea but I think he is also in the right age group to try to exclude interstitial lung disease and I discussed this with him at length. He has no risk factors for hypersensitivity pneumonitis no symptoms or signs of sarcoidosis or other rheumatological disorders to explain pulmonary fibrosis moreover his physical exam is very normal.  Therefore complete pulmonary function will be performed with diffusion capacity assessment if abnormal if there is any hint of early interstitial lung disease decreased diffusion capacity decreased residual volume then we will proceed with a high-resolution CT scan of the chest with prone, supine, inspiratory and expiratory imaging to try to exclude that possibility. If confirmed he may need bronchoscopy with BAL and further basic evaluation for interstitial lung disease but I suspect if that is uncovered it would be early IPF rather than anything else and he would be a candidate for antifibrotic therapy. At this point in time however there is really no evidence of anything and we will start with complete pulmonary function testing in order to determine whether further testing is needed. The patient will return to the office for follow-up in 1 month to discuss the results of the complete pulmonary function testing and whether we need to do any further evaluation.   I encouraged the patient to continue with being physically active I explained to him the phenomenon of physical deconditioning at his age with lack of activity I just instructed him to decrease the intensity of exertion in order to accommodate for the shortness of breath that he is subjectively experiencing.  Any questions asked were answered seemingly to his and his wife satisfaction and to the best of my knowledge.  Patient will return to the office for follow-up in 1 month      November 16, 2022:    The patient continues to have mild symptoms of shortness of breath on exertion, he had complete pulmonary function testing which revealed normal spirometry and lung volume analysis however did show a mild decline in diffusion capacity of 68% predicted he denies any cough, shortness of breath more than his usual that he presented with initially, hemoptysis, unintentional weight loss.    PLAN:  Encounter Diagnoses   Name Primary?    Shortness of breath Yes    Decreased diffusion capacity      The patient has mild symptoms of dyspnea on exertion with normal physical examination chest x-ray but abnormal complete pulmonary function testing with impaired diffusion capacity.  Differential diagnosis is broad and includes early interstitial lung disease, congestive heart failure, he does have an elevated hemidiaphragm and atelectasis could cause symmetric decline in diffusion capacity, this is mild at 68% predicted and DLCO corrected for alveolar volume is normal at 88%.  It is not clear the patient still has symptoms on exertion and I think we should proceed with evaluation of possible early interstitial lung disease.  I doubt this however given his preserved residual volume.  He will therefore have a high-resolution CT scan of the chest with inspiratory, expiratory, supine and prone imaging to look for signs of early interstitial lung disease in the form of interlobular septal thickening groundglass opacities etc. to explain his mild decrease in  diffusion capacity. He will return to the office for follow-up in 1 month to discuss the results, if his CT scan is completely normal then we will see him on a yearly basis with repeat complete pulmonary function testing if his CT scan is however abnormal he may need bronchoscopy with BAL plus minus transbronchial biopsies depending on the findings. Past Medical History:   Diagnosis Date    Acute respiratory failure ([facility] 08/29/2018    LLL pneumonia with bilateral pleural effusions- admission [facility]    Allergic rhinitis     Anemia     Aortic insufficiency     Aortic sclerosis     no stenosis- ECHO 9/8/14  LVEF 50-55%    BPH with urinary obstruction     resolved with surgery    CAD (coronary artery disease)     stent X1 in 2010. .managed with medications    Chronic pain     right shoulder    Colon polyps     Depression     Diverticulitis     Dyslipidemia     Dyslipidemia     managed with medications    Former pipe smoker     stopped at age 30    GERD (gastroesophageal reflux disease)     managed with PRN medications    GI bleed     from Plavix    Heart murmur     History of depression     Hypercholesterolemia     managed with meds    Hypertension     controlled w/med    IBS (irritable bowel syndrome)     manged with medications    Ill-defined condition     pericarditis    Macular degeneration     managed with medications    Macular degeneration     Non-STEMI (non-ST elevated myocardial infarction) ([facility] 09/2015    Old MI (myocardial infarction)     x2; last 9/2014    Osteoarthritis     in shoulders and back    Pleural effusion     PUD (peptic ulcer disease) 3/2011    no recent episodes    PUD (peptic ulcer disease)     Restless leg syndrome     manged with medications    Seasonal allergic rhinitis     PRN medications    Unspecified sleep apnea     wears cpap         Patient Active Problem List   Diagnosis    Arthritis of shoulder region, right, degenerative    Pericarditis with effusion    Pleural effusion, left    Coronary artery disease involving native coronary artery of native heart without angina pectoris    IBS (irritable bowel syndrome)    Allergic rhinitis    BPH (benign prostatic hyperplasia)    Hypercholesterolemia    Restless leg syndrome    Peptic ulcer disease    BEATRIS (obstructive sleep apnea)    Pleural effusion, right    Hypertension    Right hand pain    Right carpal tunnel syndrome    ILD (interstitial lung disease) ([facility] [address]           Past Surgical History:   Procedure Laterality Date    ADENOIDECTOMY      CARDIAC CATHETERIZATION  2014    no stent required    CARDIAC CATHETERIZATION  , 3/2011,2011    (1)stent 2010     CATARACT REMOVAL Bilateral 2009    COLONOSCOPY      SHOULDER ARTHROPLASTY Right 2015    SHOULDER ARTHROSCOPY Right 2010 approx    SHOULDER ARTHROSCOPY Left 2017    TONSILLECTOMY AND ADENOIDECTOMY      as a child    TURP  05/15; 07/15         Social History     Socioeconomic History    Marital status:      Spouse name: Not on file    Number of children: Not on file    Years of education: Not on file    Highest education level: Not on file   Occupational History    Not on file   Tobacco Use    Smoking status: Former     Types: Pipe     Quit date:      Years since quittin.1    Smokeless tobacco: Never    Tobacco comments:     Quit smoking: quit smoking pipe --- no cigs   Substance and Sexual Activity    Alcohol use: No    Drug use: No    Sexual activity: Not on file   Other Topics Concern    Not on file   Social History Narrative     and lives with wife. Retired.      Social Determinants of Health     Financial Resource Strain: Not on file   Food Insecurity: Not on file   Transportation Needs: Not on file   Physical Activity: Not on file   Stress: Not on file   Social Connections: Not on file   Intimate Partner Violence: Not on file   Housing Stability: Not on file         Family History   Problem Relation Age of Onset    High Cholesterol Mother     Heart Disease Mother     Hypertension Brother     Heart Disease Brother     Cancer Father         lung cancer--    Coronary Art Dis Mother     Hypertension Mother          Allergies   Allergen Reactions    Amoxicillin-Pot Clavulanate Nausea And Vomiting     Severe abd cramping  Severe abd cramping    Oxycodone Other (See Comments)     Sever headaches, abdominal issues         Current Outpatient Medications   Medication Sig    Cholecalciferol (VITAMIN D3) 50 MCG ( UT) CAPS Take by mouth    amLODIPine (NORVASC) 5 MG tablet TAKE 1 TABLET BY MOUTH EVERY DAY    pantoprazole (PROTONIX) 40 MG tablet TAKE 1 TABLET BY MOUTH DAILY TAKE 1 TABLET BY MOUTH TWICE A DAY    clopidogrel (PLAVIX) 75 MG tablet TAKE 1 TABLET BY MOUTH EVERY DAY    atorvastatin (LIPITOR) 40 MG tablet Take 1 tablet by mouth in the morning. TAKE 1 TABLET DAILY. isosorbide mononitrate (IMDUR) 30 MG extended release tablet TAKE 1 TABLET DAILY FOR CHRONIC STABLE ANGINA PECTORIS    acetaminophen (TYLENOL) 650 MG extended release tablet Take 650 mg by mouth every 6 hours as needed    aspirin 81 MG EC tablet Take 81 mg by mouth daily    guaiFENesin (MUCINEX) 600 MG extended release tablet Take 600 mg by mouth 2 times daily    hydroCHLOROthiazide (HYDRODIURIL) 25 MG tablet Take 25 mg by mouth daily    losartan (COZAAR) 100 MG tablet Take 100 mg by mouth    Melatonin 5 MG CAPS Take 5 mg by mouth    metoprolol succinate (TOPROL XL) 100 MG extended release tablet TAKE 1 TABLET ONCE DAILY    nitroGLYCERIN (NITROSTAT) 0.4 MG SL tablet Place 0.4 mg under the tongue    pramipexole (MIRAPEX) 0.5 MG tablet TAKE 1 TABLET NIGHTLY FOR RESTLESS LEGS SYNDROME     No current facility-administered medications for this visit.            Review of Systems        PHYSICAL EXAM:    Vitals:    23 1253   BP: 124/64   Pulse: 68   Resp: 14   Temp: 98.1 °F (36.7 °C)   SpO2: 96%          GENERAL APPEARANCE:   The patient is fit gentleman, with normal weight and in no respiratory distress that looks younger than his stated age   [de-identified]:   PERRL. Conjunctivae unremarkable. Nasal mucosa is without epistaxis, exudate, or polyps. Gums and dentition are unremarkable. There is no oropharyngeal narrowing. TMs are clear. NECK/LYMPHATIC:   Symmetrical with no elevation of jugular venous pulsation. Trachea midline. No thyroid enlargement. No cervical adenopathy. LUNGS:   Normal respiratory effort with symmetrical lung expansion. Breath sounds clear to auscultation bilaterally with no rhonchi wheezing crackles noted. HEART:   There is a regular rate and rhythm. No murmur, rub, or gallop. There is no edema in the lower extremities. ABDOMEN:   Soft and non-tender. No hepatosplenomegaly. Bowel sounds are normal.     NEURO:   The patient is alert and oriented to person, place, and time. Memory appears intact and mood is normal.  No gross sensorimotor deficits are present. DIAGNOSTIC TESTS:      CXR:     Spirometry:  10.21.22        ASSESSMENT:  (Medical Decision Making)     Patient Active Problem List   Diagnosis    Arthritis of shoulder region, right, degenerative    Pericarditis with effusion    Pleural effusion, left    Coronary artery disease involving native coronary artery of native heart without angina pectoris    IBS (irritable bowel syndrome)    Allergic rhinitis    BPH (benign prostatic hyperplasia)    Hypercholesterolemia    Restless leg syndrome    Peptic ulcer disease    BEATRIS (obstructive sleep apnea)    Pleural effusion, right    Hypertension    Right hand pain    Right carpal tunnel syndrome    ILD (interstitial lung disease) ([facility] [address]           PLAN:  Encounter Diagnoses   Name Primary? Shortness of breath Yes    Decreased diffusion capacity     Atelectasis     Paralyzed hemidiaphragm      The patient had high-resolution CT scanning of the chest which virtually excludes interstitial lung disease at this point.   He has an elevated right hemidiaphragm and associated atelectasis. He has a very minor decrease in diffusion capacity by 2% from normal at 68% predicted and this could easily be explained with the atelectasis associated with the paralyzed right hemidiaphragm as evidenced by an alveolar volume adjusted diffusion capacity which is normal.  I discussed this with the patient at length. He still able to walk a mile and a half every day on a treadmill with inclines he just feels a little bit more winded. I think his expectations of his exertional capacity at this age exceed his capability and I tried to explain that to him. I see no reason to conduct any further investigation in this regard. As I said the isolated diffusion capacity decline could be easily explained by the patient's atelectasis related to his paralyzed hemidiaphragm. He does not have orthopnea and is still very physically active at this age. I encouraged the patient to maintain his physical activity, I suggested that he decrease the intensity on the treadmill but still walk the distances that he is accustomed to and I would like to see him in a years time with repeat complete pulmonary function testing and diffusion capacity. His residual volume is completely preserved further adding credence to the fact that he does not have interstitial lung disease. This diagnosis will be removed from the patient's record as he does not have interstitial lung disease. No orders of the defined types were placed in this encounter. No orders of the defined types were placed in this encounter. Job Cabrera MD  Dictated using voice recognition software.  Proofread, but unrecognized voice recognition errors may exist.

## 2023-02-03 ENCOUNTER — OFFICE VISIT (OUTPATIENT)
Dept: PULMONOLOGY | Age: 83
End: 2023-02-03
Payer: MEDICARE

## 2023-02-03 VITALS
WEIGHT: 182.2 LBS | TEMPERATURE: 98.1 F | RESPIRATION RATE: 14 BRPM | DIASTOLIC BLOOD PRESSURE: 64 MMHG | BODY MASS INDEX: 27.61 KG/M2 | HEART RATE: 68 BPM | HEIGHT: 68 IN | SYSTOLIC BLOOD PRESSURE: 124 MMHG | OXYGEN SATURATION: 96 %

## 2023-02-03 DIAGNOSIS — R94.2 DECREASED DIFFUSION CAPACITY: ICD-10-CM

## 2023-02-03 DIAGNOSIS — R06.02 SHORTNESS OF BREATH: Primary | ICD-10-CM

## 2023-02-03 DIAGNOSIS — J98.11 ATELECTASIS: ICD-10-CM

## 2023-02-03 DIAGNOSIS — J98.6 PARALYZED HEMIDIAPHRAGM: ICD-10-CM

## 2023-02-03 PROBLEM — J84.9 ILD (INTERSTITIAL LUNG DISEASE) (HCC): Status: RESOLVED | Noted: 2023-01-30 | Resolved: 2023-02-03

## 2023-02-03 PROCEDURE — G8484 FLU IMMUNIZE NO ADMIN: HCPCS | Performed by: INTERNAL MEDICINE

## 2023-02-03 PROCEDURE — 1123F ACP DISCUSS/DSCN MKR DOCD: CPT | Performed by: INTERNAL MEDICINE

## 2023-02-03 PROCEDURE — 1036F TOBACCO NON-USER: CPT | Performed by: INTERNAL MEDICINE

## 2023-02-03 PROCEDURE — 3078F DIAST BP <80 MM HG: CPT | Performed by: INTERNAL MEDICINE

## 2023-02-03 PROCEDURE — 3074F SYST BP LT 130 MM HG: CPT | Performed by: INTERNAL MEDICINE

## 2023-02-03 PROCEDURE — 99214 OFFICE O/P EST MOD 30 MIN: CPT | Performed by: INTERNAL MEDICINE

## 2023-02-03 PROCEDURE — G8427 DOCREV CUR MEDS BY ELIG CLIN: HCPCS | Performed by: INTERNAL MEDICINE

## 2023-02-03 PROCEDURE — G8417 CALC BMI ABV UP PARAM F/U: HCPCS | Performed by: INTERNAL MEDICINE

## 2023-02-03 RX ORDER — ACETAMINOPHEN 160 MG
TABLET,DISINTEGRATING ORAL
COMMUNITY

## 2023-02-12 SDOH — HEALTH STABILITY: PHYSICAL HEALTH: ON AVERAGE, HOW MANY MINUTES DO YOU ENGAGE IN EXERCISE AT THIS LEVEL?: 90 MIN

## 2023-02-12 SDOH — ECONOMIC STABILITY: FOOD INSECURITY: WITHIN THE PAST 12 MONTHS, YOU WORRIED THAT YOUR FOOD WOULD RUN OUT BEFORE YOU GOT MONEY TO BUY MORE.: NEVER TRUE

## 2023-02-12 SDOH — ECONOMIC STABILITY: INCOME INSECURITY: HOW HARD IS IT FOR YOU TO PAY FOR THE VERY BASICS LIKE FOOD, HOUSING, MEDICAL CARE, AND HEATING?: NOT HARD AT ALL

## 2023-02-12 SDOH — ECONOMIC STABILITY: TRANSPORTATION INSECURITY
IN THE PAST 12 MONTHS, HAS LACK OF TRANSPORTATION KEPT YOU FROM MEETINGS, WORK, OR FROM GETTING THINGS NEEDED FOR DAILY LIVING?: NO

## 2023-02-12 SDOH — HEALTH STABILITY: PHYSICAL HEALTH: ON AVERAGE, HOW MANY DAYS PER WEEK DO YOU ENGAGE IN MODERATE TO STRENUOUS EXERCISE (LIKE A BRISK WALK)?: 4 DAYS

## 2023-02-12 SDOH — ECONOMIC STABILITY: HOUSING INSECURITY
IN THE LAST 12 MONTHS, WAS THERE A TIME WHEN YOU DID NOT HAVE A STEADY PLACE TO SLEEP OR SLEPT IN A SHELTER (INCLUDING NOW)?: NO

## 2023-02-12 SDOH — ECONOMIC STABILITY: FOOD INSECURITY: WITHIN THE PAST 12 MONTHS, THE FOOD YOU BOUGHT JUST DIDN'T LAST AND YOU DIDN'T HAVE MONEY TO GET MORE.: NEVER TRUE

## 2023-02-12 ASSESSMENT — LIFESTYLE VARIABLES
HOW OFTEN DO YOU HAVE SIX OR MORE DRINKS ON ONE OCCASION: 1
HOW OFTEN DO YOU HAVE A DRINK CONTAINING ALCOHOL: 1
HOW MANY STANDARD DRINKS CONTAINING ALCOHOL DO YOU HAVE ON A TYPICAL DAY: 0
HOW OFTEN DO YOU HAVE A DRINK CONTAINING ALCOHOL: NEVER
HOW MANY STANDARD DRINKS CONTAINING ALCOHOL DO YOU HAVE ON A TYPICAL DAY: PATIENT DOES NOT DRINK

## 2023-02-12 ASSESSMENT — PATIENT HEALTH QUESTIONNAIRE - PHQ9
SUM OF ALL RESPONSES TO PHQ QUESTIONS 1-9: 0
1. LITTLE INTEREST OR PLEASURE IN DOING THINGS: 0
SUM OF ALL RESPONSES TO PHQ9 QUESTIONS 1 & 2: 0
SUM OF ALL RESPONSES TO PHQ QUESTIONS 1-9: 0
SUM OF ALL RESPONSES TO PHQ QUESTIONS 1-9: 0
2. FEELING DOWN, DEPRESSED OR HOPELESS: 0
SUM OF ALL RESPONSES TO PHQ QUESTIONS 1-9: 0

## 2023-02-13 ENCOUNTER — OFFICE VISIT (OUTPATIENT)
Dept: FAMILY MEDICINE CLINIC | Facility: CLINIC | Age: 83
End: 2023-02-13
Payer: MEDICARE

## 2023-02-13 VITALS
BODY MASS INDEX: 27.43 KG/M2 | WEIGHT: 181 LBS | HEIGHT: 68 IN | DIASTOLIC BLOOD PRESSURE: 60 MMHG | HEART RATE: 58 BPM | OXYGEN SATURATION: 95 % | SYSTOLIC BLOOD PRESSURE: 122 MMHG

## 2023-02-13 DIAGNOSIS — I10 PRIMARY HYPERTENSION: ICD-10-CM

## 2023-02-13 DIAGNOSIS — I25.10 CORONARY ARTERY DISEASE INVOLVING NATIVE CORONARY ARTERY OF NATIVE HEART WITHOUT ANGINA PECTORIS: ICD-10-CM

## 2023-02-13 DIAGNOSIS — K21.9 GERD WITHOUT ESOPHAGITIS: ICD-10-CM

## 2023-02-13 DIAGNOSIS — E78.00 PURE HYPERCHOLESTEROLEMIA: ICD-10-CM

## 2023-02-13 DIAGNOSIS — Z00.00 MEDICARE ANNUAL WELLNESS VISIT, SUBSEQUENT: Primary | ICD-10-CM

## 2023-02-13 DIAGNOSIS — I31.9 PERICARDITIS, UNSPECIFIED CHRONICITY, UNSPECIFIED TYPE: ICD-10-CM

## 2023-02-13 DIAGNOSIS — R06.09 DOE (DYSPNEA ON EXERTION): ICD-10-CM

## 2023-02-13 LAB
ALBUMIN SERPL-MCNC: 3.8 G/DL (ref 3.2–4.6)
ALBUMIN/GLOB SERPL: 1.3 (ref 0.4–1.6)
ALP SERPL-CCNC: 94 U/L (ref 50–136)
ALT SERPL-CCNC: 40 U/L (ref 12–65)
ANION GAP SERPL CALC-SCNC: 4 MMOL/L (ref 2–11)
AST SERPL-CCNC: 32 U/L (ref 15–37)
BASOPHILS # BLD: 0.1 K/UL (ref 0–0.2)
BASOPHILS NFR BLD: 1 % (ref 0–2)
BILIRUB SERPL-MCNC: 1.9 MG/DL (ref 0.2–1.1)
BUN SERPL-MCNC: 13 MG/DL (ref 8–23)
CALCIUM SERPL-MCNC: 9 MG/DL (ref 8.3–10.4)
CHLORIDE SERPL-SCNC: 100 MMOL/L (ref 101–110)
CHOLEST SERPL-MCNC: 125 MG/DL
CO2 SERPL-SCNC: 34 MMOL/L (ref 21–32)
CREAT SERPL-MCNC: 0.9 MG/DL (ref 0.8–1.5)
DIFFERENTIAL METHOD BLD: NORMAL
EOSINOPHIL # BLD: 0.1 K/UL (ref 0–0.8)
EOSINOPHIL NFR BLD: 2 % (ref 0.5–7.8)
ERYTHROCYTE [DISTWIDTH] IN BLOOD BY AUTOMATED COUNT: 12.6 % (ref 11.9–14.6)
GLOBULIN SER CALC-MCNC: 3 G/DL (ref 2.8–4.5)
GLUCOSE SERPL-MCNC: 102 MG/DL (ref 65–100)
HCT VFR BLD AUTO: 43.7 % (ref 41.1–50.3)
HDLC SERPL-MCNC: 59 MG/DL (ref 40–60)
HDLC SERPL: 2.1
HGB BLD-MCNC: 14.1 G/DL (ref 13.6–17.2)
IMM GRANULOCYTES # BLD AUTO: 0 K/UL (ref 0–0.5)
IMM GRANULOCYTES NFR BLD AUTO: 0 % (ref 0–5)
LDLC SERPL CALC-MCNC: 54.4 MG/DL
LYMPHOCYTES # BLD: 2.1 K/UL (ref 0.5–4.6)
LYMPHOCYTES NFR BLD: 30 % (ref 13–44)
MCH RBC QN AUTO: 30.7 PG (ref 26.1–32.9)
MCHC RBC AUTO-ENTMCNC: 32.3 G/DL (ref 31.4–35)
MCV RBC AUTO: 95.2 FL (ref 82–102)
MONOCYTES # BLD: 0.6 K/UL (ref 0.1–1.3)
MONOCYTES NFR BLD: 9 % (ref 4–12)
NEUTS SEG # BLD: 4 K/UL (ref 1.7–8.2)
NEUTS SEG NFR BLD: 58 % (ref 43–78)
NRBC # BLD: 0 K/UL (ref 0–0.2)
PLATELET # BLD AUTO: 236 K/UL (ref 150–450)
PMV BLD AUTO: 10.3 FL (ref 9.4–12.3)
POTASSIUM SERPL-SCNC: 3.8 MMOL/L (ref 3.5–5.1)
PROT SERPL-MCNC: 6.8 G/DL (ref 6.3–8.2)
RBC # BLD AUTO: 4.59 M/UL (ref 4.23–5.6)
SODIUM SERPL-SCNC: 138 MMOL/L (ref 133–143)
TRIGL SERPL-MCNC: 58 MG/DL (ref 35–150)
TSH, 3RD GENERATION: 2.75 UIU/ML (ref 0.36–3.74)
VLDLC SERPL CALC-MCNC: 11.6 MG/DL (ref 6–23)
WBC # BLD AUTO: 6.8 K/UL (ref 4.3–11.1)

## 2023-02-13 PROCEDURE — G8484 FLU IMMUNIZE NO ADMIN: HCPCS | Performed by: FAMILY MEDICINE

## 2023-02-13 PROCEDURE — 3078F DIAST BP <80 MM HG: CPT | Performed by: FAMILY MEDICINE

## 2023-02-13 PROCEDURE — 3074F SYST BP LT 130 MM HG: CPT | Performed by: FAMILY MEDICINE

## 2023-02-13 PROCEDURE — G0439 PPPS, SUBSEQ VISIT: HCPCS | Performed by: FAMILY MEDICINE

## 2023-02-13 PROCEDURE — 1123F ACP DISCUSS/DSCN MKR DOCD: CPT | Performed by: FAMILY MEDICINE

## 2023-02-13 NOTE — PROGRESS NOTES
SUBJECTIVE:   Ulisses Ridcheri is a [de-identified] y.o. male who has a past medical history significant for hypertension, CAD, recurrent pericarditis, history of GI bleed, peptic ulcer disease, BPH, reflux and high cholesterol. Patient has been worked up for dyspnea on exertion with negative cardiac evaluation. He was ultimately seen by pulmonary and initially diagnosed with possible interstitial lung disease. However the patient reports that this diagnosis has been taken off his record and he states that the pulmonologist feels like his lung capacity is normal considering the fact that he has a chronically elevated right hemidiaphragm. He states that his breathing is stable and he has no complaints today. He denies chest pain, shortness of breath, orthopnea or PND. HPI  See above    Past Medical History, Past Surgical History, Family history, Social History, and Medications were all reviewed with the patient today and updated as necessary. Current Outpatient Medications   Medication Sig Dispense Refill    Cholecalciferol (VITAMIN D3) 50 MCG (2000 UT) CAPS Take by mouth      amLODIPine (NORVASC) 5 MG tablet TAKE 1 TABLET BY MOUTH EVERY DAY 90 tablet 3    pantoprazole (PROTONIX) 40 MG tablet TAKE 1 TABLET BY MOUTH DAILY TAKE 1 TABLET BY MOUTH TWICE A DAY 90 tablet 1    clopidogrel (PLAVIX) 75 MG tablet TAKE 1 TABLET BY MOUTH EVERY DAY 90 tablet 3    atorvastatin (LIPITOR) 40 MG tablet Take 1 tablet by mouth in the morning. TAKE 1 TABLET DAILY.  90 tablet 3    isosorbide mononitrate (IMDUR) 30 MG extended release tablet TAKE 1 TABLET DAILY FOR CHRONIC STABLE ANGINA PECTORIS 90 tablet 3    acetaminophen (TYLENOL) 650 MG extended release tablet Take 650 mg by mouth every 6 hours as needed      aspirin 81 MG EC tablet Take 81 mg by mouth daily      guaiFENesin (MUCINEX) 600 MG extended release tablet Take 600 mg by mouth 2 times daily      hydroCHLOROthiazide (HYDRODIURIL) 25 MG tablet Take 25 mg by mouth daily losartan (COZAAR) 100 MG tablet Take 100 mg by mouth      Melatonin 5 MG CAPS Take 5 mg by mouth      metoprolol succinate (TOPROL XL) 100 MG extended release tablet TAKE 1 TABLET ONCE DAILY      nitroGLYCERIN (NITROSTAT) 0.4 MG SL tablet Place 0.4 mg under the tongue      pramipexole (MIRAPEX) 0.5 MG tablet TAKE 1 TABLET NIGHTLY FOR RESTLESS LEGS SYNDROME       No current facility-administered medications for this visit. Allergies   Allergen Reactions    Amoxicillin-Pot Clavulanate Nausea And Vomiting     Severe abd cramping  Severe abd cramping    Oxycodone Other (See Comments)     Sever headaches, abdominal issues     Patient Active Problem List   Diagnosis    Arthritis of shoulder region, right, degenerative    Pericarditis with effusion    Pleural effusion, left    Coronary artery disease involving native coronary artery of native heart without angina pectoris    IBS (irritable bowel syndrome)    Allergic rhinitis    BPH (benign prostatic hyperplasia)    Hypercholesterolemia    Restless leg syndrome    Peptic ulcer disease    BEATRIS (obstructive sleep apnea)    Pleural effusion, right    Hypertension    Right hand pain    Right carpal tunnel syndrome    Interstitial lung disease ([facility] [address]     Past Medical History:   Diagnosis Date    Acute respiratory failure ([address] 08/29/2018    LLL pneumonia with bilateral pleural effusions- admission [facility]    Allergic rhinitis     Anemia     Aortic insufficiency     Aortic sclerosis     no stenosis- ECHO 9/8/14  LVEF 50-55%    BPH with urinary obstruction     resolved with surgery    CAD (coronary artery disease)     stent X1 in 2010. .managed with medications    Chronic pain     right shoulder    Colon polyps     Depression     Diverticulitis     Dyslipidemia     Dyslipidemia     managed with medications    Former pipe smoker     stopped at age 30    GERD (gastroesophageal reflux disease)     managed with PRN medications    GI bleed     from Plavix    Heart murmur History of depression     Hypercholesterolemia     managed with meds    Hypertension     controlled w/med    IBS (irritable bowel syndrome)     manged with medications    Ill-defined condition     pericarditis    Macular degeneration     managed with medications    Macular degeneration     Non-STEMI (non-ST elevated myocardial infarction) ([facility] [address] 2015    Old MI (myocardial infarction)     x2; last 2014    Osteoarthritis     in shoulders and back    Pleural effusion     PUD (peptic ulcer disease) 3/2011    no recent episodes    PUD (peptic ulcer disease)     Restless leg syndrome     manged with medications    Seasonal allergic rhinitis     PRN medications    Unspecified sleep apnea     wears cpap     Past Surgical History:   Procedure Laterality Date    ADENOIDECTOMY      CARDIAC CATHETERIZATION  2014    no stent required    CARDIAC CATHETERIZATION  , 3/2011,2011    (1)stent 2010     CATARACT REMOVAL Bilateral 2009    COLONOSCOPY      SHOULDER ARTHROPLASTY Right 2015    SHOULDER ARTHROSCOPY Right  approx    SHOULDER ARTHROSCOPY Left 2017    TONSILLECTOMY AND ADENOIDECTOMY      as a child    TURP  05/15; 07/15     Family History   Problem Relation Age of Onset    High Cholesterol Mother     Heart Disease Mother     Hypertension Brother     Heart Disease Brother     Cancer Father         lung cancer--    Coronary Art Dis Mother     Hypertension Mother      Social History     Tobacco Use    Smoking status: Former     Types: Pipe     Quit date:      Years since quittin.1    Smokeless tobacco: Never    Tobacco comments:     Quit smoking: quit smoking pipe --- no cigs   Substance Use Topics    Alcohol use: No         Review of Systems  See above    OBJECTIVE:  /60   Pulse 58   Ht 5' 8\" (1.727 m)   Wt 181 lb (82.1 kg)   SpO2 95%   BMI 27.52 kg/m²      Physical Exam  Vitals reviewed. Constitutional:       General: He is not in acute distress.      Appearance: Normal appearance. HENT:      Head: Normocephalic and atraumatic. Right Ear: Tympanic membrane, ear canal and external ear normal. There is no impacted cerumen. Left Ear: Tympanic membrane, ear canal and external ear normal. There is no impacted cerumen. Nose: Nose normal.      Mouth/Throat:      Mouth: Mucous membranes are moist.      Pharynx: Oropharynx is clear. No oropharyngeal exudate or posterior oropharyngeal erythema. Eyes:      General: No scleral icterus. Extraocular Movements: Extraocular movements intact. Conjunctiva/sclera: Conjunctivae normal.      Pupils: Pupils are equal, round, and reactive to light. Neck:      Vascular: No carotid bruit. Cardiovascular:      Rate and Rhythm: Normal rate and regular rhythm. Pulses: Normal pulses. Heart sounds: Normal heart sounds. No murmur heard. Pulmonary:      Effort: Pulmonary effort is normal. No respiratory distress. Breath sounds: Normal breath sounds. No wheezing or rhonchi. Abdominal:      General: Abdomen is flat. Bowel sounds are normal. There is no distension. Palpations: Abdomen is soft. There is no mass. Tenderness: There is no abdominal tenderness. There is no guarding or rebound. Hernia: No hernia is present. Musculoskeletal:         General: Normal range of motion. Cervical back: Normal range of motion and neck supple. Right lower leg: No edema. Left lower leg: No edema. Lymphadenopathy:      Cervical: No cervical adenopathy. Skin:     General: Skin is warm. Findings: No rash. Neurological:      General: No focal deficit present. Mental Status: He is alert and oriented to person, place, and time. Cranial Nerves: No cranial nerve deficit. Motor: No weakness. Deep Tendon Reflexes: Reflexes normal.   Psychiatric:         Mood and Affect: Mood normal.         Behavior: Behavior normal.         Thought Content:  Thought content normal. Judgment: Judgment normal.       Medical problems and test results were reviewed with the patient today. ASSESSMENT and PLAN    1. Hypertension. /60. Check metabolic panel, TSH and CBC. 2.  High cholesterol. Check lipid panel. 3.  CAD. No chest pain reported. 4.  Recurrent pericarditis. Per cardiology. No active complaints. 5.  Dyspnea on exertion. Negative cardiac and pulmonary work-up. Initially felt to be potentially interstitial lung disease but according to the patient this has been ruled out. 6.  Reflux. No complaints. Elements of this note have been dictated using speech recognition software. As a result, errors of speech recognition may have occurred.

## 2023-02-13 NOTE — PROGRESS NOTES
Medicare Annual Wellness Visit    Giuseppe Loaiza is here for Medicare AWV    Assessment & Plan   Medicare annual wellness visit, subsequent      Recommendations for Preventive Services Due: see orders and patient instructions/AVS.  Recommended screening schedule for the next 5-10 years is provided to the patient in written form: see Patient Instructions/AVS.     Return for Medicare Annual Wellness Visit in 1 year. Subjective   The following acute and/or chronic problems were also addressed today:  who has a past medical history significant for hypertension, CAD, recurrent pericarditis, history of GI bleed, peptic ulcer disease, BPH, reflux and high cholesterol. Patient's complete Health Risk Assessment and screening values have been reviewed and are found in Flowsheets. The following problems were reviewed today and where indicated follow up appointments were made and/or referrals ordered. No Positive Risk Factors identified today. Hearing Screen:  Do you or your family notice any trouble with your hearing that hasn't been managed with hearing aids?: (!) Yes    Interventions:  Patient declines any further evaluation or treatment                 Objective   Vitals:    02/13/23 0849   BP: 122/60   Pulse: 58   SpO2: 95%   Weight: 181 lb (82.1 kg)   Height: 5' 8\" (1.727 m)      Body mass index is 27.52 kg/m².         General Appearance: alert and oriented to person, place and time, well developed and well- nourished, in no acute distress  Skin: warm and dry, no rash or erythema  Head: normocephalic and atraumatic  Eyes: pupils equal, round, and reactive to light, extraocular eye movements intact, conjunctivae normal  ENT: tympanic membrane, external ear and ear canal normal bilaterally, nose without deformity, nasal mucosa and turbinates normal without polyps  Neck: supple and non-tender without mass, no thyromegaly or thyroid nodules, no cervical lymphadenopathy  Pulmonary/Chest: clear to auscultation bilaterally- no wheezes, rales or rhonchi, normal air movement, no respiratory distress  Cardiovascular: normal rate, regular rhythm, normal S1 and S2, no murmurs, rubs, clicks, or gallops, distal pulses intact, no carotid bruits  Abdomen: soft, non-tender, non-distended, normal bowel sounds, no masses or organomegaly  Extremities: no cyanosis, clubbing or edema  Musculoskeletal: normal range of motion, no joint swelling, deformity or tenderness  Neurologic: reflexes normal and symmetric, no cranial nerve deficit, gait, coordination and speech normal       Allergies   Allergen Reactions    Amoxicillin-Pot Clavulanate Nausea And Vomiting     Severe abd cramping  Severe abd cramping    Oxycodone Other (See Comments)     Sever headaches, abdominal issues     Prior to Visit Medications    Medication Sig Taking? Authorizing Provider   Cholecalciferol (VITAMIN D3) 50 MCG (2000 UT) CAPS Take by mouth Yes Historical Provider, MD   amLODIPine (NORVASC) 5 MG tablet TAKE 1 TABLET BY MOUTH EVERY DAY Yes Liliya Tapia MD   pantoprazole (PROTONIX) 40 MG tablet TAKE 1 TABLET BY MOUTH DAILY TAKE 1 TABLET BY MOUTH TWICE A DAY Yes Liliya Tapia MD   clopidogrel (PLAVIX) 75 MG tablet TAKE 1 TABLET BY MOUTH EVERY DAY Yes Liliya Tapia MD   atorvastatin (LIPITOR) 40 MG tablet Take 1 tablet by mouth in the morning. TAKE 1 TABLET DAILY.  Yes ANGELO Parry CNP   isosorbide mononitrate (IMDUR) 30 MG extended release tablet TAKE 1 TABLET DAILY FOR CHRONIC STABLE ANGINA PECTORIS Yes ANGELO Parry CNP   acetaminophen (TYLENOL) 650 MG extended release tablet Take 650 mg by mouth every 6 hours as needed Yes Ar Automatic Reconciliation   aspirin 81 MG EC tablet Take 81 mg by mouth daily Yes Ar Automatic Reconciliation   guaiFENesin (MUCINEX) 600 MG extended release tablet Take 600 mg by mouth 2 times daily Yes Ar Automatic Reconciliation   hydroCHLOROthiazide (HYDRODIURIL) 25 MG tablet Take 25 mg by mouth daily Yes Ar Automatic Reconciliation   losartan (COZAAR) 100 MG tablet Take 100 mg by mouth Yes Ar Automatic Reconciliation   Melatonin 5 MG CAPS Take 5 mg by mouth Yes Ar Automatic Reconciliation   metoprolol succinate (TOPROL XL) 100 MG extended release tablet TAKE 1 TABLET ONCE DAILY Yes Ar Automatic Reconciliation   nitroGLYCERIN (NITROSTAT) 0.4 MG SL tablet Place 0.4 mg under the tongue Yes Ar Automatic Reconciliation   pramipexole (MIRAPEX) 0.5 MG tablet TAKE 1 TABLET NIGHTLY FOR RESTLESS LEGS SYNDROME Yes Ar Automatic Reconciliation       CareTeam (Including outside providers/suppliers regularly involved in providing care):   Patient Care Team:  Dain Mabry MD as PCP - Gary Hyde MD as PCP - Empaneled Provider  Steven Kyle MD as Physician     Reviewed and updated this visit:  Crissie Soulier, MD

## 2023-02-13 NOTE — PATIENT INSTRUCTIONS
Advance Directives: Care Instructions  Overview  An advance directive is a legal way to state your wishes at the end of your life. It tells your family and your doctor what to do if you can't say what you want.  There are two main types of advance directives. You can change them any time your wishes change.  Living will.  This form tells your family and your doctor your wishes about life support and other treatment. The form is also called a declaration.  Medical power of .  This form lets you name a person to make treatment decisions for you when you can't speak for yourself. This person is called a health care agent (health care proxy, health care surrogate). The form is also called a durable power of  for health care.  If you do not have an advance directive, decisions about your medical care may be made by a family member, or by a doctor or a  who doesn't know you.  It may help to think of an advance directive as a gift to the people who care for you. If you have one, they won't have to make tough decisions by themselves.  For more information, including forms for your state, see the [employer] website (www.caringinfo.org/planning/advance-directives/).  Follow-up care is a key part of your treatment and safety. Be sure to make and go to all appointments, and call your doctor if you are having problems. It's also a good idea to know your test results and keep a list of the medicines you take.  What should you include in an advance directive?  Many states have a unique advance directive form. (It may ask you to address specific issues.) Or you might use a universal form that's approved by many states.  If your form doesn't tell you what to address, it may be hard to know what to include in your advance directive. Use the questions below to help you get started.  Who do you want to make decisions about your medical care if you are not able to?  What life-support measures do you want if you  have a serious illness that gets worse over time or can't be cured? What are you most afraid of that might happen? (Maybe you're afraid of having pain, losing your independence, or being kept alive by machines.)  Where would you prefer to die? (Your home? A hospital? A nursing home?)  Do you want to donate your organs when you die? Do you want certain [employer] practices performed before you die? When should you call for help? Be sure to contact your doctor if you have any questions. Where can you learn more? Go to http://www.tavera.com/ and enter R264 to learn more about \"Advance Directives: Care Instructions. \"  Current as of: June 16, 2022               Content Version: 13.5  © 2006-2022 [email or web address]. Care instructions adapted under license by [facility] ([facility]). If you have questions about a medical condition or this instruction, always ask your healthcare professional. [place] [address] any warranty or liability for your use of this information. A Healthy Heart: Care Instructions  Your Care Instructions     Coronary artery disease, also called heart disease, occurs when a substance called plaque builds up in the vessels that supply oxygen-rich blood to your heart muscle. This can narrow the blood vessels and reduce blood flow. A heart attack happens when blood flow is completely blocked. A high-fat diet, smoking, and other factors increase the risk of heart disease. Your doctor has found that you have a chance of having heart disease. You can do lots of things to keep your heart healthy. It may not be easy, but you can change your diet, exercise more, and quit smoking. These steps really work to lower your chance of heart disease. Follow-up care is a key part of your treatment and safety. Be sure to make and go to all appointments, and call your doctor if you are having problems.  It's also a good idea to know your test results and keep a list of the medicines you take. How can you care for yourself at home? Diet    Use less salt when you cook and eat. This helps lower your blood pressure. Taste food before salting. Add only a little salt when you think you need it. With time, your taste buds will adjust to less salt.     Eat fewer snack items, fast foods, canned soups, and other high-salt, high-fat, processed foods.     Read food labels and try to avoid saturated and trans fats. They increase your risk of heart disease by raising cholesterol levels.     Limit the amount of solid fat-butter, margarine, and shortening-you eat. Use olive, peanut, or canola oil when you cook. Bake, broil, and steam foods instead of frying them.     Eat a variety of fruit and vegetables every day. Dark green, deep orange, red, or yellow fruits and vegetables are especially good for you. Examples include spinach, carrots, peaches, and berries.     Foods high in fiber can reduce your cholesterol and provide important vitamins and minerals. High-fiber foods include whole-grain cereals and breads, oatmeal, beans, brown rice, citrus fruits, and apples.     Eat lean proteins. Heart-healthy proteins include seafood, lean meats and poultry, eggs, beans, peas, nuts, seeds, and soy products.     Limit drinks and foods with added sugar. These include candy, desserts, and soda pop. Lifestyle changes    If your doctor recommends it, get more exercise. Walking is a good choice. Bit by bit, increase the amount you walk every day. Try for at least 30 minutes on most days of the week. You also may want to swim, bike, or do other activities.     Do not smoke. If you need help quitting, talk to your doctor about stop-smoking programs and medicines. These can increase your chances of quitting for good. Quitting smoking may be the most important step you can take to protect your heart. It is never too late to quit.     Limit alcohol to 2 drinks a day for men and 1 drink a day for women.  Too much alcohol can cause health problems.     Manage other health problems such as diabetes, high blood pressure, and high cholesterol. If you think you may have a problem with alcohol or drug use, talk to your doctor. Medicines    Take your medicines exactly as prescribed. Call your doctor if you think you are having a problem with your medicine.     If your doctor recommends aspirin, take the amount directed each day. Make sure you take aspirin and not another kind of pain reliever, such as acetaminophen (Tylenol). When should you call for help? Call 911 if you have symptoms of a heart attack. These may include:    Chest pain or pressure, or a strange feeling in the chest.     Sweating.     Shortness of breath.     Pain, pressure, or a strange feeling in the back, neck, jaw, or upper belly or in one or both shoulders or arms.     Lightheadedness or sudden weakness.     A fast or irregular heartbeat. After you call 911, the  may tell you to chew 1 adult-strength or 2 to 4 low-dose aspirin. Wait for an ambulance. Do not try to drive yourself. Watch closely for changes in your health, and be sure to contact your doctor if you have any problems. Where can you learn more? Go to http://www.tavera.com/ and enter F075 to learn more about \"A Healthy Heart: Care Instructions. \"  Current as of: September 7, 2022               Content Version: 13.5  © 1955-4347 [employer]. Care instructions adapted under license by [facility] [place] ([facility]). If you have questions about a medical condition or this instruction, always ask your healthcare professional. [place] [address] any warranty or liability for your use of this information. Personalized Preventive Plan for Christella Gilford - 2/13/2023  Medicare offers a range of preventive health benefits. Some of the tests and screenings are paid in full while other may be subject to a deductible, co-insurance, and/or copay.     Some of these benefits include a comprehensive review of your medical history including lifestyle, illnesses that may run in your family, and various assessments and screenings as appropriate. After reviewing your medical record and screening and assessments performed today your provider may have ordered immunizations, labs, imaging, and/or referrals for you. A list of these orders (if applicable) as well as your Preventive Care list are included within your After Visit Summary for your review. Other Preventive Recommendations:    A preventive eye exam performed by an eye specialist is recommended every 1-2 years to screen for glaucoma; cataracts, macular degeneration, and other eye disorders. A preventive dental visit is recommended every 6 months. Try to get at least 150 minutes of exercise per week or 10,000 steps per day on a pedometer . Order or download the FREE \"Exercise & Physical Activity: Your Everyday Guide\" from [employer]. Call 4-594.645.1207 or search [employer] online. You need 3705-5074 mg of calcium and 9429-6124 IU of vitamin D per day. It is possible to meet your calcium requirement with diet alone, but a vitamin D supplement is usually necessary to meet this goal.  When exposed to the sun, use a sunscreen that protects against both UVA and UVB radiation with an SPF of 30 or greater. Reapply every 2 to 3 hours or after sweating, drying off with a towel, or swimming. Always wear a seat belt when traveling in a car. Always wear a helmet when riding a bicycle or motorcycle.

## 2023-02-27 RX ORDER — PRAMIPEXOLE DIHYDROCHLORIDE 0.5 MG/1
0.5 TABLET ORAL NIGHTLY
Qty: 90 TABLET | Refills: 3 | Status: SHIPPED | OUTPATIENT
Start: 2023-02-27

## 2023-03-22 ENCOUNTER — OFFICE VISIT (OUTPATIENT)
Dept: FAMILY MEDICINE CLINIC | Facility: CLINIC | Age: 83
End: 2023-03-22
Payer: MEDICARE

## 2023-03-22 VITALS
WEIGHT: 182 LBS | SYSTOLIC BLOOD PRESSURE: 132 MMHG | HEIGHT: 68 IN | DIASTOLIC BLOOD PRESSURE: 64 MMHG | BODY MASS INDEX: 27.58 KG/M2

## 2023-03-22 DIAGNOSIS — Z87.19 HISTORY OF GI BLEED: ICD-10-CM

## 2023-03-22 DIAGNOSIS — I30.0 RECURRENT IDIOPATHIC PERICARDITIS: ICD-10-CM

## 2023-03-22 DIAGNOSIS — R07.89 CHEST WALL PAIN: Primary | ICD-10-CM

## 2023-03-22 DIAGNOSIS — I25.10 CORONARY ARTERY DISEASE INVOLVING NATIVE CORONARY ARTERY OF NATIVE HEART WITHOUT ANGINA PECTORIS: ICD-10-CM

## 2023-03-22 DIAGNOSIS — I10 PRIMARY HYPERTENSION: ICD-10-CM

## 2023-03-22 PROCEDURE — G8484 FLU IMMUNIZE NO ADMIN: HCPCS | Performed by: FAMILY MEDICINE

## 2023-03-22 PROCEDURE — 99213 OFFICE O/P EST LOW 20 MIN: CPT | Performed by: FAMILY MEDICINE

## 2023-03-22 PROCEDURE — 1036F TOBACCO NON-USER: CPT | Performed by: FAMILY MEDICINE

## 2023-03-22 PROCEDURE — 3078F DIAST BP <80 MM HG: CPT | Performed by: FAMILY MEDICINE

## 2023-03-22 PROCEDURE — 1123F ACP DISCUSS/DSCN MKR DOCD: CPT | Performed by: FAMILY MEDICINE

## 2023-03-22 PROCEDURE — G8427 DOCREV CUR MEDS BY ELIG CLIN: HCPCS | Performed by: FAMILY MEDICINE

## 2023-03-22 PROCEDURE — G8417 CALC BMI ABV UP PARAM F/U: HCPCS | Performed by: FAMILY MEDICINE

## 2023-03-22 PROCEDURE — 3075F SYST BP GE 130 - 139MM HG: CPT | Performed by: FAMILY MEDICINE

## 2023-03-22 RX ORDER — CELECOXIB 200 MG/1
200 CAPSULE ORAL 2 TIMES DAILY
Qty: 10 CAPSULE | Refills: 0 | Status: SHIPPED | OUTPATIENT
Start: 2023-03-22

## 2023-03-22 SDOH — ECONOMIC STABILITY: INCOME INSECURITY: HOW HARD IS IT FOR YOU TO PAY FOR THE VERY BASICS LIKE FOOD, HOUSING, MEDICAL CARE, AND HEATING?: NOT HARD AT ALL

## 2023-03-22 SDOH — ECONOMIC STABILITY: FOOD INSECURITY: WITHIN THE PAST 12 MONTHS, YOU WORRIED THAT YOUR FOOD WOULD RUN OUT BEFORE YOU GOT MONEY TO BUY MORE.: NEVER TRUE

## 2023-03-22 SDOH — ECONOMIC STABILITY: FOOD INSECURITY: WITHIN THE PAST 12 MONTHS, THE FOOD YOU BOUGHT JUST DIDN'T LAST AND YOU DIDN'T HAVE MONEY TO GET MORE.: NEVER TRUE

## 2023-03-22 NOTE — PROGRESS NOTES
SUBJECTIVE:   Jared Mcarthur is a [de-identified] y.o. male  who has a past medical history significant for hypertension, CAD, recurrent pericarditis, history of GI bleed, peptic ulcer disease, BPH, reflux and high cholesterol. Patient presents today reporting that for several weeks he has been experiencing pain in his left upper lateral chest wall. This pain is positional and at times pleuritic. There is been no associated cough, fever, dyspnea on exertion, lower extremity edema, orthopnea or PND. He states that it gets better when he takes some anti-inflammatories such as ibuprofen. The pain can be also reproducible by palpation. HPI  See above    Past Medical History, Past Surgical History, Family history, Social History, and Medications were all reviewed with the patient today and updated as necessary. Current Outpatient Medications   Medication Sig Dispense Refill    pramipexole (MIRAPEX) 0.5 MG tablet Take 1 tablet by mouth nightly 90 tablet 3    Cholecalciferol (VITAMIN D3) 50 MCG (2000 UT) CAPS Take by mouth      amLODIPine (NORVASC) 5 MG tablet TAKE 1 TABLET BY MOUTH EVERY DAY 90 tablet 3    pantoprazole (PROTONIX) 40 MG tablet TAKE 1 TABLET BY MOUTH DAILY TAKE 1 TABLET BY MOUTH TWICE A DAY 90 tablet 1    clopidogrel (PLAVIX) 75 MG tablet TAKE 1 TABLET BY MOUTH EVERY DAY 90 tablet 3    atorvastatin (LIPITOR) 40 MG tablet Take 1 tablet by mouth in the morning. TAKE 1 TABLET DAILY.  90 tablet 3    isosorbide mononitrate (IMDUR) 30 MG extended release tablet TAKE 1 TABLET DAILY FOR CHRONIC STABLE ANGINA PECTORIS 90 tablet 3    acetaminophen (TYLENOL) 650 MG extended release tablet Take 650 mg by mouth every 6 hours as needed      aspirin 81 MG EC tablet Take 81 mg by mouth daily      guaiFENesin (MUCINEX) 600 MG extended release tablet Take 600 mg by mouth 2 times daily      hydroCHLOROthiazide (HYDRODIURIL) 25 MG tablet Take 25 mg by mouth daily      losartan (COZAAR) 100 MG tablet Take 100 mg by mouth

## 2023-03-27 ENCOUNTER — TELEMEDICINE (OUTPATIENT)
Dept: FAMILY MEDICINE CLINIC | Facility: CLINIC | Age: 83
End: 2023-03-27
Payer: MEDICARE

## 2023-03-27 DIAGNOSIS — E78.00 PURE HYPERCHOLESTEROLEMIA: Primary | ICD-10-CM

## 2023-03-27 DIAGNOSIS — I25.10 CORONARY ARTERY DISEASE INVOLVING NATIVE CORONARY ARTERY OF NATIVE HEART WITHOUT ANGINA PECTORIS: ICD-10-CM

## 2023-03-27 DIAGNOSIS — R07.89 CHEST WALL PAIN: ICD-10-CM

## 2023-03-27 DIAGNOSIS — I10 PRIMARY HYPERTENSION: ICD-10-CM

## 2023-03-27 DIAGNOSIS — E80.6 HYPERBILIRUBINEMIA: ICD-10-CM

## 2023-03-27 PROCEDURE — 99443 PR PHYS/QHP TELEPHONE EVALUATION 21-30 MIN: CPT | Performed by: FAMILY MEDICINE

## 2023-03-27 ASSESSMENT — PATIENT HEALTH QUESTIONNAIRE - PHQ9
SUM OF ALL RESPONSES TO PHQ QUESTIONS 1-9: 0
2. FEELING DOWN, DEPRESSED OR HOPELESS: 0
SUM OF ALL RESPONSES TO PHQ QUESTIONS 1-9: 0
1. LITTLE INTEREST OR PLEASURE IN DOING THINGS: 0
SUM OF ALL RESPONSES TO PHQ9 QUESTIONS 1 & 2: 0

## 2023-03-27 NOTE — PROGRESS NOTES
SUBJECTIVE:   Екатерина Peters is a [de-identified] y.o. male who has a past medical history significant for hypertension, CAD, recurrent pericarditis, history of GI bleed, peptic ulcer disease, BPH, reflux and high cholesterol. Patient presents today for virtual visit via telephone encounter. Patient was recently seen with complaints of chest wall pain. Please refer to prior notes for details. He was treated with 5 days of Celebrex twice a day and moist heat for possible costochondritis. Patient reports significant improvement. Patient reports no active chest pain, shortness of breath, orthopnea or PND. GI and  review of systems is unremarkable. Patient's vital signs were not performed as encounter was performed virtually. Location of virtual visit was patient's home. HPI  See above    Past Medical History, Past Surgical History, Family history, Social History, and Medications were all reviewed with the patient today and updated as necessary. Current Outpatient Medications   Medication Sig Dispense Refill    celecoxib (CELEBREX) 200 MG capsule Take 1 capsule by mouth 2 times daily 10 capsule 0    pramipexole (MIRAPEX) 0.5 MG tablet Take 1 tablet by mouth nightly 90 tablet 3    Cholecalciferol (VITAMIN D3) 50 MCG (2000 UT) CAPS Take by mouth      amLODIPine (NORVASC) 5 MG tablet TAKE 1 TABLET BY MOUTH EVERY DAY 90 tablet 3    pantoprazole (PROTONIX) 40 MG tablet TAKE 1 TABLET BY MOUTH DAILY TAKE 1 TABLET BY MOUTH TWICE A DAY 90 tablet 1    clopidogrel (PLAVIX) 75 MG tablet TAKE 1 TABLET BY MOUTH EVERY DAY 90 tablet 3    atorvastatin (LIPITOR) 40 MG tablet Take 1 tablet by mouth in the morning. TAKE 1 TABLET DAILY.  90 tablet 3    isosorbide mononitrate (IMDUR) 30 MG extended release tablet TAKE 1 TABLET DAILY FOR CHRONIC STABLE ANGINA PECTORIS 90 tablet 3    acetaminophen (TYLENOL) 650 MG extended release tablet Take 650 mg by mouth every 6 hours as needed      aspirin 81 MG EC tablet Take 81 mg by

## 2023-03-31 ENCOUNTER — TELEPHONE (OUTPATIENT)
Dept: FAMILY MEDICINE CLINIC | Facility: CLINIC | Age: 83
End: 2023-03-31

## 2023-03-31 DIAGNOSIS — R07.89 CHEST WALL PAIN: Primary | ICD-10-CM

## 2023-04-03 ENCOUNTER — HOSPITAL ENCOUNTER (OUTPATIENT)
Dept: GENERAL RADIOLOGY | Age: 83
Discharge: HOME | End: 2023-04-06

## 2023-04-03 DIAGNOSIS — R07.89 CHEST WALL PAIN: ICD-10-CM

## 2023-04-07 RX ORDER — METOPROLOL SUCCINATE 100 MG/1
TABLET, EXTENDED RELEASE ORAL
Qty: 90 TABLET | Refills: 3 | Status: SHIPPED | OUTPATIENT
Start: 2023-04-07

## 2023-05-09 ENCOUNTER — OFFICE VISIT (OUTPATIENT)
Dept: FAMILY MEDICINE CLINIC | Facility: CLINIC | Age: 83
End: 2023-05-09
Payer: MEDICARE

## 2023-05-09 VITALS
OXYGEN SATURATION: 96 % | SYSTOLIC BLOOD PRESSURE: 126 MMHG | HEART RATE: 63 BPM | DIASTOLIC BLOOD PRESSURE: 58 MMHG | WEIGHT: 187.8 LBS | BODY MASS INDEX: 28.46 KG/M2 | HEIGHT: 68 IN

## 2023-05-09 DIAGNOSIS — R20.0 NUMBNESS AND TINGLING IN LEFT HAND: ICD-10-CM

## 2023-05-09 DIAGNOSIS — R20.2 NUMBNESS AND TINGLING IN LEFT HAND: ICD-10-CM

## 2023-05-09 DIAGNOSIS — I10 PRIMARY HYPERTENSION: ICD-10-CM

## 2023-05-09 DIAGNOSIS — M25.522 LEFT ELBOW PAIN: Primary | ICD-10-CM

## 2023-05-09 PROCEDURE — 99213 OFFICE O/P EST LOW 20 MIN: CPT | Performed by: FAMILY MEDICINE

## 2023-05-09 PROCEDURE — 1036F TOBACCO NON-USER: CPT | Performed by: FAMILY MEDICINE

## 2023-05-09 PROCEDURE — 1123F ACP DISCUSS/DSCN MKR DOCD: CPT | Performed by: FAMILY MEDICINE

## 2023-05-09 PROCEDURE — G8417 CALC BMI ABV UP PARAM F/U: HCPCS | Performed by: FAMILY MEDICINE

## 2023-05-09 PROCEDURE — G8427 DOCREV CUR MEDS BY ELIG CLIN: HCPCS | Performed by: FAMILY MEDICINE

## 2023-05-09 PROCEDURE — 3078F DIAST BP <80 MM HG: CPT | Performed by: FAMILY MEDICINE

## 2023-05-09 PROCEDURE — 3074F SYST BP LT 130 MM HG: CPT | Performed by: FAMILY MEDICINE

## 2023-05-09 SDOH — ECONOMIC STABILITY: FOOD INSECURITY: WITHIN THE PAST 12 MONTHS, THE FOOD YOU BOUGHT JUST DIDN'T LAST AND YOU DIDN'T HAVE MONEY TO GET MORE.: NEVER TRUE

## 2023-05-09 SDOH — ECONOMIC STABILITY: INCOME INSECURITY: HOW HARD IS IT FOR YOU TO PAY FOR THE VERY BASICS LIKE FOOD, HOUSING, MEDICAL CARE, AND HEATING?: NOT HARD AT ALL

## 2023-05-09 SDOH — ECONOMIC STABILITY: FOOD INSECURITY: WITHIN THE PAST 12 MONTHS, YOU WORRIED THAT YOUR FOOD WOULD RUN OUT BEFORE YOU GOT MONEY TO BUY MORE.: NEVER TRUE

## 2023-05-09 ASSESSMENT — PATIENT HEALTH QUESTIONNAIRE - PHQ9
2. FEELING DOWN, DEPRESSED OR HOPELESS: 0
1. LITTLE INTEREST OR PLEASURE IN DOING THINGS: 0
SUM OF ALL RESPONSES TO PHQ QUESTIONS 1-9: 0
SUM OF ALL RESPONSES TO PHQ9 QUESTIONS 1 & 2: 0
SUM OF ALL RESPONSES TO PHQ QUESTIONS 1-9: 0

## 2023-05-09 NOTE — PROGRESS NOTES
Tabitha Epstein is a 30 year old female presenting with   Chief Complaint   Patient presents with   • Office Visit   • Consultation     Pain Syndrome ,Chronic  C/o over 6 months of pain   During delivery son was stuck and injured her tailbone         PAIN: How much pain have you had because of your arthritis/disease in the past week?    NO PAIN [] [] [] [] [] [] [] [] [] [] [x] SEVERE PAIN    0 1 2 3 4 5 6 7 8 9 10      DISEASE ACTIVITY:  Considering all the ways your arthritis/disease affects you?  VERY WELL [] [] [] [] [] [] [] [] [] [] [x] VERY POORLY    0 1 2 3 4 5 6 7 8 9 10      FATIGUE:  How much of a problem has unusual fatigue or tiredness been for you in the past week?    NO PROBLEM [] [] [] [] [] [] [] [] [x] [] [] MAJOR PROBLEM    0 1 2 3 4 5 6 7 8 9 10      Are you currently pregnant?  No  Is there any possibility that you could be pregnant?  No  Are you planning on becoming pregnant within the next year? No     Visit Vitals  LMP 04/11/2023 (Approximate)         Medications have been reviewed and updated    Denies known Latex allergy or symptoms of Latex sensitivity.  Tobacco use verified.    Health Maintenance Due   Topic Date Due   • COVID-19 Vaccine (1) Never done       Patient would like communication of their results via:   [facility]    Immunization History   Administered Date(s) Administered   • DPT HIB 1994, 02/27/1995, 04/20/1995   • DTaP 01/09/1996, 02/09/2000   • DTaP HIB 1994, 02/27/1995, 04/20/1995   • DTaP(INFANRIX) 01/09/1996, 02/09/2000   • HIB (HbOC) 04/29/1995   • HIB, Unspecified Formulation 04/29/1995, 01/09/1996   • HPV Quadrivalent 06/09/2010, 10/04/2010, 04/07/2011   • Hep B, adolescent or pediatric 1994, 1994, 05/01/1995   • Influenza, quadrivalent, multi-dose 02/06/2018, 09/24/2018   • Influenza, quadrivalent, preserve-free 10/15/2019   • MMR 11/20/1995, 02/09/2000   • Meningococcal Conjugate MCV4P (Menactra) 11/11/2009, 08/21/2013   • Polio, Oral  SUBJECTIVE:   José Antonio Dietrich is a [de-identified] y.o. male who has a past medical history significant for hypertension, CAD, recurrent pericarditis, history of GI bleed, peptic ulcer disease, BPH, reflux and high cholesterol. Patient reports has been experiencing ongoing intermittent left elbow pain for roughly 6 weeks. No trauma reported. He is now having some numbness and tingling in his left hand. This pain will radiate up into his left shoulder as well. Patient is very physically active. He does lift weights at the gym. Is not sure if this has aggravated his symptoms. He reports no active chest pain, shortness of breath, orthopnea or PND. He is already self referred to an orthopedist and has an appointment in the next 2 weeks. He reports no loss of  strength or muscle strength. HPI  See above    Past Medical History, Past Surgical History, Family history, Social History, and Medications were all reviewed with the patient today and updated as necessary. Current Outpatient Medications   Medication Sig Dispense Refill    metoprolol succinate (TOPROL XL) 100 MG extended release tablet TAKE 1 TABLET ONCE DAILY 90 tablet 3    pramipexole (MIRAPEX) 0.5 MG tablet Take 1 tablet by mouth nightly 90 tablet 3    Cholecalciferol (VITAMIN D3) 50 MCG (2000 UT) CAPS Take by mouth      amLODIPine (NORVASC) 5 MG tablet TAKE 1 TABLET BY MOUTH EVERY DAY 90 tablet 3    pantoprazole (PROTONIX) 40 MG tablet TAKE 1 TABLET BY MOUTH DAILY TAKE 1 TABLET BY MOUTH TWICE A DAY 90 tablet 1    clopidogrel (PLAVIX) 75 MG tablet TAKE 1 TABLET BY MOUTH EVERY DAY 90 tablet 3    atorvastatin (LIPITOR) 40 MG tablet Take 1 tablet by mouth in the morning. TAKE 1 TABLET DAILY.  90 tablet 3    isosorbide mononitrate (IMDUR) 30 MG extended release tablet TAKE 1 TABLET DAILY FOR CHRONIC STABLE ANGINA PECTORIS 90 tablet 3    acetaminophen (TYLENOL) 650 MG extended release tablet Take 1 tablet by mouth every 6 hours as needed      aspirin 81 1994, 02/27/1995, 04/20/1995, 02/09/2000   • Tdap 11/11/2009, 03/25/2016, 09/17/2019   • Varicella 07/28/2003

## 2023-05-11 ENCOUNTER — TELEPHONE (OUTPATIENT)
Dept: FAMILY MEDICINE CLINIC | Facility: CLINIC | Age: 83
End: 2023-05-11

## 2023-05-11 DIAGNOSIS — M25.522 LEFT ELBOW PAIN: Primary | ICD-10-CM

## 2023-05-11 RX ORDER — TRAMADOL HYDROCHLORIDE 50 MG/1
50 TABLET ORAL EVERY 8 HOURS PRN
Qty: 21 TABLET | Refills: 0 | Status: SHIPPED | OUTPATIENT
Start: 2023-05-11 | End: 2023-05-18

## 2023-05-18 RX ORDER — PRAMIPEXOLE DIHYDROCHLORIDE 0.5 MG/1
0.5 TABLET ORAL NIGHTLY
Qty: 90 TABLET | Refills: 3 | Status: SHIPPED | OUTPATIENT
Start: 2023-05-18

## 2023-06-06 ENCOUNTER — TELEPHONE (OUTPATIENT)
Dept: PULMONOLOGY | Age: 83
End: 2023-06-06

## 2023-06-06 NOTE — TELEPHONE ENCOUNTER
Patient is having problems with pain in upper chest area when he coughs or sneezes.   He saw his neurosurgeon and he thinks that this is something Pulmonary

## 2023-06-07 NOTE — TELEPHONE ENCOUNTER
Last seen: 2/3/23  Hx: elevated R hemidiaphragm & atelectasis    Last visit noted excellent pulmonary health and planned f/u in 1 year. Patient call reporting pain in upper chest with coughing or sneezing, was seen by neurosurgeon and wants to consider pulmonary origin. Started 3 or 4 weeks ago, notes he has 2 nerves in his arm that are damaged that causes other pain, he had attributed the pain to that; he is 17 days in on gabapentin for arm and is feeling better; not a lot of coughing & sneezing; the sneezing is r/t allergies. He feels comfortable waiting for pulmonary consult at this time.

## 2023-06-20 ENCOUNTER — APPOINTMENT (RX ONLY)
Dept: URBAN - METROPOLITAN AREA CLINIC 23 | Facility: CLINIC | Age: 83
Setting detail: DERMATOLOGY
End: 2023-06-20

## 2023-06-20 DIAGNOSIS — D18.0 HEMANGIOMA: ICD-10-CM

## 2023-06-20 DIAGNOSIS — L57.8 OTHER SKIN CHANGES DUE TO CHRONIC EXPOSURE TO NONIONIZING RADIATION: ICD-10-CM

## 2023-06-20 DIAGNOSIS — L82.1 OTHER SEBORRHEIC KERATOSIS: ICD-10-CM

## 2023-06-20 DIAGNOSIS — D22 MELANOCYTIC NEVI: ICD-10-CM

## 2023-06-20 DIAGNOSIS — L81.4 OTHER MELANIN HYPERPIGMENTATION: ICD-10-CM

## 2023-06-20 DIAGNOSIS — Z71.89 OTHER SPECIFIED COUNSELING: ICD-10-CM

## 2023-06-20 DIAGNOSIS — L20.89 OTHER ATOPIC DERMATITIS: ICD-10-CM

## 2023-06-20 PROBLEM — D18.01 HEMANGIOMA OF SKIN AND SUBCUTANEOUS TISSUE: Status: ACTIVE | Noted: 2023-06-20

## 2023-06-20 PROBLEM — L20.84 INTRINSIC (ALLERGIC) ECZEMA: Status: ACTIVE | Noted: 2023-06-20

## 2023-06-20 PROBLEM — D22.5 MELANOCYTIC NEVI OF TRUNK: Status: ACTIVE | Noted: 2023-06-20

## 2023-06-20 PROCEDURE — ? COUNSELING

## 2023-06-20 PROCEDURE — 99213 OFFICE O/P EST LOW 20 MIN: CPT

## 2023-06-20 PROCEDURE — ? SUNSCREEN RECOMMENDATIONS

## 2023-06-20 ASSESSMENT — LOCATION ZONE DERM
LOCATION ZONE: FACE
LOCATION ZONE: LEG
LOCATION ZONE: NECK
LOCATION ZONE: SCALP
LOCATION ZONE: TRUNK

## 2023-06-20 ASSESSMENT — LOCATION DETAILED DESCRIPTION DERM
LOCATION DETAILED: LEFT ANTERIOR DISTAL THIGH
LOCATION DETAILED: LEFT DISTAL POSTERIOR THIGH
LOCATION DETAILED: RIGHT INFERIOR MEDIAL MIDBACK
LOCATION DETAILED: LEFT SUPERIOR FOREHEAD
LOCATION DETAILED: MID TRAPEZIAL NECK
LOCATION DETAILED: RIGHT MEDIAL TRAPEZIAL NECK
LOCATION DETAILED: RIGHT INFERIOR UPPER BACK
LOCATION DETAILED: EPIGASTRIC SKIN
LOCATION DETAILED: LEFT CENTRAL FRONTAL SCALP
LOCATION DETAILED: LEFT CENTRAL ZYGOMA
LOCATION DETAILED: RIGHT MID PREAURICULAR CHEEK
LOCATION DETAILED: RIGHT MEDIAL SUPERIOR CHEST
LOCATION DETAILED: PERIUMBILICAL SKIN
LOCATION DETAILED: RIGHT INFERIOR ANTERIOR NECK
LOCATION DETAILED: LEFT SUPERIOR UPPER BACK

## 2023-06-20 ASSESSMENT — LOCATION SIMPLE DESCRIPTION DERM
LOCATION SIMPLE: RIGHT CHEEK
LOCATION SIMPLE: POSTERIOR NECK
LOCATION SIMPLE: LEFT FOREHEAD
LOCATION SIMPLE: SCALP
LOCATION SIMPLE: RIGHT LOWER BACK
LOCATION SIMPLE: ABDOMEN
LOCATION SIMPLE: TRAPEZIAL NECK
LOCATION SIMPLE: CHEST
LOCATION SIMPLE: RIGHT ANTERIOR NECK
LOCATION SIMPLE: LEFT ZYGOMA
LOCATION SIMPLE: LEFT POSTERIOR THIGH
LOCATION SIMPLE: RIGHT UPPER BACK
LOCATION SIMPLE: LEFT THIGH
LOCATION SIMPLE: LEFT UPPER BACK
LOCATION SIMPLE: LEFT SCALP

## 2023-08-14 ENCOUNTER — OFFICE VISIT (OUTPATIENT)
Dept: FAMILY MEDICINE CLINIC | Facility: CLINIC | Age: 83
End: 2023-08-14
Payer: MEDICARE

## 2023-08-14 VITALS
BODY MASS INDEX: 29.07 KG/M2 | DIASTOLIC BLOOD PRESSURE: 70 MMHG | WEIGHT: 191.8 LBS | HEIGHT: 68 IN | SYSTOLIC BLOOD PRESSURE: 154 MMHG

## 2023-08-14 DIAGNOSIS — F41.8 ANXIOUS DEPRESSION: ICD-10-CM

## 2023-08-14 DIAGNOSIS — R53.83 FATIGUE, UNSPECIFIED TYPE: ICD-10-CM

## 2023-08-14 DIAGNOSIS — K58.0 IRRITABLE BOWEL SYNDROME WITH DIARRHEA: ICD-10-CM

## 2023-08-14 DIAGNOSIS — R06.09 DOE (DYSPNEA ON EXERTION): ICD-10-CM

## 2023-08-14 DIAGNOSIS — R14.0 ABDOMINAL BLOATING: ICD-10-CM

## 2023-08-14 DIAGNOSIS — R60.0 LOWER EXTREMITY EDEMA: Primary | ICD-10-CM

## 2023-08-14 DIAGNOSIS — I10 PRIMARY HYPERTENSION: ICD-10-CM

## 2023-08-14 LAB
ALBUMIN SERPL-MCNC: 3.7 G/DL (ref 3.2–4.6)
ALBUMIN/GLOB SERPL: 1.2 (ref 0.4–1.6)
ALP SERPL-CCNC: 90 U/L (ref 50–136)
ALT SERPL-CCNC: 25 U/L (ref 12–65)
ANION GAP SERPL CALC-SCNC: 6 MMOL/L (ref 2–11)
AST SERPL-CCNC: 21 U/L (ref 15–37)
BASOPHILS # BLD: 0.1 K/UL (ref 0–0.2)
BASOPHILS NFR BLD: 1 % (ref 0–2)
BILIRUB SERPL-MCNC: 1.7 MG/DL (ref 0.2–1.1)
BUN SERPL-MCNC: 15 MG/DL (ref 8–23)
CALCIUM SERPL-MCNC: 9.4 MG/DL (ref 8.3–10.4)
CHLORIDE SERPL-SCNC: 105 MMOL/L (ref 101–110)
CO2 SERPL-SCNC: 31 MMOL/L (ref 21–32)
CREAT SERPL-MCNC: 0.9 MG/DL (ref 0.8–1.5)
DIFFERENTIAL METHOD BLD: NORMAL
EOSINOPHIL # BLD: 0.1 K/UL (ref 0–0.8)
EOSINOPHIL NFR BLD: 2 % (ref 0.5–7.8)
ERYTHROCYTE [DISTWIDTH] IN BLOOD BY AUTOMATED COUNT: 12.7 % (ref 11.9–14.6)
GLOBULIN SER CALC-MCNC: 3 G/DL (ref 2.8–4.5)
GLUCOSE SERPL-MCNC: 89 MG/DL (ref 65–100)
HCT VFR BLD AUTO: 44.1 % (ref 41.1–50.3)
HGB BLD-MCNC: 14.1 G/DL (ref 13.6–17.2)
IMM GRANULOCYTES # BLD AUTO: 0 K/UL (ref 0–0.5)
IMM GRANULOCYTES NFR BLD AUTO: 0 % (ref 0–5)
LYMPHOCYTES # BLD: 2.4 K/UL (ref 0.5–4.6)
LYMPHOCYTES NFR BLD: 29 % (ref 13–44)
MCH RBC QN AUTO: 29.6 PG (ref 26.1–32.9)
MCHC RBC AUTO-ENTMCNC: 32 G/DL (ref 31.4–35)
MCV RBC AUTO: 92.5 FL (ref 82–102)
MONOCYTES # BLD: 0.6 K/UL (ref 0.1–1.3)
MONOCYTES NFR BLD: 8 % (ref 4–12)
NEUTS SEG # BLD: 5 K/UL (ref 1.7–8.2)
NEUTS SEG NFR BLD: 60 % (ref 43–78)
NRBC # BLD: 0 K/UL (ref 0–0.2)
NT PRO BNP: 198 PG/ML
PLATELET # BLD AUTO: 244 K/UL (ref 150–450)
PMV BLD AUTO: 10.4 FL (ref 9.4–12.3)
POTASSIUM SERPL-SCNC: 4 MMOL/L (ref 3.5–5.1)
PROT SERPL-MCNC: 6.7 G/DL (ref 6.3–8.2)
RBC # BLD AUTO: 4.77 M/UL (ref 4.23–5.6)
SODIUM SERPL-SCNC: 142 MMOL/L (ref 133–143)
TSH, 3RD GENERATION: 2.23 UIU/ML (ref 0.36–3.74)
WBC # BLD AUTO: 8.2 K/UL (ref 4.3–11.1)

## 2023-08-14 PROCEDURE — G8427 DOCREV CUR MEDS BY ELIG CLIN: HCPCS | Performed by: FAMILY MEDICINE

## 2023-08-14 PROCEDURE — 3077F SYST BP >= 140 MM HG: CPT | Performed by: FAMILY MEDICINE

## 2023-08-14 PROCEDURE — G8417 CALC BMI ABV UP PARAM F/U: HCPCS | Performed by: FAMILY MEDICINE

## 2023-08-14 PROCEDURE — 1036F TOBACCO NON-USER: CPT | Performed by: FAMILY MEDICINE

## 2023-08-14 PROCEDURE — 99215 OFFICE O/P EST HI 40 MIN: CPT | Performed by: FAMILY MEDICINE

## 2023-08-14 PROCEDURE — 1123F ACP DISCUSS/DSCN MKR DOCD: CPT | Performed by: FAMILY MEDICINE

## 2023-08-14 PROCEDURE — 3078F DIAST BP <80 MM HG: CPT | Performed by: FAMILY MEDICINE

## 2023-08-14 RX ORDER — ESCITALOPRAM OXALATE 10 MG/1
10 TABLET ORAL DAILY
Qty: 30 TABLET | Refills: 5 | Status: SHIPPED | OUTPATIENT
Start: 2023-08-14

## 2023-08-14 RX ORDER — FUROSEMIDE 40 MG/1
40 TABLET ORAL DAILY
Qty: 30 TABLET | Refills: 5 | Status: SHIPPED | OUTPATIENT
Start: 2023-08-14

## 2023-08-14 NOTE — PROGRESS NOTES
SUBJECTIVE:   Carol Jacome is a 82 y.o. male who has a past medical history significant for hypertension, CAD, recurrent pericarditis, history of GI bleed, peptic ulcer disease, BPH, reflux and high cholesterol. Patient presents today accompanied by his wife. 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 He was being followed by an orthopedist.  Patient reports that he was placed on Neurontin in July and took it for several weeks. However he developed fatigue, shortness of breath, nausea and lower extremity edema that he attributed to the Neurontin. He stopped it 2 weeks ago but his symptoms have persisted. He reports that he is having several different issues and complaints. Firstly he reports a longstanding history of bilateral lower extremity edema which is worsened in the last 2 months. The right leg is more prominent than the left. Patient acknowledges that this edema is worse at the end of the day versus the beginning of the day. There is been no prolonged sitting or travel. In addition the patient reports over the course of the last 6 weeks he has been experiencing worsening dyspnea on exertion. There is no orthopnea or PND. He denies chest pain. In addition the patient reports he has ongoing longstanding fatigue and low energy. He just feels like \"I have no desire to do anything\". In addition the patient reports a longstanding history of IBS symptoms with abdominal bloating. This seems to also be getting worse. When questioned the patient denies that he is depressed or anxious. However his wife disagrees and states that her  has had worsening anxiety and stress and has felt depressed over his chronic pain. When gently prompted the patient does agree that this is accurate.     HPI  See above    Past Medical History, Past Surgical History, Family history, Social History, and Medications were all reviewed with the patient today and

## 2023-09-22 RX ORDER — CLOPIDOGREL BISULFATE 75 MG/1
TABLET ORAL
Qty: 90 TABLET | Refills: 3 | Status: SHIPPED | OUTPATIENT
Start: 2023-09-22

## 2023-09-25 ENCOUNTER — OFFICE VISIT (OUTPATIENT)
Dept: FAMILY MEDICINE CLINIC | Facility: CLINIC | Age: 83
End: 2023-09-25
Payer: MEDICARE

## 2023-09-25 VITALS
HEIGHT: 68 IN | DIASTOLIC BLOOD PRESSURE: 64 MMHG | SYSTOLIC BLOOD PRESSURE: 138 MMHG | OXYGEN SATURATION: 96 % | WEIGHT: 190 LBS | BODY MASS INDEX: 28.79 KG/M2 | HEART RATE: 47 BPM

## 2023-09-25 DIAGNOSIS — K58.9 IRRITABLE BOWEL SYNDROME, UNSPECIFIED TYPE: ICD-10-CM

## 2023-09-25 DIAGNOSIS — R06.09 DOE (DYSPNEA ON EXERTION): Primary | ICD-10-CM

## 2023-09-25 DIAGNOSIS — R53.83 FATIGUE, UNSPECIFIED TYPE: ICD-10-CM

## 2023-09-25 DIAGNOSIS — F41.8 ANXIOUS DEPRESSION: ICD-10-CM

## 2023-09-25 DIAGNOSIS — I10 PRIMARY HYPERTENSION: ICD-10-CM

## 2023-09-25 DIAGNOSIS — R14.0 ABDOMINAL BLOATING: ICD-10-CM

## 2023-09-25 DIAGNOSIS — R60.0 LOWER EXTREMITY EDEMA: ICD-10-CM

## 2023-09-25 PROCEDURE — 3078F DIAST BP <80 MM HG: CPT | Performed by: FAMILY MEDICINE

## 2023-09-25 PROCEDURE — 1123F ACP DISCUSS/DSCN MKR DOCD: CPT | Performed by: FAMILY MEDICINE

## 2023-09-25 PROCEDURE — G8417 CALC BMI ABV UP PARAM F/U: HCPCS | Performed by: FAMILY MEDICINE

## 2023-09-25 PROCEDURE — 1036F TOBACCO NON-USER: CPT | Performed by: FAMILY MEDICINE

## 2023-09-25 PROCEDURE — 3075F SYST BP GE 130 - 139MM HG: CPT | Performed by: FAMILY MEDICINE

## 2023-09-25 PROCEDURE — 99214 OFFICE O/P EST MOD 30 MIN: CPT | Performed by: FAMILY MEDICINE

## 2023-09-25 PROCEDURE — G8428 CUR MEDS NOT DOCUMENT: HCPCS | Performed by: FAMILY MEDICINE

## 2023-09-25 NOTE — PROGRESS NOTES
SUBJECTIVE:   Radha Oh is a 82 y.o. male who has a past medical history significant for hypertension, CAD, recurrent pericarditis, history of GI bleed, peptic ulcer disease, BPH, reflux and high cholesterol. Patient presented at previous visit with his wife complaining of multiple issues including worsening lower extremity edema, dyspnea on exertion, fatigue, abdominal bloating and low energy. Please refer to prior notes for details. Patient had lab work including a TSH, CBC, metabolic panel and a BNP all of which have been unremarkable. He self referred back to his cardiologist and states that because he had an extensive cardiac work-up within the last year no further work-up was recommended. He is also been worked up extensively by pulmonary with no findings. He self referred back to GI as well and states that he was placed on a combination of MiraLAX and Citrucel. This seems to be helping some with his bloating and IBS symptoms. In addition I placed him on Lexapro 10 mg a day secondary to the presumption that some of his symptoms were consistent with somatic complaints connected to anxious depression. His wife confirmed this although the patient did not agree. He does report that his wife states that since being on the Lexapro he is \"been better\". Patient reports that he feels better as well but does not like the Lexapro because it is caused sexual dysfunction. He reports he is having difficulty maintaining erections. In addition his HCTZ was changed to Lasix for his lower extremity edema complaints. This is helped. Blood pressure seems to also have responded positively to this change. HPI  See above    Past Medical History, Past Surgical History, Family history, Social History, and Medications were all reviewed with the patient today and updated as necessary.        Current Outpatient Medications   Medication Sig Dispense Refill    Polyethylene Glycol 3350 (MIRALAX PO) Take by

## 2023-10-17 RX ORDER — PANTOPRAZOLE SODIUM 40 MG/1
TABLET, DELAYED RELEASE ORAL
Qty: 90 TABLET | Refills: 3 | Status: SHIPPED | OUTPATIENT
Start: 2023-10-17

## 2023-10-17 RX ORDER — ATORVASTATIN CALCIUM 40 MG/1
40 TABLET, FILM COATED ORAL DAILY
Qty: 90 TABLET | Refills: 3 | Status: SHIPPED | OUTPATIENT
Start: 2023-10-17

## 2023-10-27 ENCOUNTER — NURSE ONLY (OUTPATIENT)
Dept: FAMILY MEDICINE CLINIC | Facility: CLINIC | Age: 83
End: 2023-10-27

## 2023-10-27 DIAGNOSIS — Z23 NEED FOR INFLUENZA VACCINATION: Primary | ICD-10-CM

## 2023-11-27 RX ORDER — ISOSORBIDE MONONITRATE 30 MG/1
TABLET, EXTENDED RELEASE ORAL
Qty: 90 TABLET | Refills: 3 | Status: SHIPPED | OUTPATIENT
Start: 2023-11-27

## 2023-12-20 ENCOUNTER — TELEPHONE (OUTPATIENT)
Dept: PULMONOLOGY | Age: 83
End: 2023-12-20

## 2024-01-02 RX ORDER — AMLODIPINE BESYLATE 5 MG/1
5 TABLET ORAL DAILY
Qty: 90 TABLET | Refills: 3 | Status: SHIPPED | OUTPATIENT
Start: 2024-01-02

## 2024-01-03 RX ORDER — AMLODIPINE BESYLATE 5 MG/1
TABLET ORAL
Qty: 90 TABLET | Refills: 3 | OUTPATIENT
Start: 2024-01-03

## 2024-02-06 RX ORDER — FUROSEMIDE 40 MG/1
40 TABLET ORAL DAILY
Qty: 90 TABLET | Refills: 1 | Status: SHIPPED | OUTPATIENT
Start: 2024-02-06

## 2024-02-20 SDOH — HEALTH STABILITY: PHYSICAL HEALTH: ON AVERAGE, HOW MANY MINUTES DO YOU ENGAGE IN EXERCISE AT THIS LEVEL?: 90 MIN

## 2024-02-20 SDOH — HEALTH STABILITY: PHYSICAL HEALTH: ON AVERAGE, HOW MANY DAYS PER WEEK DO YOU ENGAGE IN MODERATE TO STRENUOUS EXERCISE (LIKE A BRISK WALK)?: 5 DAYS

## 2024-02-20 ASSESSMENT — LIFESTYLE VARIABLES
HOW MANY STANDARD DRINKS CONTAINING ALCOHOL DO YOU HAVE ON A TYPICAL DAY: PATIENT DOES NOT DRINK
HOW MANY STANDARD DRINKS CONTAINING ALCOHOL DO YOU HAVE ON A TYPICAL DAY: 0
HOW OFTEN DO YOU HAVE A DRINK CONTAINING ALCOHOL: NEVER
HOW OFTEN DO YOU HAVE A DRINK CONTAINING ALCOHOL: 1
HOW OFTEN DO YOU HAVE SIX OR MORE DRINKS ON ONE OCCASION: 1

## 2024-02-20 ASSESSMENT — PATIENT HEALTH QUESTIONNAIRE - PHQ9
SUM OF ALL RESPONSES TO PHQ QUESTIONS 1-9: 0
SUM OF ALL RESPONSES TO PHQ9 QUESTIONS 1 & 2: 0
SUM OF ALL RESPONSES TO PHQ QUESTIONS 1-9: 0
1. LITTLE INTEREST OR PLEASURE IN DOING THINGS: 0
2. FEELING DOWN, DEPRESSED OR HOPELESS: 0

## 2024-02-23 ENCOUNTER — OFFICE VISIT (OUTPATIENT)
Dept: FAMILY MEDICINE CLINIC | Facility: CLINIC | Age: 84
End: 2024-02-23

## 2024-02-23 VITALS
SYSTOLIC BLOOD PRESSURE: 120 MMHG | BODY MASS INDEX: 27.46 KG/M2 | HEIGHT: 68 IN | DIASTOLIC BLOOD PRESSURE: 62 MMHG | WEIGHT: 181.2 LBS

## 2024-02-23 DIAGNOSIS — I10 PRIMARY HYPERTENSION: ICD-10-CM

## 2024-02-23 DIAGNOSIS — F33.9 RECURRENT DEPRESSION (HCC): ICD-10-CM

## 2024-02-23 DIAGNOSIS — Z00.00 MEDICARE ANNUAL WELLNESS VISIT, SUBSEQUENT: Primary | ICD-10-CM

## 2024-02-23 DIAGNOSIS — I25.10 CORONARY ARTERY DISEASE INVOLVING NATIVE CORONARY ARTERY OF NATIVE HEART WITHOUT ANGINA PECTORIS: ICD-10-CM

## 2024-02-23 DIAGNOSIS — H92.01 ACUTE OTALGIA, RIGHT: ICD-10-CM

## 2024-02-23 DIAGNOSIS — H60.501 ACUTE OTITIS EXTERNA OF RIGHT EAR, UNSPECIFIED TYPE: ICD-10-CM

## 2024-02-23 DIAGNOSIS — I30.0 RECURRENT IDIOPATHIC PERICARDITIS: ICD-10-CM

## 2024-02-23 DIAGNOSIS — E78.00 PURE HYPERCHOLESTEROLEMIA: ICD-10-CM

## 2024-02-23 DIAGNOSIS — R06.09 DOE (DYSPNEA ON EXERTION): ICD-10-CM

## 2024-02-23 LAB
ALBUMIN SERPL-MCNC: 3.8 G/DL (ref 3.2–4.6)
ALBUMIN/GLOB SERPL: 1.3 (ref 0.4–1.6)
ALP SERPL-CCNC: 98 U/L (ref 50–136)
ALT SERPL-CCNC: 29 U/L (ref 12–65)
ANION GAP SERPL CALC-SCNC: 3 MMOL/L (ref 2–11)
AST SERPL-CCNC: 25 U/L (ref 15–37)
BASOPHILS # BLD: 0.1 K/UL (ref 0–0.2)
BASOPHILS NFR BLD: 1 % (ref 0–2)
BILIRUB SERPL-MCNC: 2.2 MG/DL (ref 0.2–1.1)
BUN SERPL-MCNC: 17 MG/DL (ref 8–23)
CALCIUM SERPL-MCNC: 9.6 MG/DL (ref 8.3–10.4)
CHLORIDE SERPL-SCNC: 105 MMOL/L (ref 103–113)
CHOLEST SERPL-MCNC: 108 MG/DL
CO2 SERPL-SCNC: 32 MMOL/L (ref 21–32)
CREAT SERPL-MCNC: 0.9 MG/DL (ref 0.8–1.5)
DIFFERENTIAL METHOD BLD: NORMAL
EOSINOPHIL # BLD: 0.1 K/UL (ref 0–0.8)
EOSINOPHIL NFR BLD: 2 % (ref 0.5–7.8)
ERYTHROCYTE [DISTWIDTH] IN BLOOD BY AUTOMATED COUNT: 12.8 % (ref 11.9–14.6)
GLOBULIN SER CALC-MCNC: 2.9 G/DL (ref 2.8–4.5)
GLUCOSE SERPL-MCNC: 101 MG/DL (ref 65–100)
HCT VFR BLD AUTO: 43 % (ref 41.1–50.3)
HDLC SERPL-MCNC: 52 MG/DL (ref 40–60)
HDLC SERPL: 2.1
HGB BLD-MCNC: 13.6 G/DL (ref 13.6–17.2)
IMM GRANULOCYTES # BLD AUTO: 0 K/UL (ref 0–0.5)
IMM GRANULOCYTES NFR BLD AUTO: 0 % (ref 0–5)
LDLC SERPL CALC-MCNC: 43.8 MG/DL
LYMPHOCYTES # BLD: 2.4 K/UL (ref 0.5–4.6)
LYMPHOCYTES NFR BLD: 32 % (ref 13–44)
MCH RBC QN AUTO: 30.3 PG (ref 26.1–32.9)
MCHC RBC AUTO-ENTMCNC: 31.6 G/DL (ref 31.4–35)
MCV RBC AUTO: 95.8 FL (ref 82–102)
MONOCYTES # BLD: 0.5 K/UL (ref 0.1–1.3)
MONOCYTES NFR BLD: 7 % (ref 4–12)
NEUTS SEG # BLD: 4.4 K/UL (ref 1.7–8.2)
NEUTS SEG NFR BLD: 58 % (ref 43–78)
NRBC # BLD: 0 K/UL (ref 0–0.2)
PLATELET # BLD AUTO: 231 K/UL (ref 150–450)
PMV BLD AUTO: 10.7 FL (ref 9.4–12.3)
POTASSIUM SERPL-SCNC: 4.2 MMOL/L (ref 3.5–5.1)
PROT SERPL-MCNC: 6.7 G/DL (ref 6.3–8.2)
RBC # BLD AUTO: 4.49 M/UL (ref 4.23–5.6)
SODIUM SERPL-SCNC: 140 MMOL/L (ref 136–146)
TRIGL SERPL-MCNC: 61 MG/DL (ref 35–150)
TSH, 3RD GENERATION: 1.79 UIU/ML (ref 0.36–3.74)
VLDLC SERPL CALC-MCNC: 12.2 MG/DL (ref 6–23)
WBC # BLD AUTO: 7.5 K/UL (ref 4.3–11.1)

## 2024-02-23 RX ORDER — CIPROFLOXACIN AND DEXAMETHASONE 3; 1 MG/ML; MG/ML
4 SUSPENSION/ DROPS AURICULAR (OTIC) 2 TIMES DAILY
Qty: 7.5 ML | Refills: 0 | Status: SHIPPED | OUTPATIENT
Start: 2024-02-23

## 2024-02-23 NOTE — PROGRESS NOTES
Medicare Annual Wellness Visit    Aren Hinojosa is here for Medicare AWV (subs)    Assessment & Plan   Medicare annual wellness visit, subsequent  Recommendations for Preventive Services Due: see orders and patient instructions/AVS.  Recommended screening schedule for the next 5-10 years is provided to the patient in written form: see Patient Instructions/AVS.     No follow-ups on file.     Subjective    who has a past medical history significant for hypertension, CAD, recurrent pericarditis, history of GI bleed, peptic ulcer disease, BPH, reflux and high cholesterol     Patient's complete Health Risk Assessment and screening values have been reviewed and are found in Flowsheets. The following problems were reviewed today and where indicated follow up appointments were made and/or referrals ordered.    No Positive Risk Factors identified today.                                  Objective   Vitals:    02/23/24 0932   BP: 120/62   Weight: 82.2 kg (181 lb 3.2 oz)   Height: 1.727 m (5' 8\")      Body mass index is 27.55 kg/m².        General Appearance: alert and oriented to person, place and time, well developed and well- nourished, in no acute distress  Skin: warm and dry, no rash or erythema  Head: normocephalic and atraumatic  Eyes: pupils equal, round, and reactive to light, extraocular eye movements intact, conjunctivae normal  ENT: tympanic membrane, external ear and ear canal normal bilaterally, nose without deformity, nasal mucosa and turbinates normal without polyps  Neck: supple and non-tender without mass, no thyromegaly or thyroid nodules, no cervical lymphadenopathy  Pulmonary/Chest: clear to auscultation bilaterally- no wheezes, rales or rhonchi, normal air movement, no respiratory distress  Cardiovascular: normal rate, regular rhythm, normal S1 and S2, no murmurs, rubs, clicks, or gallops, distal pulses intact, no carotid bruits  Abdomen: soft, non-tender, non-distended, normal bowel sounds, no

## 2024-02-23 NOTE — PROGRESS NOTES
SUBJECTIVE:   Aren Hinojosa is a 83 y.o. male  who has a past medical history significant for hypertension, CAD, recurrent pericarditis, history of GI bleed, peptic ulcer disease, BPH, reflux and high cholesterol.  Review of systems reveals that he has been experiencing pain and fullness in his right ear for about 5 days.  Has been some yellowish drainage noted as well.  No fever or bodyaches.  No cough or congestion.  Patient denies chest pain, shortness of breath, orthopnea or PND.  GI and  review of systems reveals continued struggles with IBS symptoms.  He has been to the to the gastroenterologist but would like to potentially see someone different as he was dissatisfied with this appointment.    Patient has successfully weaned himself off of Lexapro.  Please refer to prior notes for details.  Feeling great with no breakthrough symptoms.    HPI  See above    Past Medical History, Past Surgical History, Family history, Social History, and Medications were all reviewed with the patient today and updated as necessary.       Current Outpatient Medications   Medication Sig Dispense Refill    ciprofloxacin-dexAMETHasone (CIPRODEX) 0.3-0.1 % otic suspension Place 4 drops into the right ear 2 times daily 7.5 mL 0    furosemide (LASIX) 40 MG tablet TAKE 1 TABLET BY MOUTH EVERY DAY 90 tablet 1    amLODIPine (NORVASC) 5 MG tablet Take 1 tablet by mouth daily 90 tablet 3    isosorbide mononitrate (IMDUR) 30 MG extended release tablet TAKE 1 TABLET DAILY FOR CHRONIC STABLE ANGINA PECTORIS 90 tablet 3    pantoprazole (PROTONIX) 40 MG tablet TAKE 1 TABLET BY MOUTH DAILY 90 tablet 3    atorvastatin (LIPITOR) 40 MG tablet Take 1 tablet by mouth daily TAKE 1 TABLET DAILY 90 tablet 3    Polyethylene Glycol 3350 (MIRALAX PO) Take by mouth      clopidogrel (PLAVIX) 75 MG tablet TAKE 1 TABLET BY MOUTH EVERY DAY 90 tablet 3    escitalopram (LEXAPRO) 10 MG tablet Take 1 tablet by mouth daily 30 tablet 5    pramipexole (MIRAPEX)

## 2024-03-25 ENCOUNTER — OFFICE VISIT (OUTPATIENT)
Dept: PULMONOLOGY | Age: 84
End: 2024-03-25
Payer: MEDICARE

## 2024-03-25 ENCOUNTER — NURSE ONLY (OUTPATIENT)
Dept: PULMONOLOGY | Age: 84
End: 2024-03-25
Payer: MEDICARE

## 2024-03-25 VITALS
SYSTOLIC BLOOD PRESSURE: 120 MMHG | HEART RATE: 55 BPM | TEMPERATURE: 98.1 F | OXYGEN SATURATION: 96 % | WEIGHT: 175 LBS | RESPIRATION RATE: 18 BRPM | BODY MASS INDEX: 26.52 KG/M2 | HEIGHT: 68 IN | DIASTOLIC BLOOD PRESSURE: 68 MMHG

## 2024-03-25 DIAGNOSIS — J98.11 ATELECTASIS: Primary | ICD-10-CM

## 2024-03-25 DIAGNOSIS — R94.2 DECREASED DIFFUSION CAPACITY: ICD-10-CM

## 2024-03-25 DIAGNOSIS — R06.02 SHORTNESS OF BREATH: Primary | ICD-10-CM

## 2024-03-25 DIAGNOSIS — J98.6 PARALYZED HEMIDIAPHRAGM: ICD-10-CM

## 2024-03-25 DIAGNOSIS — R06.02 SHORTNESS OF BREATH: ICD-10-CM

## 2024-03-25 LAB
FEF25-27, POC: 2.48 L/S
FET, POC: NORMAL
FEV 1 , POC: 2.57 L
FEV1/FVC, POC: NORMAL
FVC, POC: NORMAL
LUNG AGE, POC: NORMAL
PEF, POC: 5.09 L/S

## 2024-03-25 PROCEDURE — 94726 PLETHYSMOGRAPHY LUNG VOLUMES: CPT | Performed by: INTERNAL MEDICINE

## 2024-03-25 PROCEDURE — G8484 FLU IMMUNIZE NO ADMIN: HCPCS | Performed by: INTERNAL MEDICINE

## 2024-03-25 PROCEDURE — 1123F ACP DISCUSS/DSCN MKR DOCD: CPT | Performed by: INTERNAL MEDICINE

## 2024-03-25 PROCEDURE — 94729 DIFFUSING CAPACITY: CPT | Performed by: INTERNAL MEDICINE

## 2024-03-25 PROCEDURE — 1036F TOBACCO NON-USER: CPT | Performed by: INTERNAL MEDICINE

## 2024-03-25 PROCEDURE — 99214 OFFICE O/P EST MOD 30 MIN: CPT | Performed by: INTERNAL MEDICINE

## 2024-03-25 PROCEDURE — G8417 CALC BMI ABV UP PARAM F/U: HCPCS | Performed by: INTERNAL MEDICINE

## 2024-03-25 PROCEDURE — G8428 CUR MEDS NOT DOCUMENT: HCPCS | Performed by: INTERNAL MEDICINE

## 2024-03-25 PROCEDURE — 94010 BREATHING CAPACITY TEST: CPT | Performed by: INTERNAL MEDICINE

## 2024-03-25 NOTE — PROGRESS NOTES
[facility]  [address]  [place] [address]  [phone]    Patient Name:  Aren Hinojosa  YOB: 1940      Office Visit 3/25/2024    LOV 2/3/2023 with Jatinder-IRVIN    CHIEF COMPLAINT:    No chief complaint on file.          HISTORY OF PRESENT ILLNESS:    Previously seen by:  Robert Mayorga MD on 10/21/22  This is a delightful 82-year-old white male that looks younger than his age very active person who presents for evaluation of dyspnea on exertion since around May of this year.  He does not remember any inciting factors he does notice that with the usual exertion that he is used to he can perform the task but it is resulting in him having dyspnea.  He had a cardiac evaluation which was completely normal apparently and he is here for further evaluation to see whether there is any underlying pulmonary disease to cause this phenomenon.  He is aware of an elevated right hemidiaphragm for at least 8 years, he has had no recent infections that he can speak off and he does not remember any triggers around May to cause him to have sudden onset of problems.  He denies any fevers, chills, coughing, wheezing or chest pain.  As I mentioned he still able to perform the tasks go to the gym and exert himself but he feels that he is more short of breath than usual when he does these things.  He denies any weight gain he denies any weight loss, he has obstructive sleep apnea and is compliant with CPAP therapy.  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  He has no exposure to birds.  PLAN:  Encounter Diagnosis   Name Primary?    Shortness of breath Yes   The

## 2024-03-26 ENCOUNTER — TELEMEDICINE (OUTPATIENT)
Dept: FAMILY MEDICINE CLINIC | Facility: CLINIC | Age: 84
End: 2024-03-26
Payer: MEDICARE

## 2024-03-26 DIAGNOSIS — E80.6 HYPERBILIRUBINEMIA: ICD-10-CM

## 2024-03-26 DIAGNOSIS — I10 PRIMARY HYPERTENSION: ICD-10-CM

## 2024-03-26 DIAGNOSIS — F33.9 RECURRENT DEPRESSION (HCC): ICD-10-CM

## 2024-03-26 DIAGNOSIS — I25.10 CORONARY ARTERY DISEASE INVOLVING NATIVE CORONARY ARTERY OF NATIVE HEART WITHOUT ANGINA PECTORIS: ICD-10-CM

## 2024-03-26 DIAGNOSIS — E78.00 PURE HYPERCHOLESTEROLEMIA: Primary | ICD-10-CM

## 2024-03-26 PROCEDURE — 99443 PR PHYS/QHP TELEPHONE EVALUATION 21-30 MIN: CPT | Performed by: FAMILY MEDICINE

## 2024-03-26 SDOH — ECONOMIC STABILITY: INCOME INSECURITY: HOW HARD IS IT FOR YOU TO PAY FOR THE VERY BASICS LIKE FOOD, HOUSING, MEDICAL CARE, AND HEATING?: NOT HARD AT ALL

## 2024-03-26 SDOH — ECONOMIC STABILITY: FOOD INSECURITY: WITHIN THE PAST 12 MONTHS, THE FOOD YOU BOUGHT JUST DIDN'T LAST AND YOU DIDN'T HAVE MONEY TO GET MORE.: NEVER TRUE

## 2024-03-26 SDOH — ECONOMIC STABILITY: FOOD INSECURITY: WITHIN THE PAST 12 MONTHS, YOU WORRIED THAT YOUR FOOD WOULD RUN OUT BEFORE YOU GOT MONEY TO BUY MORE.: NEVER TRUE

## 2024-03-26 ASSESSMENT — PATIENT HEALTH QUESTIONNAIRE - PHQ9
1. LITTLE INTEREST OR PLEASURE IN DOING THINGS: NOT AT ALL
2. FEELING DOWN, DEPRESSED OR HOPELESS: NOT AT ALL
SUM OF ALL RESPONSES TO PHQ QUESTIONS 1-9: 0
SUM OF ALL RESPONSES TO PHQ9 QUESTIONS 1 & 2: 0

## 2024-03-26 NOTE — PROGRESS NOTES
SUBJECTIVE:   Aren Hinojosa is a 83 y.o. male who has a past medical history significant for hypertension, CAD, recurrent pericarditis, history of GI bleed, peptic ulcer disease, BPH, reflux and high cholesterol.  Patient presents for a virtual visit via telephone encounter.Review of systems reveals no complaints of chest pain, shortness of breath, orthopnea or PND.  GI and  review of systems is unremarkable.  Current medications are listed in the EMR and reviewed today.    Patient's vital signs were not performed as encounter was performed virtually.  Location of virtual visit was patient's home.      HPI  See above    Past Medical History, Past Surgical History, Family history, Social History, and Medications were all reviewed with the patient today and updated as necessary.       Current Outpatient Medications   Medication Sig Dispense Refill    furosemide (LASIX) 40 MG tablet TAKE 1 TABLET BY MOUTH EVERY DAY 90 tablet 1    amLODIPine (NORVASC) 5 MG tablet Take 1 tablet by mouth daily 90 tablet 3    isosorbide mononitrate (IMDUR) 30 MG extended release tablet TAKE 1 TABLET DAILY FOR CHRONIC STABLE ANGINA PECTORIS 90 tablet 3    pantoprazole (PROTONIX) 40 MG tablet TAKE 1 TABLET BY MOUTH DAILY 90 tablet 3    atorvastatin (LIPITOR) 40 MG tablet Take 1 tablet by mouth daily TAKE 1 TABLET DAILY 90 tablet 3    Polyethylene Glycol 3350 (MIRALAX PO) Take by mouth      clopidogrel (PLAVIX) 75 MG tablet TAKE 1 TABLET BY MOUTH EVERY DAY 90 tablet 3    pramipexole (MIRAPEX) 0.5 MG tablet Take 1 tablet by mouth nightly 90 tablet 3    metoprolol succinate (TOPROL XL) 100 MG extended release tablet TAKE 1 TABLET ONCE DAILY 90 tablet 3    Cholecalciferol (VITAMIN D3) 50 MCG (2000 UT) CAPS Take by mouth      acetaminophen (TYLENOL) 650 MG extended release tablet Take 1 tablet by mouth every 6 hours as needed      aspirin 81 MG EC tablet Take 1 tablet by mouth daily      losartan (COZAAR) 100 MG tablet Take 0.5 tablets by

## 2024-05-31 RX ORDER — PRAMIPEXOLE DIHYDROCHLORIDE 0.5 MG/1
0.5 TABLET ORAL NIGHTLY
Qty: 90 TABLET | Refills: 3 | Status: SHIPPED | OUTPATIENT
Start: 2024-05-31

## 2024-07-16 RX ORDER — METOPROLOL SUCCINATE 100 MG/1
TABLET, EXTENDED RELEASE ORAL
Qty: 90 TABLET | Refills: 3 | Status: SHIPPED | OUTPATIENT
Start: 2024-07-16

## 2024-08-05 RX ORDER — FUROSEMIDE 40 MG/1
40 TABLET ORAL DAILY
Qty: 90 TABLET | Refills: 0 | Status: SHIPPED | OUTPATIENT
Start: 2024-08-05

## 2024-10-09 RX ORDER — PANTOPRAZOLE SODIUM 40 MG/1
TABLET, DELAYED RELEASE ORAL
Qty: 90 TABLET | Refills: 0 | Status: SHIPPED | OUTPATIENT
Start: 2024-10-09

## 2024-10-09 RX ORDER — ATORVASTATIN CALCIUM 40 MG/1
40 TABLET, FILM COATED ORAL DAILY
Qty: 90 TABLET | Refills: 0 | Status: SHIPPED | OUTPATIENT
Start: 2024-10-09

## 2024-10-16 ENCOUNTER — TELEMEDICINE (OUTPATIENT)
Dept: FAMILY MEDICINE CLINIC | Facility: CLINIC | Age: 84
End: 2024-10-16

## 2024-10-16 DIAGNOSIS — Z80.1 FAMILY HISTORY OF LUNG CANCER: Primary | ICD-10-CM

## 2024-10-16 DIAGNOSIS — I25.10 CORONARY ARTERY DISEASE INVOLVING NATIVE CORONARY ARTERY OF NATIVE HEART WITHOUT ANGINA PECTORIS: ICD-10-CM

## 2024-10-16 RX ORDER — CHLORTHALIDONE 25 MG/1
25 TABLET ORAL DAILY
COMMUNITY
Start: 2024-09-23

## 2024-10-16 NOTE — PROGRESS NOTES
reflux disease)     managed with PRN medications    GI bleed     from Plavix    Heart murmur     History of depression     Hypercholesterolemia     managed with meds    Hypertension     controlled w/med    IBS (irritable bowel syndrome)     manged with medications    Ill-defined condition     pericarditis    Macular degeneration     managed with medications    Macular degeneration     Non-STEMI (non-ST elevated myocardial infarction) (HCC) 2015    Old MI (myocardial infarction)     x2; last 2014    Osteoarthritis     in shoulders and back    Pleural effusion     PUD (peptic ulcer disease) 3/2011    no recent episodes    PUD (peptic ulcer disease)     Restless leg syndrome     manged with medications    Seasonal allergic rhinitis     PRN medications    Unspecified sleep apnea     wears cpap     Past Surgical History:   Procedure Laterality Date    ADENOIDECTOMY      CARDIAC CATHETERIZATION  2014    no stent required    CARDIAC CATHETERIZATION  , 3/2011,2011    (1)stent      CATARACT REMOVAL Bilateral     COLONOSCOPY      SHOULDER ARTHROPLASTY Right 2015    SHOULDER ARTHROSCOPY Right  approx    SHOULDER ARTHROSCOPY Left 2017    TONSILLECTOMY AND ADENOIDECTOMY      as a child    TURP  05/15; 07/15     Family History   Problem Relation Age of Onset    High Cholesterol Mother     Heart Disease Mother     Hypertension Brother     Heart Disease Brother     Cancer Father         lung cancer--    Coronary Art Dis Mother     Hypertension Mother      Social History     Tobacco Use    Smoking status: Former     Types: Pipe     Quit date:      Years since quittin.8    Smokeless tobacco: Never    Tobacco comments:     Quit smoking: quit smoking pipe --- no cigs   Substance Use Topics    Alcohol use: No         Review of Systems  See above    OBJECTIVE:  There were no vitals taken for this visit.     Physical Exam    Medical problems and test results were reviewed with the patient

## 2024-10-23 ENCOUNTER — TELEPHONE (OUTPATIENT)
Dept: FAMILY MEDICINE CLINIC | Facility: CLINIC | Age: 84
End: 2024-10-23

## 2024-10-23 NOTE — TELEPHONE ENCOUNTER
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  Mr Hinojosa wants to know if you can refer him based on Dr Angel opinion or does he need an appointment with you so you can evaluate him? I tried to find this in the office note but I couldn't find the recommendation.

## 2024-10-25 ENCOUNTER — OFFICE VISIT (OUTPATIENT)
Dept: FAMILY MEDICINE CLINIC | Facility: CLINIC | Age: 84
End: 2024-10-25

## 2024-10-25 ENCOUNTER — LAB (OUTPATIENT)
Dept: FAMILY MEDICINE CLINIC | Facility: CLINIC | Age: 84
End: 2024-10-25

## 2024-10-25 VITALS
HEART RATE: 72 BPM | OXYGEN SATURATION: 96 % | BODY MASS INDEX: 26.52 KG/M2 | SYSTOLIC BLOOD PRESSURE: 120 MMHG | DIASTOLIC BLOOD PRESSURE: 60 MMHG | WEIGHT: 175 LBS | HEIGHT: 68 IN

## 2024-10-25 DIAGNOSIS — E80.6 HYPERBILIRUBINEMIA: ICD-10-CM

## 2024-10-25 DIAGNOSIS — I10 PRIMARY HYPERTENSION: ICD-10-CM

## 2024-10-25 DIAGNOSIS — E78.00 PURE HYPERCHOLESTEROLEMIA: ICD-10-CM

## 2024-10-25 DIAGNOSIS — R20.0 LEFT ARM NUMBNESS: ICD-10-CM

## 2024-10-25 DIAGNOSIS — Z87.19 HISTORY OF GI BLEED: ICD-10-CM

## 2024-10-25 DIAGNOSIS — I25.10 CORONARY ARTERY DISEASE INVOLVING NATIVE CORONARY ARTERY OF NATIVE HEART WITHOUT ANGINA PECTORIS: ICD-10-CM

## 2024-10-25 DIAGNOSIS — M54.2 NECK PAIN: Primary | ICD-10-CM

## 2024-10-25 DIAGNOSIS — Z87.11 HISTORY OF PEPTIC ULCER: ICD-10-CM

## 2024-10-25 LAB
ALBUMIN SERPL-MCNC: 3.7 G/DL (ref 3.2–4.6)
ALBUMIN/GLOB SERPL: 1.2 (ref 1–1.9)
ALP SERPL-CCNC: 108 U/L (ref 40–129)
ALT SERPL-CCNC: 25 U/L (ref 8–55)
ANION GAP SERPL CALC-SCNC: 10 MMOL/L (ref 9–18)
AST SERPL-CCNC: 37 U/L (ref 15–37)
BILIRUB SERPL-MCNC: 1.7 MG/DL (ref 0–1.2)
BUN SERPL-MCNC: 17 MG/DL (ref 8–23)
CALCIUM SERPL-MCNC: 9.5 MG/DL (ref 8.8–10.2)
CHLORIDE SERPL-SCNC: 98 MMOL/L (ref 98–107)
CHOLEST SERPL-MCNC: 131 MG/DL (ref 0–200)
CO2 SERPL-SCNC: 32 MMOL/L (ref 20–28)
CREAT SERPL-MCNC: 0.92 MG/DL (ref 0.8–1.3)
GLOBULIN SER CALC-MCNC: 3 G/DL (ref 2.3–3.5)
GLUCOSE SERPL-MCNC: 89 MG/DL (ref 70–99)
HDLC SERPL-MCNC: 59 MG/DL (ref 40–60)
HDLC SERPL: 2.2 (ref 0–5)
LDLC SERPL CALC-MCNC: 48 MG/DL (ref 0–100)
POTASSIUM SERPL-SCNC: 3.6 MMOL/L (ref 3.5–5.1)
PROT SERPL-MCNC: 6.7 G/DL (ref 6.3–8.2)
SODIUM SERPL-SCNC: 141 MMOL/L (ref 136–145)
TRIGL SERPL-MCNC: 120 MG/DL (ref 0–150)
VLDLC SERPL CALC-MCNC: 24 MG/DL (ref 6–23)

## 2024-10-25 ASSESSMENT — PATIENT HEALTH QUESTIONNAIRE - PHQ9
SUM OF ALL RESPONSES TO PHQ QUESTIONS 1-9: 0
1. LITTLE INTEREST OR PLEASURE IN DOING THINGS: NOT AT ALL
SUM OF ALL RESPONSES TO PHQ QUESTIONS 1-9: 0
2. FEELING DOWN, DEPRESSED OR HOPELESS: NOT AT ALL
SUM OF ALL RESPONSES TO PHQ9 QUESTIONS 1 & 2: 0
SUM OF ALL RESPONSES TO PHQ QUESTIONS 1-9: 0
SUM OF ALL RESPONSES TO PHQ QUESTIONS 1-9: 0

## 2024-10-25 NOTE — PROGRESS NOTES
\"Have you been to the ER, urgent care clinic since your last visit?  Hospitalized since your last visit?\"    NO    “Have you seen or consulted any other health care providers outside our system since your last visit?”    YES - When: approximately 1  weeks ago.  Where and Why: Orthopedic neck pain.

## 2024-10-25 NOTE — PROGRESS NOTES
SUBJECTIVE:   Aren Hinojosa is a 84 y.o. male  who has a past medical history significant for hypertension, CAD, recurrent pericarditis, history of GI bleed, peptic ulcer disease, BPH, reflux and high cholesterol.  Patient presents today reporting that for roughly 2 weeks he has been experiencing worsening left upper extremity numbness and tingling with left neck pain.  He states that he has had some left neck pain and slight tingling for about a year but in the last 2 weeks it seems of gotten much worse.  This coincides potentially coincidentally with an injection in his left biceps tendon that he got for biceps tendinitis.  However he states that both he and his orthopedist who did the injection do not believe that this is connected and that it is potentially cervical in nature.  Patient reports no dropping of objects or loss of upper extremity strength.    HPI  See above    Past Medical History, Past Surgical History, Family history, Social History, and Medications were all reviewed with the patient today and updated as necessary.       Current Outpatient Medications   Medication Sig Dispense Refill    chlorthalidone (HYGROTON) 25 MG tablet Take 1 tablet by mouth daily      pantoprazole (PROTONIX) 40 MG tablet TAKE 1 TABLET BY MOUTH EVERY DAY 90 tablet 0    atorvastatin (LIPITOR) 40 MG tablet TAKE 1 TABLET BY MOUTH EVERY DAY 90 tablet 0    furosemide (LASIX) 40 MG tablet Take 1 tablet by mouth daily 90 tablet 0    metoprolol succinate (TOPROL XL) 100 MG extended release tablet TAKE 1 TABLET ONCE DAILY 90 tablet 3    pramipexole (MIRAPEX) 0.5 MG tablet Take 1 tablet by mouth nightly 90 tablet 3    amLODIPine (NORVASC) 5 MG tablet Take 1 tablet by mouth daily 90 tablet 3    isosorbide mononitrate (IMDUR) 30 MG extended release tablet TAKE 1 TABLET DAILY FOR CHRONIC STABLE ANGINA PECTORIS 90 tablet 3    Polyethylene Glycol 3350 (MIRALAX PO) Take by mouth      clopidogrel (PLAVIX) 75 MG tablet TAKE 1 TABLET BY

## 2024-10-28 RX ORDER — PRAMIPEXOLE DIHYDROCHLORIDE 0.5 MG/1
0.5 TABLET ORAL NIGHTLY
Qty: 90 TABLET | Refills: 3 | OUTPATIENT
Start: 2024-10-28

## 2024-10-30 ENCOUNTER — OFFICE VISIT (OUTPATIENT)
Age: 84
End: 2024-10-30
Payer: MEDICARE

## 2024-10-30 VITALS — WEIGHT: 176.2 LBS | HEIGHT: 67 IN | BODY MASS INDEX: 27.65 KG/M2

## 2024-10-30 DIAGNOSIS — M54.12 CERVICAL RADICULOPATHY: Primary | ICD-10-CM

## 2024-10-30 PROCEDURE — 99203 OFFICE O/P NEW LOW 30 MIN: CPT | Performed by: ORTHOPAEDIC SURGERY

## 2024-10-30 PROCEDURE — G8417 CALC BMI ABV UP PARAM F/U: HCPCS | Performed by: ORTHOPAEDIC SURGERY

## 2024-10-30 PROCEDURE — G8484 FLU IMMUNIZE NO ADMIN: HCPCS | Performed by: ORTHOPAEDIC SURGERY

## 2024-10-30 PROCEDURE — 1159F MED LIST DOCD IN RCRD: CPT | Performed by: ORTHOPAEDIC SURGERY

## 2024-10-30 PROCEDURE — 1123F ACP DISCUSS/DSCN MKR DOCD: CPT | Performed by: ORTHOPAEDIC SURGERY

## 2024-10-30 PROCEDURE — 1036F TOBACCO NON-USER: CPT | Performed by: ORTHOPAEDIC SURGERY

## 2024-10-30 PROCEDURE — G8427 DOCREV CUR MEDS BY ELIG CLIN: HCPCS | Performed by: ORTHOPAEDIC SURGERY

## 2024-10-30 RX ORDER — FUROSEMIDE 40 MG/1
40 TABLET ORAL DAILY
Qty: 90 TABLET | Refills: 3 | Status: SHIPPED | OUTPATIENT
Start: 2024-10-30

## 2024-10-30 RX ORDER — CLOPIDOGREL BISULFATE 75 MG/1
75 TABLET ORAL DAILY
Qty: 90 TABLET | Refills: 3 | Status: SHIPPED | OUTPATIENT
Start: 2024-10-30

## 2024-10-30 NOTE — PROGRESS NOTES
Name: Aren Hinojosa  YOB: 1940  Gender: male  MRN: [id number]  Age: 84 y.o.    Chief Complaint: Neck pain and left arm pain  History of present illness:    This is a very pleasant 84 y.o. male who presents with acute history of neck pain with radiation to the left lateral elbow.  This been going on for 3 weeks.  Pain can be severe at time.  Denies any injury.  He does get burning in this distribution.  He states sometimes he has unsteady balance.  He currently is on Plavix and aspirin due to coronary disease.  Of note he does have a biceps tendon tear on that side that was injected with cortisone 2 weeks ago.  He continues to use lidocaine patches and topical.  His symptoms are worse with movement and activity      Medications:     Prior to Visit Medications    Medication Sig Taking? Authorizing Provider   chlorthalidone (HYGROTON) 25 MG tablet Take 1 tablet by mouth daily Yes Deng Knowles MD   pantoprazole (PROTONIX) 40 MG tablet TAKE 1 TABLET BY MOUTH EVERY DAY Yes David Ashraf MD   atorvastatin (LIPITOR) 40 MG tablet TAKE 1 TABLET BY MOUTH EVERY DAY Yes David Ashraf MD   metoprolol succinate (TOPROL XL) 100 MG extended release tablet TAKE 1 TABLET ONCE DAILY Yes David Ashraf MD   pramipexole (MIRAPEX) 0.5 MG tablet Take 1 tablet by mouth nightly Yes David Ashraf MD   isosorbide mononitrate (IMDUR) 30 MG extended release tablet TAKE 1 TABLET DAILY FOR CHRONIC STABLE ANGINA PECTORIS Yes David Ashraf MD   Polyethylene Glycol 3350 (MIRALAX PO) Take by mouth Yes Deng Knowles MD   Cholecalciferol (VITAMIN D3) 50 MCG (2000 UT) CAPS Take by mouth Yes Deng Knowles MD   acetaminophen (TYLENOL) 650 MG extended release tablet Take 1 tablet by mouth every 6 hours as needed Yes Automatic Reconciliation, Ar   aspirin 81 MG EC tablet Take 1 tablet by mouth daily Yes Automatic Reconciliation, Ar   losartan (COZAAR) 100 MG tablet Take 0.5 tablets by mouth

## 2024-11-08 DIAGNOSIS — J98.11 ATELECTASIS: Primary | ICD-10-CM

## 2024-11-12 ENCOUNTER — OFFICE VISIT (OUTPATIENT)
Dept: FAMILY MEDICINE CLINIC | Facility: CLINIC | Age: 84
End: 2024-11-12

## 2024-11-12 VITALS
BODY MASS INDEX: 27.07 KG/M2 | WEIGHT: 178.6 LBS | SYSTOLIC BLOOD PRESSURE: 122 MMHG | HEART RATE: 59 BPM | OXYGEN SATURATION: 96 % | HEIGHT: 68 IN | DIASTOLIC BLOOD PRESSURE: 50 MMHG

## 2024-11-12 DIAGNOSIS — M54.2 NECK PAIN: Primary | ICD-10-CM

## 2024-11-12 DIAGNOSIS — I10 PRIMARY HYPERTENSION: ICD-10-CM

## 2024-11-12 DIAGNOSIS — I25.10 CORONARY ARTERY DISEASE INVOLVING NATIVE CORONARY ARTERY OF NATIVE HEART WITHOUT ANGINA PECTORIS: ICD-10-CM

## 2024-11-12 DIAGNOSIS — E78.00 PURE HYPERCHOLESTEROLEMIA: ICD-10-CM

## 2024-11-12 DIAGNOSIS — E80.6 HYPERBILIRUBINEMIA: ICD-10-CM

## 2024-11-12 DIAGNOSIS — F33.9 RECURRENT DEPRESSION (HCC): ICD-10-CM

## 2024-11-12 RX ORDER — ISOSORBIDE MONONITRATE 30 MG/1
TABLET, EXTENDED RELEASE ORAL
Qty: 90 TABLET | Refills: 3 | Status: SHIPPED | OUTPATIENT
Start: 2024-11-12

## 2024-11-12 ASSESSMENT — PATIENT HEALTH QUESTIONNAIRE - PHQ9
SUM OF ALL RESPONSES TO PHQ QUESTIONS 1-9: 0
SUM OF ALL RESPONSES TO PHQ9 QUESTIONS 1 & 2: 0
2. FEELING DOWN, DEPRESSED OR HOPELESS: NOT AT ALL
SUM OF ALL RESPONSES TO PHQ QUESTIONS 1-9: 0
1. LITTLE INTEREST OR PLEASURE IN DOING THINGS: NOT AT ALL

## 2024-11-12 NOTE — PROGRESS NOTES
\"Have you been to the ER, urgent care clinic since your last visit?  Hospitalized since your last visit?\"    NO    “Have you seen or consulted any other health care providers outside our system since your last visit?”    NO          
   Heart murmur     History of depression     Hypercholesterolemia     managed with meds    Hypertension     controlled w/med    IBS (irritable bowel syndrome)     manged with medications    Ill-defined condition     pericarditis    Macular degeneration     managed with medications    Macular degeneration     Non-STEMI (non-ST elevated myocardial infarction) (HCC) 2015    Old MI (myocardial infarction)     x2; last 2014    Osteoarthritis     in shoulders and back    Pleural effusion     PUD (peptic ulcer disease) 3/2011    no recent episodes    PUD (peptic ulcer disease)     Restless leg syndrome     manged with medications    Seasonal allergic rhinitis     PRN medications    Unspecified sleep apnea     wears cpap     Past Surgical History:   Procedure Laterality Date    ADENOIDECTOMY      CARDIAC CATHETERIZATION  2014    no stent required    CARDIAC CATHETERIZATION  , 3/2011,2011    (1)stent 2010     CATARACT REMOVAL Bilateral 2009    COLONOSCOPY      SHOULDER ARTHROPLASTY Right 2015    SHOULDER ARTHROSCOPY Right  approx    SHOULDER ARTHROSCOPY Left 2017    TONSILLECTOMY AND ADENOIDECTOMY      as a child    TURP  05/15; 07/15     Family History   Problem Relation Age of Onset    High Cholesterol Mother     Heart Disease Mother     Hypertension Brother     Heart Disease Brother     Cancer Father         lung cancer--    Coronary Art Dis Mother     Hypertension Mother      Social History     Tobacco Use    Smoking status: Former     Types: Pipe     Quit date:      Years since quittin.8    Smokeless tobacco: Never    Tobacco comments:     Quit smoking: quit smoking pipe --- no cigs   Substance Use Topics    Alcohol use: No         Review of Systems  See above    OBJECTIVE:  BP (!) 122/50   Pulse 59   Ht 1.727 m (5' 8\")   Wt 81 kg (178 lb 9.6 oz)   SpO2 96%   BMI 27.16 kg/m²      Physical Exam  Constitutional:       General: He is not in acute distress.     Appearance: Normal

## 2024-11-18 ENCOUNTER — HOSPITAL ENCOUNTER (OUTPATIENT)
Dept: CT IMAGING | Age: 84
Discharge: HOME | End: 2024-11-21
Attending: INTERNAL MEDICINE
Payer: MEDICARE

## 2024-11-18 DIAGNOSIS — J98.11 ATELECTASIS: ICD-10-CM

## 2024-11-18 PROCEDURE — 71250 CT THORAX DX C-: CPT

## 2024-11-25 ENCOUNTER — TELEPHONE (OUTPATIENT)
Dept: ORTHOPEDIC SURGERY | Age: 84
End: 2024-11-25

## 2024-12-31 RX ORDER — ATORVASTATIN CALCIUM 40 MG/1
40 TABLET, FILM COATED ORAL DAILY
Qty: 90 TABLET | Refills: 0 | Status: SHIPPED | OUTPATIENT
Start: 2024-12-31

## 2024-12-31 NOTE — TELEPHONE ENCOUNTER
Refill request     atorvastatin (LIPITOR) 40 MG tablet qty 90; 0 refills    [facility]/pharmacy #[id number] - [place], [place] - [address] - P [phone] - F [phone]

## 2025-01-02 RX ORDER — ISOSORBIDE MONONITRATE 30 MG/1
TABLET, EXTENDED RELEASE ORAL
Qty: 90 TABLET | Refills: 3 | OUTPATIENT
Start: 2025-01-02

## 2025-01-31 ENCOUNTER — TELEPHONE (OUTPATIENT)
Dept: PULMONOLOGY | Age: 85
End: 2025-01-31

## 2025-01-31 NOTE — TELEPHONE ENCOUNTER
LVM for the patient to return call                                                       Return in about 1 year (around 3/25/2025) for With [facility], CPFT per [facility] CPFT is to be done here in this office same machine not hospital.   WT 1/31/25

## 2025-02-24 SDOH — ECONOMIC STABILITY: INCOME INSECURITY: IN THE LAST 12 MONTHS, WAS THERE A TIME WHEN YOU WERE NOT ABLE TO PAY THE MORTGAGE OR RENT ON TIME?: NO

## 2025-02-24 SDOH — ECONOMIC STABILITY: FOOD INSECURITY: WITHIN THE PAST 12 MONTHS, THE FOOD YOU BOUGHT JUST DIDN'T LAST AND YOU DIDN'T HAVE MONEY TO GET MORE.: NEVER TRUE

## 2025-02-24 SDOH — ECONOMIC STABILITY: FOOD INSECURITY: WITHIN THE PAST 12 MONTHS, YOU WORRIED THAT YOUR FOOD WOULD RUN OUT BEFORE YOU GOT MONEY TO BUY MORE.: NEVER TRUE

## 2025-02-24 SDOH — ECONOMIC STABILITY: TRANSPORTATION INSECURITY
IN THE PAST 12 MONTHS, HAS THE LACK OF TRANSPORTATION KEPT YOU FROM MEDICAL APPOINTMENTS OR FROM GETTING MEDICATIONS?: NO

## 2025-02-24 SDOH — HEALTH STABILITY: PHYSICAL HEALTH: ON AVERAGE, HOW MANY DAYS PER WEEK DO YOU ENGAGE IN MODERATE TO STRENUOUS EXERCISE (LIKE A BRISK WALK)?: 5 DAYS

## 2025-02-24 SDOH — HEALTH STABILITY: PHYSICAL HEALTH: ON AVERAGE, HOW MANY MINUTES DO YOU ENGAGE IN EXERCISE AT THIS LEVEL?: 70 MIN

## 2025-02-24 ASSESSMENT — LIFESTYLE VARIABLES
HOW OFTEN DO YOU HAVE A DRINK CONTAINING ALCOHOL: 1
HOW MANY STANDARD DRINKS CONTAINING ALCOHOL DO YOU HAVE ON A TYPICAL DAY: PATIENT DOES NOT DRINK
HOW MANY STANDARD DRINKS CONTAINING ALCOHOL DO YOU HAVE ON A TYPICAL DAY: 0
HOW OFTEN DO YOU HAVE A DRINK CONTAINING ALCOHOL: NEVER
HOW OFTEN DO YOU HAVE SIX OR MORE DRINKS ON ONE OCCASION: 1

## 2025-02-24 ASSESSMENT — PATIENT HEALTH QUESTIONNAIRE - PHQ9
1. LITTLE INTEREST OR PLEASURE IN DOING THINGS: NOT AT ALL
SUM OF ALL RESPONSES TO PHQ QUESTIONS 1-9: 1
SUM OF ALL RESPONSES TO PHQ QUESTIONS 1-9: 1
2. FEELING DOWN, DEPRESSED OR HOPELESS: SEVERAL DAYS
SUM OF ALL RESPONSES TO PHQ QUESTIONS 1-9: 1
SUM OF ALL RESPONSES TO PHQ QUESTIONS 1-9: 1
SUM OF ALL RESPONSES TO PHQ9 QUESTIONS 1 & 2: 1

## 2025-02-25 ENCOUNTER — OFFICE VISIT (OUTPATIENT)
Dept: FAMILY MEDICINE CLINIC | Facility: CLINIC | Age: 85
End: 2025-02-25

## 2025-02-25 VITALS
WEIGHT: 176.6 LBS | BODY MASS INDEX: 26.76 KG/M2 | DIASTOLIC BLOOD PRESSURE: 64 MMHG | OXYGEN SATURATION: 93 % | HEIGHT: 68 IN | SYSTOLIC BLOOD PRESSURE: 132 MMHG

## 2025-02-25 DIAGNOSIS — M54.2 NECK PAIN: ICD-10-CM

## 2025-02-25 DIAGNOSIS — I25.10 CORONARY ARTERY DISEASE INVOLVING NATIVE CORONARY ARTERY OF NATIVE HEART WITHOUT ANGINA PECTORIS: ICD-10-CM

## 2025-02-25 DIAGNOSIS — I10 PRIMARY HYPERTENSION: ICD-10-CM

## 2025-02-25 DIAGNOSIS — F33.9 RECURRENT DEPRESSION: ICD-10-CM

## 2025-02-25 DIAGNOSIS — G25.81 RESTLESS LEG SYNDROME: ICD-10-CM

## 2025-02-25 DIAGNOSIS — Z00.00 MEDICARE ANNUAL WELLNESS VISIT, SUBSEQUENT: Primary | ICD-10-CM

## 2025-02-25 DIAGNOSIS — K58.0 IRRITABLE BOWEL SYNDROME WITH DIARRHEA: ICD-10-CM

## 2025-02-25 DIAGNOSIS — K42.9 UMBILICAL HERNIA WITHOUT OBSTRUCTION AND WITHOUT GANGRENE: ICD-10-CM

## 2025-02-25 DIAGNOSIS — S80.11XA HEMATOMA OF RIGHT LOWER EXTREMITY, INITIAL ENCOUNTER: ICD-10-CM

## 2025-02-25 DIAGNOSIS — E78.00 PURE HYPERCHOLESTEROLEMIA: ICD-10-CM

## 2025-02-25 LAB
ALBUMIN SERPL-MCNC: 3.7 G/DL (ref 3.2–4.6)
ALBUMIN/GLOB SERPL: 1.2 (ref 1–1.9)
ALP SERPL-CCNC: 112 U/L (ref 40–129)
ALT SERPL-CCNC: 24 U/L (ref 8–55)
ANION GAP SERPL CALC-SCNC: 9 MMOL/L (ref 7–16)
AST SERPL-CCNC: 33 U/L (ref 15–37)
BASOPHILS # BLD: 0.07 K/UL (ref 0–0.2)
BASOPHILS NFR BLD: 1 % (ref 0–2)
BILIRUB SERPL-MCNC: 1.7 MG/DL (ref 0–1.2)
BUN SERPL-MCNC: 18 MG/DL (ref 8–23)
CALCIUM SERPL-MCNC: 9.6 MG/DL (ref 8.8–10.2)
CHLORIDE SERPL-SCNC: 98 MMOL/L (ref 98–107)
CHOLEST SERPL-MCNC: 114 MG/DL (ref 0–200)
CO2 SERPL-SCNC: 34 MMOL/L (ref 20–29)
CREAT SERPL-MCNC: 0.9 MG/DL (ref 0.8–1.3)
DIFFERENTIAL METHOD BLD: NORMAL
EOSINOPHIL # BLD: 0.14 K/UL (ref 0–0.8)
EOSINOPHIL NFR BLD: 2 % (ref 0.5–7.8)
ERYTHROCYTE [DISTWIDTH] IN BLOOD BY AUTOMATED COUNT: 12.9 % (ref 11.9–14.6)
GLOBULIN SER CALC-MCNC: 3 G/DL (ref 2.3–3.5)
GLUCOSE SERPL-MCNC: 107 MG/DL (ref 70–99)
HCT VFR BLD AUTO: 42.2 % (ref 41.1–50.3)
HDLC SERPL-MCNC: 52 MG/DL (ref 40–60)
HDLC SERPL: 2.2 (ref 0–5)
HGB BLD-MCNC: 14.1 G/DL (ref 13.6–17.2)
IMM GRANULOCYTES # BLD AUTO: 0.01 K/UL (ref 0–0.5)
IMM GRANULOCYTES NFR BLD AUTO: 0.1 % (ref 0–5)
LDLC SERPL CALC-MCNC: 42 MG/DL (ref 0–100)
LYMPHOCYTES # BLD: 1.7 K/UL (ref 0.5–4.6)
LYMPHOCYTES NFR BLD: 24.1 % (ref 13–44)
MCH RBC QN AUTO: 30.3 PG (ref 26.1–32.9)
MCHC RBC AUTO-ENTMCNC: 33.4 G/DL (ref 31.4–35)
MCV RBC AUTO: 90.8 FL (ref 82–102)
MONOCYTES # BLD: 0.52 K/UL (ref 0.1–1.3)
MONOCYTES NFR BLD: 7.4 % (ref 4–12)
NEUTS SEG # BLD: 4.62 K/UL (ref 1.7–8.2)
NEUTS SEG NFR BLD: 65.4 % (ref 43–78)
NRBC # BLD: 0 K/UL (ref 0–0.2)
PLATELET # BLD AUTO: 224 K/UL (ref 150–450)
PMV BLD AUTO: 10.6 FL (ref 9.4–12.3)
POTASSIUM SERPL-SCNC: 3.6 MMOL/L (ref 3.5–5.1)
PROT SERPL-MCNC: 6.8 G/DL (ref 6.3–8.2)
RBC # BLD AUTO: 4.65 M/UL (ref 4.23–5.6)
SODIUM SERPL-SCNC: 140 MMOL/L (ref 136–145)
TRIGL SERPL-MCNC: 103 MG/DL (ref 0–150)
TSH, 3RD GENERATION: 2.03 UIU/ML (ref 0.27–4.2)
VLDLC SERPL CALC-MCNC: 21 MG/DL (ref 6–23)
WBC # BLD AUTO: 7.1 K/UL (ref 4.3–11.1)

## 2025-02-25 RX ORDER — ANTIOX #8/OM3/DHA/EPA/LUT/ZEAX 250-2.5 MG
CAPSULE ORAL 2 TIMES DAILY
COMMUNITY

## 2025-02-25 NOTE — PROGRESS NOTES
Medicare Annual Wellness Visit    Aren Hinojosa is here for Medicare AWV (Subsequent)    Assessment & Plan   Medicare annual wellness visit, subsequent     No follow-ups on file.     Subjective   who has a past medical history significant for hypertension, CAD, recurrent pericarditis, history of GI bleed, peptic ulcer disease, BPH, recurrent depression, reflux and high cholesterol.     Patient's complete Health Risk Assessment and screening values have been reviewed and are found in Flowsheets. The following problems were reviewed today and where indicated follow up appointments were made and/or referrals ordered.    Positive Risk Factor Screenings with Interventions:    Fall Risk:  Do you feel unsteady or are you worried about falling? : (!) yes (periodic tingling in his feet, Pt has not had a fall but stumbles often.)  2 or more falls in past year?: no  Fall with injury in past year?: no     Interventions:    Reviewed medications, home hazards, visual acuity, and co-morbidities that can increase risk for falls    Cognitive:      Words recalled: 2 Words Recalled     Total Score Interpretation: Abnormal Mini-Cog  Interventions:  Patient declines any further evaluation or treatment           General HRA Questions:  Select all that apply: (!) Stress  Interventions - Stress:  Discussed with the patient treatment options.  He elects for biofeedback.                            Objective   Vitals:    02/25/25 1019   BP: 132/64   Site: Left Upper Arm   Position: Sitting   SpO2: 93%   Weight: 80.1 kg (176 lb 9.6 oz)   Height: 1.727 m (5' 8\")      Body mass index is 26.85 kg/m².        General Appearance: alert and oriented to person, place and time, well developed and well- nourished, in no acute distress  Skin: warm and dry, no rash or erythema  Head: normocephalic and atraumatic  Eyes: pupils equal, round, and reactive to light, extraocular eye movements intact, conjunctivae normal  ENT: tympanic membrane, external 
SUBJECTIVE:   Aren Hinojosa is a 84 y.o. male who has a past medical history significant for hypertension, CAD, recurrent pericarditis, history of GI bleed, peptic ulcer disease, BPH, recurrent depression, reflux and high cholesterol.  Review of systems reveals that he has been feeling much better from the perspective of his neck pain since going to physical therapy ordered by his orthopedist.  Please refer to prior notes for details.  He is now having some issues with his low back and is addressing these accordingly.    In addition the patient reports he has been diagnosed with an umbilical hernia.  He has been evaluated by general surgery and has been determined to not be a problem at this time and for him to monitor as opposed to repairing surgically.    In addition he reports that he bruised his right upper inner thigh about a month ago.  He cannot remember exactly how he bruised it but there was a large ecchymosis that has since cleared.  However there is been a small quarter size \"bump\" that has remained in the area where he had the bruise.  It is not tender.  It seems to be getting smaller.    Patient also recognizes ongoing stress situationally related to his wife's chronic medical illness and condition.  He states that this is at times creating some worsening depression but for the most part he is doing well.    Review of systems reveals no complaints of chest pain, shortness of breath, orthopnea or PND.  GI and  review of systems is unremarkable.  Current medications are listed in the EMR and reviewed today.      HPI  See above    Past Medical History, Past Surgical History, Family history, Social History, and Medications were all reviewed with the patient today and updated as necessary.       Current Outpatient Medications   Medication Sig Dispense Refill    Multiple Vitamins-Minerals (PRESERVISION AREDS 2) CAPS Take by mouth in the morning and at bedtime      atorvastatin (LIPITOR) 40 MG tablet 
\"Have you been to the ER, urgent care clinic since your last visit?  Hospitalized since your last visit?\"    NO    “Have you seen or consulted any other health care providers outside our system since your last visit?”    NO          
No

## 2025-03-25 ENCOUNTER — CLINICAL SUPPORT (OUTPATIENT)
Dept: PULMONOLOGY | Age: 85
End: 2025-03-25
Payer: MEDICARE

## 2025-03-25 DIAGNOSIS — R06.02 SHORTNESS OF BREATH: ICD-10-CM

## 2025-03-25 DIAGNOSIS — R94.2 DECREASED DIFFUSION CAPACITY: Primary | ICD-10-CM

## 2025-03-25 LAB
FEF25-27, POC: 2.04 L/S
FET, POC: NORMAL
FEV 1 , POC: 2.35 L
FEV1/FVC, POC: NORMAL
FVC, POC: NORMAL
LUNG AGE, POC: NORMAL
PEF, POC: 6.41 L/S

## 2025-03-25 PROCEDURE — 94726 PLETHYSMOGRAPHY LUNG VOLUMES: CPT | Performed by: INTERNAL MEDICINE

## 2025-03-25 PROCEDURE — 94729 DIFFUSING CAPACITY: CPT | Performed by: INTERNAL MEDICINE

## 2025-03-25 PROCEDURE — 94010 BREATHING CAPACITY TEST: CPT | Performed by: INTERNAL MEDICINE

## 2025-04-04 RX ORDER — ATORVASTATIN CALCIUM 40 MG/1
40 TABLET, FILM COATED ORAL DAILY
Qty: 90 TABLET | Refills: 0 | Status: SHIPPED | OUTPATIENT
Start: 2025-04-04

## 2025-04-04 NOTE — TELEPHONE ENCOUNTER
Refill requested     Preferred pharm: [id number] - [place] [address]  - P [phone] - F [phone]

## 2025-04-08 ENCOUNTER — TELEMEDICINE (OUTPATIENT)
Dept: FAMILY MEDICINE CLINIC | Facility: CLINIC | Age: 85
End: 2025-04-08
Payer: MEDICARE

## 2025-04-08 DIAGNOSIS — F33.9 RECURRENT DEPRESSION: ICD-10-CM

## 2025-04-08 DIAGNOSIS — E80.6 HYPERBILIRUBINEMIA: ICD-10-CM

## 2025-04-08 DIAGNOSIS — I25.10 CORONARY ARTERY DISEASE INVOLVING NATIVE CORONARY ARTERY OF NATIVE HEART WITHOUT ANGINA PECTORIS: ICD-10-CM

## 2025-04-08 DIAGNOSIS — E78.00 PURE HYPERCHOLESTEROLEMIA: Primary | ICD-10-CM

## 2025-04-08 DIAGNOSIS — G25.81 RESTLESS LEG SYNDROME: ICD-10-CM

## 2025-04-08 DIAGNOSIS — I10 PRIMARY HYPERTENSION: ICD-10-CM

## 2025-04-08 PROCEDURE — G8417 CALC BMI ABV UP PARAM F/U: HCPCS | Performed by: FAMILY MEDICINE

## 2025-04-08 PROCEDURE — 1159F MED LIST DOCD IN RCRD: CPT | Performed by: FAMILY MEDICINE

## 2025-04-08 PROCEDURE — 1036F TOBACCO NON-USER: CPT | Performed by: FAMILY MEDICINE

## 2025-04-08 PROCEDURE — 1123F ACP DISCUSS/DSCN MKR DOCD: CPT | Performed by: FAMILY MEDICINE

## 2025-04-08 PROCEDURE — G8427 DOCREV CUR MEDS BY ELIG CLIN: HCPCS | Performed by: FAMILY MEDICINE

## 2025-04-08 PROCEDURE — 99213 OFFICE O/P EST LOW 20 MIN: CPT | Performed by: FAMILY MEDICINE

## 2025-04-08 RX ORDER — PRAMIPEXOLE DIHYDROCHLORIDE 0.5 MG/1
0.5 TABLET ORAL NIGHTLY
Qty: 90 TABLET | Refills: 3 | Status: SHIPPED | OUTPATIENT
Start: 2025-04-08

## 2025-04-08 RX ORDER — ATORVASTATIN CALCIUM 40 MG/1
40 TABLET, FILM COATED ORAL DAILY
Qty: 90 TABLET | Refills: 0 | Status: SHIPPED | OUTPATIENT
Start: 2025-04-08

## 2025-04-08 RX ORDER — PANTOPRAZOLE SODIUM 40 MG/1
TABLET, DELAYED RELEASE ORAL
Qty: 90 TABLET | Refills: 0 | Status: SHIPPED | OUTPATIENT
Start: 2025-04-08

## 2025-04-08 RX ORDER — METOPROLOL SUCCINATE 100 MG/1
100 TABLET, EXTENDED RELEASE ORAL DAILY
Qty: 90 TABLET | Refills: 3 | Status: SHIPPED | OUTPATIENT
Start: 2025-04-08

## 2025-04-08 NOTE — PROGRESS NOTES
SUBJECTIVE:   Aren Hinojosa is a 84 y.o. male who has a past medical history significant for hypertension, CAD, recurrent pericarditis, history of GI bleed, peptic ulcer disease, BPH, recurrent depression, reflux and high cholesterol.  Patient presents today for a virtual visit via telephone encounter.  Video [employer] messaging deferred secondary to technical challenges.    Patient reports that since I last seen him he underwent a heart cath and was found to have an 80% blockage.  He states he had a stent placed.  He was having some mild anginal symptoms but these have resolved since the stents been placed.    Patient reports no active chest pain, shortness of breath, orthopnea or PND.  Current medications are listed in the EMR and reviewed today.    Patient's vital signs were not performed as encounter was performed virtually.  Location of virtual visit was patient's home.      HPI  See above    Past Medical History, Past Surgical History, Family history, Social History, and Medications were all reviewed with the patient today and updated as necessary.       Current Outpatient Medications   Medication Sig Dispense Refill    atorvastatin (LIPITOR) 40 MG tablet TAKE 1 TABLET BY MOUTH EVERY DAY 90 tablet 0    Multiple Vitamins-Minerals (PRESERVISION AREDS 2) CAPS Take by mouth in the morning and at bedtime      isosorbide mononitrate (IMDUR) 30 MG extended release tablet TAKE 1 TABLET DAILY FOR CHRONIC STABLE ANGINA PECTORIS 90 tablet 3    clopidogrel (PLAVIX) 75 MG tablet Take 1 tablet by mouth daily 90 tablet 3    furosemide (LASIX) 40 MG tablet Take 1 tablet by mouth daily 90 tablet 3    chlorthalidone (HYGROTON) 25 MG tablet Take 0.5 tablets by mouth every other day (Patient taking differently: Take 0.5 tablets by mouth every other day Pt reports taking 0.5 tabs every other day)      pantoprazole (PROTONIX) 40 MG tablet TAKE 1 TABLET BY MOUTH EVERY DAY 90 tablet 0    metoprolol succinate (TOPROL XL) 100 MG

## 2025-04-16 ENCOUNTER — OFFICE VISIT (OUTPATIENT)
Dept: PULMONOLOGY | Age: 85
End: 2025-04-16
Payer: MEDICARE

## 2025-04-16 VITALS
DIASTOLIC BLOOD PRESSURE: 66 MMHG | BODY MASS INDEX: 27.13 KG/M2 | HEART RATE: 59 BPM | WEIGHT: 179 LBS | SYSTOLIC BLOOD PRESSURE: 156 MMHG | RESPIRATION RATE: 16 BRPM | TEMPERATURE: 97.2 F | HEIGHT: 68 IN | OXYGEN SATURATION: 94 %

## 2025-04-16 DIAGNOSIS — J98.11 ATELECTASIS: ICD-10-CM

## 2025-04-16 DIAGNOSIS — R06.02 SHORTNESS OF BREATH: ICD-10-CM

## 2025-04-16 DIAGNOSIS — J98.6 PARALYZED HEMIDIAPHRAGM: ICD-10-CM

## 2025-04-16 DIAGNOSIS — R94.2 DECREASED DIFFUSION CAPACITY: Primary | ICD-10-CM

## 2025-04-16 PROCEDURE — 1125F AMNT PAIN NOTED PAIN PRSNT: CPT | Performed by: INTERNAL MEDICINE

## 2025-04-16 PROCEDURE — 3077F SYST BP >= 140 MM HG: CPT | Performed by: INTERNAL MEDICINE

## 2025-04-16 PROCEDURE — 3078F DIAST BP <80 MM HG: CPT | Performed by: INTERNAL MEDICINE

## 2025-04-16 PROCEDURE — 1123F ACP DISCUSS/DSCN MKR DOCD: CPT | Performed by: INTERNAL MEDICINE

## 2025-04-16 PROCEDURE — 1159F MED LIST DOCD IN RCRD: CPT | Performed by: INTERNAL MEDICINE

## 2025-04-16 PROCEDURE — G8427 DOCREV CUR MEDS BY ELIG CLIN: HCPCS | Performed by: INTERNAL MEDICINE

## 2025-04-16 PROCEDURE — G8417 CALC BMI ABV UP PARAM F/U: HCPCS | Performed by: INTERNAL MEDICINE

## 2025-04-16 PROCEDURE — 1036F TOBACCO NON-USER: CPT | Performed by: INTERNAL MEDICINE

## 2025-04-16 PROCEDURE — 99214 OFFICE O/P EST MOD 30 MIN: CPT | Performed by: INTERNAL MEDICINE

## 2025-04-16 ASSESSMENT — PATIENT HEALTH QUESTIONNAIRE - PHQ9
SUM OF ALL RESPONSES TO PHQ QUESTIONS 1-9: 0
1. LITTLE INTEREST OR PLEASURE IN DOING THINGS: NOT AT ALL
SUM OF ALL RESPONSES TO PHQ QUESTIONS 1-9: 0
2. FEELING DOWN, DEPRESSED OR HOPELESS: NOT AT ALL

## 2025-04-16 NOTE — PROGRESS NOTES
Name:  Aren Hinojosa  YOB: 1940   MRN: [id number]      Office Visit: 4/16/2025       Assessment & Plan (Medical Decision Making)    Impression: 84 y.o. male with dyspnea  Last visit 03/25/24  PLAN:       Encounter Diagnoses   Name Primary?    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  Physical exam is clear with no evidence of crackles, his physical capacity is preserved.  Given his age of 83 years old I counseled the patient on pacing himself while exerting himself at home and around his house and to keep maintaining physical activity is much as possible as he is currently doing.  I do not think any further workup is warranted except for repeating a complete pulmonary function on the same device in our office for a warranted comparison.  The patient will return to the office for follow-up with us in a year but call us should he experience worsening symptoms prior to that time.     Any questions asked were answered seemingly to his satisfaction.    1. Decreased diffusion capacity  The patient continues to have decreased diffusion capacity which I believe is related to his right lower lobe atelectasis directly related to diaphragmatic paralysis.  He has done well over the past year, he is completely unlimited in his exertional capacity in fact he looks younger than his stated age he is very active and goes to the gym 5 times a week and has completely no symptoms of limitation when performing his exercises etc.  There is no complaints of shortness of breath, coughing, weight loss in fact

## 2025-06-03 ENCOUNTER — OFFICE VISIT (OUTPATIENT)
Dept: FAMILY MEDICINE CLINIC | Facility: CLINIC | Age: 85
End: 2025-06-03
Payer: MEDICARE

## 2025-06-03 VITALS
BODY MASS INDEX: 27.83 KG/M2 | OXYGEN SATURATION: 96 % | DIASTOLIC BLOOD PRESSURE: 58 MMHG | SYSTOLIC BLOOD PRESSURE: 142 MMHG | WEIGHT: 183 LBS | TEMPERATURE: 99.1 F | HEART RATE: 67 BPM

## 2025-06-03 DIAGNOSIS — I10 PRIMARY HYPERTENSION: ICD-10-CM

## 2025-06-03 DIAGNOSIS — N41.1 SUBACUTE PROSTATITIS: ICD-10-CM

## 2025-06-03 DIAGNOSIS — R50.9 FEVER, UNSPECIFIED: ICD-10-CM

## 2025-06-03 DIAGNOSIS — E78.00 PURE HYPERCHOLESTEROLEMIA: ICD-10-CM

## 2025-06-03 DIAGNOSIS — F33.9 RECURRENT DEPRESSION: ICD-10-CM

## 2025-06-03 DIAGNOSIS — I25.10 CORONARY ARTERY DISEASE INVOLVING NATIVE CORONARY ARTERY OF NATIVE HEART WITHOUT ANGINA PECTORIS: ICD-10-CM

## 2025-06-03 DIAGNOSIS — R52 BODY ACHES: Primary | ICD-10-CM

## 2025-06-03 LAB
EXP DATE SOLUTION: NORMAL
EXP DATE SWAB: NORMAL
EXPIRATION DATE: NORMAL
INFLUENZA A ANTIGEN, POC: NEGATIVE
INFLUENZA B ANTIGEN, POC: NEGATIVE
LOT NUMBER POC: NORMAL
LOT NUMBER SOLUTION: NORMAL
LOT NUMBER SWAB: NORMAL
SARS-COV-2 RNA, POC: NEGATIVE
VALID INTERNAL CONTROL, POC: YES

## 2025-06-03 PROCEDURE — 3078F DIAST BP <80 MM HG: CPT | Performed by: FAMILY MEDICINE

## 2025-06-03 PROCEDURE — 87635 SARS-COV-2 COVID-19 AMP PRB: CPT | Performed by: FAMILY MEDICINE

## 2025-06-03 PROCEDURE — 1123F ACP DISCUSS/DSCN MKR DOCD: CPT | Performed by: FAMILY MEDICINE

## 2025-06-03 PROCEDURE — G2211 COMPLEX E/M VISIT ADD ON: HCPCS | Performed by: FAMILY MEDICINE

## 2025-06-03 PROCEDURE — G8427 DOCREV CUR MEDS BY ELIG CLIN: HCPCS | Performed by: FAMILY MEDICINE

## 2025-06-03 PROCEDURE — 3077F SYST BP >= 140 MM HG: CPT | Performed by: FAMILY MEDICINE

## 2025-06-03 PROCEDURE — 87804 INFLUENZA ASSAY W/OPTIC: CPT | Performed by: FAMILY MEDICINE

## 2025-06-03 PROCEDURE — G8417 CALC BMI ABV UP PARAM F/U: HCPCS | Performed by: FAMILY MEDICINE

## 2025-06-03 PROCEDURE — 1036F TOBACCO NON-USER: CPT | Performed by: FAMILY MEDICINE

## 2025-06-03 PROCEDURE — 99213 OFFICE O/P EST LOW 20 MIN: CPT | Performed by: FAMILY MEDICINE

## 2025-06-03 PROCEDURE — 1159F MED LIST DOCD IN RCRD: CPT | Performed by: FAMILY MEDICINE

## 2025-06-03 RX ORDER — SULFAMETHOXAZOLE AND TRIMETHOPRIM 800; 160 MG/1; MG/1
1 TABLET ORAL 2 TIMES DAILY
Qty: 20 TABLET | Refills: 0 | Status: SHIPPED | OUTPATIENT
Start: 2025-06-03 | End: 2025-06-13

## 2025-06-03 NOTE — PROGRESS NOTES
SUBJECTIVE:   Aren Hinojosa is a 84 y.o. 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  Patient presents today reporting that he has been experiencing for 3 days some body aches and low-grade fever.  He reports some drainage postnasally that is clear.  No headache or head congestion.  No cough or chest congestion.  He reports no abdominal pain, diarrhea, melena, hematochezia or constipation.  He does report some mild low back pain but denies any urinary frequency, nocturia or urgency.  No rash reported.  No neck pain.    HPI  See above    Past Medical History, Past Surgical History, Family history, Social History, and Medications were all reviewed with the patient today and updated as necessary.       Current Outpatient Medications   Medication Sig Dispense Refill    Misc. Devices (CPAP MACHINE) MISC by Does not apply route      atorvastatin (LIPITOR) 40 MG tablet Take 1 tablet by mouth daily 90 tablet 0    metoprolol succinate (TOPROL XL) 100 MG extended release tablet Take 1 tablet by mouth daily 90 tablet 3    pantoprazole (PROTONIX) 40 MG tablet TAKE 1 TABLET BY MOUTH EVERY DAY 90 tablet 0    pramipexole (MIRAPEX) 0.5 MG tablet Take 1 tablet by mouth nightly 90 tablet 3    Multiple Vitamins-Minerals (PRESERVISION AREDS 2) CAPS Take by mouth in the morning and at bedtime      isosorbide mononitrate (IMDUR) 30 MG extended release tablet TAKE 1 TABLET DAILY FOR CHRONIC STABLE ANGINA PECTORIS 90 tablet 3    clopidogrel (PLAVIX) 75 MG tablet Take 1 tablet by mouth daily 90 tablet 3    furosemide (LASIX) 40 MG tablet Take 1 tablet by mouth daily 90 tablet 3    chlorthalidone (HYGROTON) 25 MG tablet Take 0.5 tablets by mouth every other day      Polyethylene Glycol 3350

## 2025-06-17 ENCOUNTER — OFFICE VISIT (OUTPATIENT)
Dept: FAMILY MEDICINE CLINIC | Facility: CLINIC | Age: 85
End: 2025-06-17
Payer: MEDICARE

## 2025-06-17 VITALS
WEIGHT: 181 LBS | BODY MASS INDEX: 27.43 KG/M2 | DIASTOLIC BLOOD PRESSURE: 60 MMHG | OXYGEN SATURATION: 96 % | SYSTOLIC BLOOD PRESSURE: 122 MMHG | HEART RATE: 59 BPM | HEIGHT: 68 IN

## 2025-06-17 DIAGNOSIS — Z86.79 HISTORY OF PERICARDITIS: ICD-10-CM

## 2025-06-17 DIAGNOSIS — I25.10 CORONARY ARTERY DISEASE INVOLVING NATIVE CORONARY ARTERY OF NATIVE HEART WITHOUT ANGINA PECTORIS: ICD-10-CM

## 2025-06-17 DIAGNOSIS — I10 PRIMARY HYPERTENSION: ICD-10-CM

## 2025-06-17 DIAGNOSIS — Z87.19 HISTORY OF GI BLEED: ICD-10-CM

## 2025-06-17 DIAGNOSIS — R53.83 FATIGUE, UNSPECIFIED TYPE: ICD-10-CM

## 2025-06-17 DIAGNOSIS — F33.9 RECURRENT DEPRESSION: ICD-10-CM

## 2025-06-17 DIAGNOSIS — R07.89 ATYPICAL CHEST PAIN: Primary | ICD-10-CM

## 2025-06-17 DIAGNOSIS — E78.00 PURE HYPERCHOLESTEROLEMIA: ICD-10-CM

## 2025-06-17 DIAGNOSIS — K21.9 GERD WITHOUT ESOPHAGITIS: ICD-10-CM

## 2025-06-17 LAB
ALBUMIN SERPL-MCNC: 3.5 G/DL (ref 3.2–4.6)
ALBUMIN/GLOB SERPL: 1 (ref 1–1.9)
ALP SERPL-CCNC: 122 U/L (ref 40–129)
ALT SERPL-CCNC: 30 U/L (ref 8–55)
ANION GAP SERPL CALC-SCNC: 11 MMOL/L (ref 7–16)
AST SERPL-CCNC: 36 U/L (ref 15–37)
BASOPHILS # BLD: 0.07 K/UL (ref 0–0.2)
BASOPHILS NFR BLD: 1 % (ref 0–2)
BILIRUB SERPL-MCNC: 1.2 MG/DL (ref 0–1.2)
BUN SERPL-MCNC: 19 MG/DL (ref 8–23)
CALCIUM SERPL-MCNC: 9.5 MG/DL (ref 8.8–10.2)
CHLORIDE SERPL-SCNC: 98 MMOL/L (ref 98–107)
CO2 SERPL-SCNC: 30 MMOL/L (ref 20–29)
CREAT SERPL-MCNC: 0.96 MG/DL (ref 0.8–1.3)
CRP SERPL-MCNC: 1.4 MG/DL (ref 0–0.4)
DIFFERENTIAL METHOD BLD: ABNORMAL
EOSINOPHIL # BLD: 0.11 K/UL (ref 0–0.8)
EOSINOPHIL NFR BLD: 1.5 % (ref 0.5–7.8)
ERYTHROCYTE [DISTWIDTH] IN BLOOD BY AUTOMATED COUNT: 13 % (ref 11.9–14.6)
ERYTHROCYTE [SEDIMENTATION RATE] IN BLOOD: 12 MM/HR
GLOBULIN SER CALC-MCNC: 3.3 G/DL (ref 2.3–3.5)
GLUCOSE SERPL-MCNC: 97 MG/DL (ref 70–99)
HCT VFR BLD AUTO: 40.2 % (ref 41.1–50.3)
HGB BLD-MCNC: 12.9 G/DL (ref 13.6–17.2)
IMM GRANULOCYTES # BLD AUTO: 0.01 K/UL (ref 0–0.5)
IMM GRANULOCYTES NFR BLD AUTO: 0.1 % (ref 0–5)
LYMPHOCYTES # BLD: 2.95 K/UL (ref 0.5–4.6)
LYMPHOCYTES NFR BLD: 41.2 % (ref 13–44)
MCH RBC QN AUTO: 29.8 PG (ref 26.1–32.9)
MCHC RBC AUTO-ENTMCNC: 32.1 G/DL (ref 31.4–35)
MCV RBC AUTO: 92.8 FL (ref 82–102)
MONOCYTES # BLD: 0.48 K/UL (ref 0.1–1.3)
MONOCYTES NFR BLD: 6.7 % (ref 4–12)
NEUTS SEG # BLD: 3.54 K/UL (ref 1.7–8.2)
NEUTS SEG NFR BLD: 49.5 % (ref 43–78)
NRBC # BLD: 0 K/UL (ref 0–0.2)
PLATELET # BLD AUTO: 305 K/UL (ref 150–450)
PMV BLD AUTO: 9.7 FL (ref 9.4–12.3)
POTASSIUM SERPL-SCNC: 4 MMOL/L (ref 3.5–5.1)
PROT SERPL-MCNC: 6.8 G/DL (ref 6.3–8.2)
RBC # BLD AUTO: 4.33 M/UL (ref 4.23–5.6)
SODIUM SERPL-SCNC: 139 MMOL/L (ref 136–145)
TSH, 3RD GENERATION: 2.4 UIU/ML (ref 0.27–4.2)
WBC # BLD AUTO: 7.2 K/UL (ref 4.3–11.1)

## 2025-06-17 PROCEDURE — 1036F TOBACCO NON-USER: CPT | Performed by: FAMILY MEDICINE

## 2025-06-17 PROCEDURE — 1159F MED LIST DOCD IN RCRD: CPT | Performed by: FAMILY MEDICINE

## 2025-06-17 PROCEDURE — 93000 ELECTROCARDIOGRAM COMPLETE: CPT | Performed by: FAMILY MEDICINE

## 2025-06-17 PROCEDURE — 1123F ACP DISCUSS/DSCN MKR DOCD: CPT | Performed by: FAMILY MEDICINE

## 2025-06-17 PROCEDURE — 3074F SYST BP LT 130 MM HG: CPT | Performed by: FAMILY MEDICINE

## 2025-06-17 PROCEDURE — 3078F DIAST BP <80 MM HG: CPT | Performed by: FAMILY MEDICINE

## 2025-06-17 PROCEDURE — G8417 CALC BMI ABV UP PARAM F/U: HCPCS | Performed by: FAMILY MEDICINE

## 2025-06-17 PROCEDURE — 99215 OFFICE O/P EST HI 40 MIN: CPT | Performed by: FAMILY MEDICINE

## 2025-06-17 PROCEDURE — G8427 DOCREV CUR MEDS BY ELIG CLIN: HCPCS | Performed by: FAMILY MEDICINE

## 2025-06-17 RX ORDER — COLCHICINE 0.6 MG/1
0.6 TABLET ORAL DAILY
Qty: 30 TABLET | Refills: 0 | Status: SHIPPED | OUTPATIENT
Start: 2025-06-17

## 2025-06-17 RX ORDER — INDOMETHACIN 50 MG/1
50 CAPSULE ORAL 3 TIMES DAILY
Qty: 60 CAPSULE | Refills: 0 | Status: SHIPPED | OUTPATIENT
Start: 2025-06-17

## 2025-06-17 NOTE — PROGRESS NOTES
SUBJECTIVE:   Aren Hinojosa is a 84 y.o. 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     Patient presents today reporting that he has felt fatigued with low energy and intermittent substernal chest pressure.  He reports some mild shortness of breath.  He states that his symptoms are worse when he lies down flat.  He reports no peripheral edema.  He reports no fever or bodyaches.  Patient when questioned acknowledges that some of his symptoms feel consistent with the way he felt when he had his chronic pericarditis.  Patient reports no rash or joint pain.  His current medicines are listed in the EMR and reviewed today.    HPI  See above    Past Medical History, Past Surgical History, Family history, Social History, and Medications were all reviewed with the patient today and updated as necessary.       Current Outpatient Medications   Medication Sig Dispense Refill    Misc. Devices (CPAP MACHINE) MISC by Does not apply route      atorvastatin (LIPITOR) 40 MG tablet Take 1 tablet by mouth daily 90 tablet 0    metoprolol succinate (TOPROL XL) 100 MG extended release tablet Take 1 tablet by mouth daily 90 tablet 3    pantoprazole (PROTONIX) 40 MG tablet TAKE 1 TABLET BY MOUTH EVERY DAY 90 tablet 0    pramipexole (MIRAPEX) 0.5 MG tablet Take 1 tablet by mouth nightly 90 tablet 3    Multiple Vitamins-Minerals (PRESERVISION AREDS 2) CAPS Take by mouth in the morning and at bedtime      isosorbide mononitrate (IMDUR) 30 MG extended release tablet TAKE 1 TABLET DAILY FOR CHRONIC STABLE ANGINA PECTORIS 90 tablet 3    clopidogrel (PLAVIX) 75 MG tablet Take 1 tablet by mouth daily 90 tablet 3    furosemide (LASIX) 40 MG tablet Take 1 tablet by mouth daily 90 tablet 3

## 2025-06-18 DIAGNOSIS — D64.9 ANEMIA, UNSPECIFIED TYPE: ICD-10-CM

## 2025-06-18 DIAGNOSIS — Z87.19 HISTORY OF GI BLEED: Primary | ICD-10-CM

## 2025-06-19 DIAGNOSIS — Z87.19 HISTORY OF GI BLEED: Primary | ICD-10-CM

## 2025-06-19 DIAGNOSIS — D64.9 ANEMIA, UNSPECIFIED TYPE: ICD-10-CM

## 2025-07-14 RX ORDER — COLCHICINE 0.6 MG/1
0.6 TABLET ORAL DAILY
Qty: 30 TABLET | Refills: 0 | Status: SHIPPED | OUTPATIENT
Start: 2025-07-14

## 2025-08-01 ENCOUNTER — CARE COORDINATION (OUTPATIENT)
Dept: CARE COORDINATION | Facility: CLINIC | Age: 85
End: 2025-08-01

## 2025-08-01 NOTE — CARE COORDINATION
Ambulatory Care Coordination Note     8/1/2025 10:37 AM     ACM outreach attempt by this ACM today to offer care management services. ACM was unable to reach the patient by telephone today;   left voice message requesting a return phone call to this ACM.     ACM: Daisy Franks RN     Care Summary Note:     PCP/Specialist follow up:   Future Appointments         Provider Specialty Dept Phone    10/14/2025 10:30 AM PST LAB [facility] [phone]    10/21/2025 10:30 AM David Ashraf MD [facility] [phone]            Follow Up:   Plan for next ACM outreach in approximately 1 week to complete:  - outreach attempt to offer care management services.

## 2025-08-08 ENCOUNTER — CARE COORDINATION (OUTPATIENT)
Dept: CARE COORDINATION | Facility: CLINIC | Age: 85
End: 2025-08-08

## 2025-08-15 ENCOUNTER — CARE COORDINATION (OUTPATIENT)
Dept: CARE COORDINATION | Facility: CLINIC | Age: 85
End: 2025-08-15

## (undated) DEVICE — AMD ANTIMICROBIAL GAUZE SPONGES,12 PLY USP TYPE VII, 0.2% POLYHEXAMETHYLENE BIGUANIDE HCI (PHMB): Brand: CURITY

## (undated) DEVICE — KENDALL RADIOLUCENT FOAM MONITORING ELECTRODE RECTANGULAR SHAPE: Brand: KENDALL

## (undated) DEVICE — STERILE POLYISOPRENE POWDER-FREE SURGICAL GLOVES: Brand: PROTEXIS

## (undated) DEVICE — FORCEPS BX L240CM JAW DIA2.8MM L CAP W/ NDL MIC MESH TOOTH

## (undated) DEVICE — SET IRRIG W 96IN TBNG 4 LN FLX BG

## (undated) DEVICE — CONNECTOR TBNG OD5-7MM O2 END DISP

## (undated) DEVICE — (D)STRIP SKN CLSR 0.5X4IN WHT --

## (undated) DEVICE — NDL SPNE QNCKE 18GX3.5IN LF --

## (undated) DEVICE — IMPERVIOUS STOCKINETTE: Brand: DEROYAL

## (undated) DEVICE — ACROMIOPLASTY ELECTRODE (ELECTRODE ONLY): Brand: CONMED

## (undated) DEVICE — CONTAINER PREFIL FRMLN 40ML --

## (undated) DEVICE — [RESECTOR CUTTER, ARTHROSCOPIC SHAVER BLADE,  DO NOT RESTERILIZE,  DO NOT USE IF PACKAGE IS DAMAGED,  KEEP DRY,  KEEP AWAY FROM SUNLIGHT]: Brand: FORMULA

## (undated) DEVICE — BLOCK BITE AD 60FR W/ VELC STRP ADDRESSES MOST PT AND

## (undated) DEVICE — BUTTON SWITCH PENCIL BLADE ELECTRODE, HOLSTER: Brand: EDGE

## (undated) DEVICE — CANNULA NSL ORAL AD FOR CAPNOFLEX CO2 O2 AIRLFE

## (undated) DEVICE — SUTURE MCRYL SZ 3-0 L27IN ABSRB UD L24MM PS-1 3/8 CIR PRIM Y936H

## (undated) DEVICE — KIT THORCENT 8FR L5IN POLYUR W/ 18/22/25GA NDL 3 W STPCOCK

## (undated) DEVICE — REM POLYHESIVE ADULT PATIENT RETURN ELECTRODE: Brand: VALLEYLAB

## (undated) DEVICE — GUARDIAN LVC: Brand: GUARDIAN

## (undated) DEVICE — MEDI-VAC NON-CONDUCTIVE SUCTION TUBING: Brand: CARDINAL HEALTH

## (undated) DEVICE — SLING ARM AD ULT

## (undated) DEVICE — DRAPE,SHOULDER,BEACH CHAIR,STERILE: Brand: MEDLINE

## (undated) DEVICE — SURGICAL PROCEDURE PACK BASIC ST FRANCIS

## (undated) DEVICE — 3M™ TEGADERM™ TRANSPARENT FILM DRESSING FRAME STYLE, 1626W, 4 IN X 4-3/4 IN (10 CM X 12 CM), 50/CT 4CT/CASE: Brand: 3M™ TEGADERM™

## (undated) DEVICE — GEL MEDC ULTRASOUND 5L -- REPLACED BY 326862

## (undated) DEVICE — SOLUTION IRRIG 3000ML 0.9% SOD CHL FLX CONT 0797208] ICU MEDICAL INC]

## (undated) DEVICE — (D)PREP SKN CHLRAPRP APPL 26ML -- CONVERT TO ITEM 371833